# Patient Record
Sex: MALE | Race: BLACK OR AFRICAN AMERICAN | Employment: UNEMPLOYED | URBAN - METROPOLITAN AREA
[De-identification: names, ages, dates, MRNs, and addresses within clinical notes are randomized per-mention and may not be internally consistent; named-entity substitution may affect disease eponyms.]

---

## 2018-12-13 ENCOUNTER — OFFICE VISIT (OUTPATIENT)
Dept: INTERNAL MEDICINE CLINIC | Facility: CLINIC | Age: 38
End: 2018-12-13
Payer: COMMERCIAL

## 2018-12-13 VITALS
BODY MASS INDEX: 33.69 KG/M2 | HEART RATE: 82 BPM | WEIGHT: 276.63 LBS | HEIGHT: 76 IN | RESPIRATION RATE: 16 BRPM | DIASTOLIC BLOOD PRESSURE: 98 MMHG | SYSTOLIC BLOOD PRESSURE: 172 MMHG | TEMPERATURE: 98 F

## 2018-12-13 DIAGNOSIS — I10 ESSENTIAL HYPERTENSION: Primary | ICD-10-CM

## 2018-12-13 DIAGNOSIS — Z00.00 LABORATORY EXAMINATION ORDERED AS PART OF A COMPLETE PHYSICAL EXAMINATION: ICD-10-CM

## 2018-12-13 DIAGNOSIS — G47.33 OSA (OBSTRUCTIVE SLEEP APNEA): ICD-10-CM

## 2018-12-13 DIAGNOSIS — J45.20 MILD INTERMITTENT ASTHMA WITHOUT COMPLICATION: ICD-10-CM

## 2018-12-13 PROCEDURE — 99204 OFFICE O/P NEW MOD 45 MIN: CPT | Performed by: NURSE PRACTITIONER

## 2018-12-13 RX ORDER — MINOXIDIL 2.5 MG/1
2.5 TABLET ORAL DAILY
Refills: 0 | COMMUNITY
Start: 2018-12-13 | End: 2018-12-13

## 2018-12-13 RX ORDER — TRIAMTERENE AND HYDROCHLOROTHIAZIDE 37.5; 25 MG/1; MG/1
1 CAPSULE ORAL EVERY MORNING
Refills: 0 | COMMUNITY
Start: 2018-12-13 | End: 2018-12-13

## 2018-12-13 RX ORDER — IRBESARTAN AND HYDROCHLOROTHIAZIDE 300; 12.5 MG/1; MG/1
1 TABLET, FILM COATED ORAL DAILY
Qty: 90 TABLET | Refills: 0 | Status: SHIPPED | OUTPATIENT
Start: 2018-12-13 | End: 2019-03-21

## 2018-12-13 RX ORDER — NEBIVOLOL 20 MG/1
20 TABLET ORAL DAILY
COMMUNITY
Start: 2018-12-13 | End: 2019-01-14

## 2018-12-13 NOTE — PROGRESS NOTES
Roxy Barbosa is a 34year old male who presents as a new patient to establish. HPI:   Pt complains of .   KERRI  [facility]  No f/u for new machine    Would like to see pulmonologist.  He will obtain copy of sleep st Comment: cage 12/13/18    Drug use: No     Social History: Occ: . : no. Children: yes .   Exercise: minimal.  Diet: watches fats closely and watches sugar closely  Smoker:  no    Cessation Advised:    Alcohol Use:  yes      Concerns: complete physical examination    Orders Placed This Encounter      CBC With Diff      CMP      Lipid      TSH W Reflex To Free T4      UA/M With Culture Reflex [E]      Meds & Refills for this Visit:  Requested Prescriptions     Signed Prescriptions Disp R

## 2018-12-17 ENCOUNTER — TELEPHONE (OUTPATIENT)
Dept: INTERNAL MEDICINE CLINIC | Facility: CLINIC | Age: 38
End: 2018-12-17

## 2018-12-17 NOTE — TELEPHONE ENCOUNTER
Left message on patients phone we need to know what insurance he has. It states it is commercial but patient didn't have a card with him at the appointment. Patient needs info in his chart because of referrals.

## 2018-12-24 ENCOUNTER — TELEPHONE (OUTPATIENT)
Dept: INTERNAL MEDICINE CLINIC | Facility: CLINIC | Age: 38
End: 2018-12-24

## 2018-12-24 NOTE — TELEPHONE ENCOUNTER
Nela Watt [facility] Clinical Staff             Good Morning   Referral closed until we have insurance.         Note    ----- Message -----  From: Edvin Gallegos  Sent: 12/20/2018  11:51 AM  To: Ramona Nayak RN  Subject: RE: Insurance  please obtain patient's current insurance.

## 2019-01-08 ENCOUNTER — TELEPHONE (OUTPATIENT)
Dept: INTERNAL MEDICINE CLINIC | Facility: CLINIC | Age: 39
End: 2019-01-08

## 2019-01-08 NOTE — TELEPHONE ENCOUNTER
Has Questions about BYTOLIC script. Was told by SD to cut the script in half. She is not who prescribed it. Pt is out of script and has been for 5-7  days. Should he be taking it?

## 2019-01-14 ENCOUNTER — OFFICE VISIT (OUTPATIENT)
Dept: INTERNAL MEDICINE CLINIC | Facility: CLINIC | Age: 39
End: 2019-01-14
Payer: COMMERCIAL

## 2019-01-14 VITALS
RESPIRATION RATE: 16 BRPM | BODY MASS INDEX: 34.1 KG/M2 | HEART RATE: 82 BPM | SYSTOLIC BLOOD PRESSURE: 166 MMHG | HEIGHT: 76 IN | DIASTOLIC BLOOD PRESSURE: 120 MMHG | WEIGHT: 280 LBS

## 2019-01-14 DIAGNOSIS — I10 ESSENTIAL HYPERTENSION: Primary | ICD-10-CM

## 2019-01-14 DIAGNOSIS — G47.33 OSA (OBSTRUCTIVE SLEEP APNEA): ICD-10-CM

## 2019-01-14 PROCEDURE — 99213 OFFICE O/P EST LOW 20 MIN: CPT | Performed by: NURSE PRACTITIONER

## 2019-01-14 RX ORDER — AMLODIPINE BESYLATE 5 MG/1
5 TABLET ORAL DAILY
Qty: 90 TABLET | Refills: 1 | Status: SHIPPED | OUTPATIENT
Start: 2019-01-14 | End: 2019-07-19

## 2019-01-14 NOTE — TELEPHONE ENCOUNTER
#2 attempt, LM on VM at [phone] to call back, patient was supposed to send us BP readings after LOV.

## 2019-01-14 NOTE — PROGRESS NOTES
Roxy Barbosa is a 34year old male.   Patient presents with:  Blood Pressure: TN Exam Room 10: Blood Pressure follow up has not had a chance to send in BP readings as requested      HPI:   Here for bp check   Did not send readings once we adjusted his me 1.8 oz      Types: 3 Standard drinks or equivalent per week      Frequency: 2-4 times a month      Drinks per session: 3 or 4    Drug use: No    No family history on file.      Allergies  No Known Allergies    REVIEW OF SYSTEMS:   GENERAL HEALTH: feels well

## 2019-01-21 NOTE — TELEPHONE ENCOUNTER
Spoke with patient states his BP readings have been 150-160/, pt. Aware rx for AmLODIPine Besylate 5 MG sent to pharmacy, patient is currently out of town comes back tonight will f/u with SD once he is back.  Patient denies headaches, blurred vision,

## 2019-01-21 NOTE — TELEPHONE ENCOUNTER
Start amlodipine as prescribed at [facility] 1/14  email or call with readings next week. No bystolic as his insurance will not cover this medication and it will be very expensive.

## 2019-01-21 NOTE — TELEPHONE ENCOUNTER
Spoke with patient per SD start amlodipine as prescribed on 1/14/19 email or call with readings next week. No bystolic as his insurance will not cover this medication and it will be very expensive. Patient verbalized understanding and agreeable to POC.

## 2019-03-21 ENCOUNTER — TELEPHONE (OUTPATIENT)
Dept: INTERNAL MEDICINE CLINIC | Facility: CLINIC | Age: 39
End: 2019-03-21

## 2019-03-21 RX ORDER — IRBESARTAN AND HYDROCHLOROTHIAZIDE 300; 12.5 MG/1; MG/1
1 TABLET, FILM COATED ORAL DAILY
Qty: 90 TABLET | Refills: 0 | Status: SHIPPED | OUTPATIENT
Start: 2019-03-21 | End: 2019-06-20

## 2019-03-21 NOTE — TELEPHONE ENCOUNTER
Due for ov and labs. Has not completed labs as ordered at [facility] in December.

## 2019-03-21 NOTE — TELEPHONE ENCOUNTER
Last Office Visit: 1-14-19 with SD for blood pressure  Last Rx Filled: 12-13-18 90 tabs with no refills   Last Labs: 8-17-15 cbc/cmp/ua  Future Appointment: none    Per protocol to provider

## 2019-05-23 NOTE — TELEPHONE ENCOUNTER
Future Appointments   Date Time Provider Krystal Rasheed   6/6/2019 11:15 AM WHITNEY Cast [facility]     Please fill meds to hold him over until this appt

## 2019-05-23 NOTE — TELEPHONE ENCOUNTER
Patient has an appointment 6/6/19 and would like medication to get him until appointment time per previous TE

## 2019-06-20 RX ORDER — IRBESARTAN AND HYDROCHLOROTHIAZIDE 300; 12.5 MG/1; MG/1
1 TABLET, FILM COATED ORAL DAILY
Qty: 30 TABLET | Refills: 0 | Status: SHIPPED | OUTPATIENT
Start: 2019-06-20 | End: 2019-07-19

## 2019-06-20 NOTE — TELEPHONE ENCOUNTER
Future Appointments   Date Time Provider Krystal Rasheed   7/2/2019  9:20 AM WHITNEY Dominguez [facility]

## 2019-06-20 NOTE — TELEPHONE ENCOUNTER
Last Ov: 1/14/19, SD, BP  Upcoming appt: 7/2/19  Last labs: UA, CBC, CMP 8/17/15 from Dr. Michael Maldonado  Last Rx: irbesartan-hctz 300/12.5mg #90, 0 R 3/21/19    Per Protocol, fail. Pt due for labs. Rx pending, please sign if appropriate.      Left detailed message

## 2019-07-19 RX ORDER — AMLODIPINE BESYLATE 5 MG/1
TABLET ORAL
Qty: 90 TABLET | Refills: 0 | Status: SHIPPED | OUTPATIENT
Start: 2019-07-19 | End: 2019-08-23

## 2019-07-19 RX ORDER — IRBESARTAN AND HYDROCHLOROTHIAZIDE 300; 12.5 MG/1; MG/1
1 TABLET, FILM COATED ORAL DAILY
Qty: 30 TABLET | Refills: 0 | Status: SHIPPED | OUTPATIENT
Start: 2019-07-19 | End: 2019-08-23

## 2019-07-19 NOTE — TELEPHONE ENCOUNTER
Overdue for labs and ov. Needs to complete labs as discussed multiple times previously  Last labs 2015.   Next refill will be limited to 10 day supply

## 2019-07-19 NOTE — TELEPHONE ENCOUNTER
Last Ov: 1/14/19, SD, BP F/U  Upcoming appt: no upcoming natasha  Last labs: UA w Cx, CBC, CMP 8/17/15 with Dr. Suzanne Mooney  Last Rx: Christopher Lester 300/12.5mg, #30, 0R 6/20/19    Per Protocol, failed. Pt due for appt and CMP/BMP.  Rx pending, please sign if appropr

## 2019-07-19 NOTE — TELEPHONE ENCOUNTER
Last Ov: 1/14/19, SD, BP F/U  Upcoming appt: no upcoming appt  Last labs: UA w Cx, CBC, CMP 8/17/15 by Dr. Naren Franz  Last Rx: amlodipine 5mg, #90, 1R 1/14/19    Per Protocol, failed. Pt due for CMP/BMP and no appt in last 6mon or upcoming 3mon.  Rx pending, pl

## 2019-08-23 RX ORDER — IRBESARTAN AND HYDROCHLOROTHIAZIDE 300; 12.5 MG/1; MG/1
1 TABLET, FILM COATED ORAL DAILY
Qty: 10 TABLET | Refills: 0 | Status: SHIPPED | OUTPATIENT
Start: 2019-08-23 | End: 2019-10-04

## 2019-08-23 RX ORDER — AMLODIPINE BESYLATE 5 MG/1
TABLET ORAL
Qty: 10 TABLET | Refills: 0 | Status: SHIPPED | OUTPATIENT
Start: 2019-08-23 | End: 2019-10-04

## 2019-08-23 NOTE — TELEPHONE ENCOUNTER
Last Ov: 1/14/19, SD, BP F/U  Upcoming appt: no upcoming appt  Last labs: UA, CBC, CMP 8/17/15 by Dr. Ale Dutton  Last Rx:   Amlodipine 5mg, #90, 0R 7/19/19  Irbesartan-HCTZ 300/12.5mg, #30, 0R 7/19/19    Per Protocol, failed. Pt due for labs and appointment.  P

## 2019-10-04 RX ORDER — IRBESARTAN AND HYDROCHLOROTHIAZIDE 300; 12.5 MG/1; MG/1
1 TABLET, FILM COATED ORAL DAILY
Qty: 15 TABLET | Refills: 0 | Status: SHIPPED | OUTPATIENT
Start: 2019-10-04 | End: 2019-10-16

## 2019-10-04 RX ORDER — AMLODIPINE BESYLATE 5 MG/1
5 TABLET ORAL
Qty: 15 TABLET | Refills: 0 | Status: SHIPPED | OUTPATIENT
Start: 2019-10-04 | End: 2019-10-16

## 2019-10-04 NOTE — TELEPHONE ENCOUNTER
Future Appointments   Date Time Provider Krystal Rasheed   10/16/2019  7:20 AM WHITNEY Oleary [facility]     Pt is out of his scripts. Has new insurance, which is why he had to wait.  Pt is out

## 2019-10-08 NOTE — TELEPHONE ENCOUNTER
Pt has appt 10/16 with SD. Called pt to see if he wanted to come in sooner. LMTCB. PSR: offer earlier appt and if unable to schedule appt this week, send back to triage. Thank you.

## 2019-10-16 ENCOUNTER — OFFICE VISIT (OUTPATIENT)
Dept: INTERNAL MEDICINE CLINIC | Facility: CLINIC | Age: 39
End: 2019-10-16

## 2019-10-16 ENCOUNTER — LAB ENCOUNTER (OUTPATIENT)
Dept: LAB | Age: 39
End: 2019-10-16
Attending: NURSE PRACTITIONER
Payer: COMMERCIAL

## 2019-10-16 VITALS
WEIGHT: 273 LBS | OXYGEN SATURATION: 95 % | HEIGHT: 76 IN | DIASTOLIC BLOOD PRESSURE: 88 MMHG | BODY MASS INDEX: 33.24 KG/M2 | TEMPERATURE: 98 F | HEART RATE: 94 BPM | SYSTOLIC BLOOD PRESSURE: 138 MMHG | RESPIRATION RATE: 18 BRPM

## 2019-10-16 DIAGNOSIS — I10 ESSENTIAL HYPERTENSION: ICD-10-CM

## 2019-10-16 DIAGNOSIS — Z13.0 SCREENING FOR BLOOD DISEASE: ICD-10-CM

## 2019-10-16 DIAGNOSIS — Z00.00 ROUTINE GENERAL MEDICAL EXAMINATION AT A HEALTH CARE FACILITY: Primary | ICD-10-CM

## 2019-10-16 DIAGNOSIS — Z13.29 SCREENING FOR THYROID DISORDER: ICD-10-CM

## 2019-10-16 DIAGNOSIS — Z13.220 SCREENING, LIPID: ICD-10-CM

## 2019-10-16 DIAGNOSIS — Z13.1 SCREENING FOR DIABETES MELLITUS: ICD-10-CM

## 2019-10-16 DIAGNOSIS — N28.9 RENAL INSUFFICIENCY: ICD-10-CM

## 2019-10-16 DIAGNOSIS — G47.33 OSA (OBSTRUCTIVE SLEEP APNEA): ICD-10-CM

## 2019-10-16 DIAGNOSIS — J45.20 MILD INTERMITTENT ASTHMA WITHOUT COMPLICATION: ICD-10-CM

## 2019-10-16 DIAGNOSIS — E87.6 HYPOKALEMIA: ICD-10-CM

## 2019-10-16 PROCEDURE — 80053 COMPREHEN METABOLIC PANEL: CPT

## 2019-10-16 PROCEDURE — 81001 URINALYSIS AUTO W/SCOPE: CPT

## 2019-10-16 PROCEDURE — 85025 COMPLETE CBC W/AUTO DIFF WBC: CPT

## 2019-10-16 PROCEDURE — 99396 PREV VISIT EST AGE 40-64: CPT | Performed by: NURSE PRACTITIONER

## 2019-10-16 PROCEDURE — 84443 ASSAY THYROID STIM HORMONE: CPT

## 2019-10-16 PROCEDURE — 80061 LIPID PANEL: CPT

## 2019-10-16 RX ORDER — IRBESARTAN AND HYDROCHLOROTHIAZIDE 300; 12.5 MG/1; MG/1
1 TABLET, FILM COATED ORAL DAILY
Qty: 90 TABLET | Refills: 1 | Status: SHIPPED | OUTPATIENT
Start: 2019-10-16 | End: 2020-04-27

## 2019-10-16 RX ORDER — ALBUTEROL SULFATE 90 UG/1
2 AEROSOL, METERED RESPIRATORY (INHALATION) EVERY 6 HOURS PRN
Qty: 1 INHALER | Refills: 3 | Status: SHIPPED | OUTPATIENT
Start: 2019-10-16 | End: 2021-01-05

## 2019-10-16 RX ORDER — AMLODIPINE BESYLATE 5 MG/1
5 TABLET ORAL
Qty: 90 TABLET | Refills: 1 | Status: SHIPPED | OUTPATIENT
Start: 2019-10-16 | End: 2019-11-01

## 2019-10-16 NOTE — PROGRESS NOTES
Gerardo Sewell is a 37year old male who presents for a complete physical exam.     HPI:   Pt complains of   Overdue for labs and follow up   Discussed with patient need for labs and closer follow up for his bp.   I discussed I only gave him 15 tabs as to equivalent per week      Frequency: 2-4 times a month      Drinks per session: 3 or 4    Drug use: No     Social History: Occ: working . : . Children: . Exercise:  twice per week.   Diet: watches fats closely and watches sugar closely  Smoker:  No kg)   SpO2 95%   BMI 33.23 kg/m²   Body mass index is 33.23 kg/m².    GENERAL: well developed, well nourished,in no apparent distress  SKIN: no rashes,no suspicious lesions  HEENT: atraumatic, normocephalic,ears and throat are clear  EYES:PERRLA, EOMI, conj

## 2019-11-01 ENCOUNTER — OFFICE VISIT (OUTPATIENT)
Dept: INTERNAL MEDICINE CLINIC | Facility: CLINIC | Age: 39
End: 2019-11-01

## 2019-11-01 VITALS
HEIGHT: 76 IN | SYSTOLIC BLOOD PRESSURE: 138 MMHG | WEIGHT: 268 LBS | HEART RATE: 80 BPM | TEMPERATURE: 99 F | DIASTOLIC BLOOD PRESSURE: 88 MMHG | BODY MASS INDEX: 32.63 KG/M2

## 2019-11-01 DIAGNOSIS — I10 ESSENTIAL HYPERTENSION: Primary | ICD-10-CM

## 2019-11-01 DIAGNOSIS — H93.8X3 CONGESTION OF BOTH EARS: ICD-10-CM

## 2019-11-01 DIAGNOSIS — M79.644 PAIN OF FINGER OF RIGHT HAND: ICD-10-CM

## 2019-11-01 DIAGNOSIS — H91.93 BILATERAL HEARING LOSS, UNSPECIFIED HEARING LOSS TYPE: ICD-10-CM

## 2019-11-01 PROCEDURE — 99214 OFFICE O/P EST MOD 30 MIN: CPT | Performed by: NURSE PRACTITIONER

## 2019-11-01 RX ORDER — AMLODIPINE BESYLATE 5 MG/1
7.5 TABLET ORAL
Qty: 135 TABLET | Refills: 1 | Status: SHIPPED | OUTPATIENT
Start: 2019-11-01 | End: 2020-05-11

## 2019-11-01 NOTE — PROGRESS NOTES
[facility] is a 37year old male. Patient presents with:  Blood Pressure: Room 11. HPI:   Here for recheck of his bp   He was here for CPE a few weeks ago. His bp was elevated and was only taking 1/2 tabs of pills as he was running low.    Aviva Warner No    No family history on file.      Allergies  No Known Allergies    REVIEW OF SYSTEMS:   GENERAL HEALTH: feels well otherwise  As above  RESPIRATORY: denies shortness of breath with exertion, no cough  CARDIOVASCULAR: denies chest pain on exertion, no pa

## 2020-02-20 ENCOUNTER — OFFICE VISIT (OUTPATIENT)
Dept: INTERNAL MEDICINE CLINIC | Facility: CLINIC | Age: 40
End: 2020-02-20

## 2020-02-20 ENCOUNTER — LAB ENCOUNTER (OUTPATIENT)
Dept: LAB | Age: 40
End: 2020-02-20
Attending: NURSE PRACTITIONER
Payer: COMMERCIAL

## 2020-02-20 VITALS
TEMPERATURE: 97 F | RESPIRATION RATE: 16 BRPM | OXYGEN SATURATION: 98 % | WEIGHT: 260 LBS | HEIGHT: 76 IN | SYSTOLIC BLOOD PRESSURE: 122 MMHG | DIASTOLIC BLOOD PRESSURE: 74 MMHG | HEART RATE: 87 BPM | BODY MASS INDEX: 31.66 KG/M2

## 2020-02-20 DIAGNOSIS — Z11.3 SCREEN FOR STD (SEXUALLY TRANSMITTED DISEASE): ICD-10-CM

## 2020-02-20 DIAGNOSIS — M10.9 ACUTE GOUT OF LEFT FOOT, UNSPECIFIED CAUSE: ICD-10-CM

## 2020-02-20 DIAGNOSIS — I10 ESSENTIAL HYPERTENSION: Primary | ICD-10-CM

## 2020-02-20 DIAGNOSIS — Z11.3 SCREEN FOR STD (SEXUALLY TRANSMITTED DISEASE): Primary | ICD-10-CM

## 2020-02-20 DIAGNOSIS — R31.9 HEMATURIA, UNSPECIFIED TYPE: ICD-10-CM

## 2020-02-20 LAB
BILIRUB UR QL STRIP.AUTO: NEGATIVE
CLARITY UR REFRACT.AUTO: CLEAR
COLOR UR AUTO: YELLOW
GLUCOSE UR STRIP.AUTO-MCNC: NEGATIVE MG/DL
HBV SURFACE AB SER QL: REACTIVE
HBV SURFACE AB SERPL IA-ACNC: 25.52 MIU/ML
HBV SURFACE AG SER-ACNC: <0.1 [IU]/L
HBV SURFACE AG SERPL QL IA: NONREACTIVE
KETONES UR STRIP.AUTO-MCNC: NEGATIVE MG/DL
LEUKOCYTE ESTERASE UR QL STRIP.AUTO: NEGATIVE
NITRITE UR QL STRIP.AUTO: NEGATIVE
PH UR STRIP.AUTO: 6 [PH] (ref 4.5–8)
PROT UR STRIP.AUTO-MCNC: NEGATIVE MG/DL
SP GR UR STRIP.AUTO: 1.01 (ref 1–1.03)
T PALLIDUM AB SER QL IA: NONREACTIVE
UROBILINOGEN UR STRIP.AUTO-MCNC: <2 MG/DL

## 2020-02-20 PROCEDURE — 87491 CHLMYD TRACH DNA AMP PROBE: CPT

## 2020-02-20 PROCEDURE — 87591 N.GONORRHOEAE DNA AMP PROB: CPT

## 2020-02-20 PROCEDURE — 86706 HEP B SURFACE ANTIBODY: CPT

## 2020-02-20 PROCEDURE — 87389 HIV-1 AG W/HIV-1&-2 AB AG IA: CPT

## 2020-02-20 PROCEDURE — 87522 HEPATITIS C REVRS TRNSCRPJ: CPT

## 2020-02-20 PROCEDURE — 87340 HEPATITIS B SURFACE AG IA: CPT

## 2020-02-20 PROCEDURE — 99214 OFFICE O/P EST MOD 30 MIN: CPT | Performed by: NURSE PRACTITIONER

## 2020-02-20 PROCEDURE — 81001 URINALYSIS AUTO W/SCOPE: CPT

## 2020-02-20 PROCEDURE — 86780 TREPONEMA PALLIDUM: CPT

## 2020-02-20 PROCEDURE — 86803 HEPATITIS C AB TEST: CPT

## 2020-02-20 RX ORDER — INDOMETHACIN 50 MG/1
50 CAPSULE ORAL 3 TIMES DAILY PRN
Qty: 60 CAPSULE | Refills: 0 | Status: SHIPPED | OUTPATIENT
Start: 2020-02-20 | End: 2020-07-24

## 2020-02-20 NOTE — PROGRESS NOTES
Leanne Nguyen is a 37year old male. Patient presents with: Follow - Up: wants to get general check up      HPI:   Here for bp follow up   Last ov Nov 2019   His bp has been not well controlled. Irbesartan /12.5mg with amlodipine 7.5mg.   Home b History    Tobacco Use      Smoking status: Former Smoker        Types: Cigarettes        Quit date: 2011        Years since quittin.1      Smokeless tobacco: Never Used    Alcohol use:  Yes      Alcohol/week: 3.0 standard drinks      Types: 3 Sta Chlamydia/Gonococcus by PCR ([facility])      Meds & Refills for this Visit:  Requested Prescriptions     Signed Prescriptions Disp Refills   • indomethacin 50 MG Oral Cap 60 capsule 0     Sig: Take 1 capsule (50 mg total) by mouth 3 (three) times daily as needed

## 2020-02-21 DIAGNOSIS — I10 ESSENTIAL HYPERTENSION: Primary | ICD-10-CM

## 2020-02-21 LAB
C TRACH DNA SPEC QL NAA+PROBE: NEGATIVE
N GONORRHOEA DNA SPEC QL NAA+PROBE: NEGATIVE

## 2020-02-27 LAB — HCV RNA SERPL QL NAA+PROBE: NOT DETECTED

## 2020-03-02 DIAGNOSIS — R79.9 ABNORMAL BLOOD CHEMISTRY TEST: Primary | ICD-10-CM

## 2020-03-02 DIAGNOSIS — R31.29 MICROSCOPIC HEMATURIA: Primary | ICD-10-CM

## 2020-03-02 DIAGNOSIS — R76.8 ABNORMAL HEPATITIS SEROLOGY: ICD-10-CM

## 2020-03-23 ENCOUNTER — TELEPHONE (OUTPATIENT)
Dept: INTERNAL MEDICINE CLINIC | Facility: CLINIC | Age: 40
End: 2020-03-23

## 2020-03-23 DIAGNOSIS — Z20.828 VIRAL DISEASE EXPOSURE: Primary | ICD-10-CM

## 2020-03-23 PROCEDURE — 99441 PHONE E/M BY PHYS 5-10 MIN: CPT | Performed by: NURSE PRACTITIONER

## 2020-03-23 NOTE — TELEPHONE ENCOUNTER
Virtual/Telephone Check-In    Carin Vidal verbally consents to a Virtual/Telephone Check-In service on 03/23/20. Patient understands and accepts financial responsibility for any deductible, co-insurance and/or co-pays associated with this service.

## 2020-03-23 NOTE — TELEPHONE ENCOUNTER
He helped a friend move and she is being tested for the coronavirus. She is being tested. The physician told his friend she does have all symptoms that she does have the virus. He stayed home from work. He is not having any symptoms. He would like to know where he can go get tested. Please advise. He does have to go to work.  Not traveled

## 2020-04-27 RX ORDER — IRBESARTAN AND HYDROCHLOROTHIAZIDE 300; 12.5 MG/1; MG/1
1 TABLET, FILM COATED ORAL DAILY
Qty: 90 TABLET | Refills: 1 | Status: SHIPPED | OUTPATIENT
Start: 2020-04-27 | End: 2021-01-05

## 2020-04-27 NOTE — TELEPHONE ENCOUNTER
Last VISIT 2/20/20 SD HTN    Last REFILL 10/16/19 Irbesartan HCL 90T 1R    Last LABS 10/16/19 CBC, CMP, Lipid, TSH, UA    No future appointments. Per PROTOCOL? HTN Protocol passed, Rx sent    Please Approve or Deny.

## 2020-05-11 RX ORDER — AMLODIPINE BESYLATE 5 MG/1
TABLET ORAL
Qty: 90 TABLET | Refills: 0 | Status: SHIPPED | OUTPATIENT
Start: 2020-05-11 | End: 2021-02-22

## 2020-07-16 ENCOUNTER — PATIENT MESSAGE (OUTPATIENT)
Dept: INTERNAL MEDICINE CLINIC | Facility: CLINIC | Age: 40
End: 2020-07-16

## 2020-07-16 NOTE — TELEPHONE ENCOUNTER
If those were negative, not sure what other labs he would like. Needs to be seen (as scheduled) to eval his c/o  Further labs and testing at that time. Is there something specific he is looking for? Should be retested for chlamydia/gonorrhea 2 weeks post exposure. Is that before ov?

## 2020-07-16 NOTE — TELEPHONE ENCOUNTER
From: Charlette Narayan  To: Kaylan TeriWHITNEY fuentes  Sent: 7/16/2020 8:25 AM CDT  Subject: Test Results Question    Ye Davis,    I have a question regarding some test results I received from another clinic. I had recently had an encounter with an acquaintance afew days ago. I had a test done yesterday and it came back negative. I have experienced some slight pain in my testicles and what can be described as bloating or abdominal discomfort but again the result came back negative. No pain in urination no discharge no other signs or symptoms. Can I possibly have labs ordered before my next scheduled visit. What should I do?      Thanks,  Charlette Narayan

## 2020-07-24 ENCOUNTER — OFFICE VISIT (OUTPATIENT)
Dept: INTERNAL MEDICINE CLINIC | Facility: CLINIC | Age: 40
End: 2020-07-24

## 2020-07-24 VITALS
DIASTOLIC BLOOD PRESSURE: 76 MMHG | HEART RATE: 60 BPM | TEMPERATURE: 98 F | HEIGHT: 76 IN | SYSTOLIC BLOOD PRESSURE: 124 MMHG | WEIGHT: 261 LBS | BODY MASS INDEX: 31.78 KG/M2

## 2020-07-24 DIAGNOSIS — R14.0 ABDOMINAL BLOATING: Primary | ICD-10-CM

## 2020-07-24 DIAGNOSIS — Z11.3 SCREEN FOR STD (SEXUALLY TRANSMITTED DISEASE): ICD-10-CM

## 2020-07-24 DIAGNOSIS — I10 ESSENTIAL HYPERTENSION: ICD-10-CM

## 2020-07-24 PROCEDURE — 3078F DIAST BP <80 MM HG: CPT | Performed by: NURSE PRACTITIONER

## 2020-07-24 PROCEDURE — 3074F SYST BP LT 130 MM HG: CPT | Performed by: NURSE PRACTITIONER

## 2020-07-24 PROCEDURE — 3008F BODY MASS INDEX DOCD: CPT | Performed by: NURSE PRACTITIONER

## 2020-07-24 PROCEDURE — 87491 CHLMYD TRACH DNA AMP PROBE: CPT | Performed by: NURSE PRACTITIONER

## 2020-07-24 PROCEDURE — 99214 OFFICE O/P EST MOD 30 MIN: CPT | Performed by: NURSE PRACTITIONER

## 2020-07-24 PROCEDURE — 87591 N.GONORRHOEAE DNA AMP PROB: CPT | Performed by: NURSE PRACTITIONER

## 2020-07-24 RX ORDER — FAMOTIDINE 20 MG/1
20 TABLET ORAL 2 TIMES DAILY
Qty: 180 TABLET | Refills: 0 | Status: SHIPPED | OUTPATIENT
Start: 2020-07-24 | End: 2021-06-07

## 2020-07-24 NOTE — PROGRESS NOTES
Criss Garcia is a 41year old male. Patient presents with: Other: AJ rm 10 stomach bloating on left side x 1 week      HPI:   Presents for eval for STD testing and abdominal bloating. Encounter with friend and received oral sex. No penetration.   Ur Allergies  No Known Allergies    REVIEW OF SYSTEMS:   GENERAL HEALTH: not feeling well.     RESPIRATORY: denies shortness of breath with exertion, no cough  CARDIOVASCULAR: denies chest pain on exertion, no palpatations  GI: as above     As above  MUS

## 2020-07-27 LAB
C TRACH DNA SPEC QL NAA+PROBE: NEGATIVE
N GONORRHOEA DNA SPEC QL NAA+PROBE: NEGATIVE

## 2020-08-05 ENCOUNTER — TELEPHONE (OUTPATIENT)
Dept: INTERNAL MEDICINE CLINIC | Facility: CLINIC | Age: 40
End: 2020-08-05

## 2020-08-05 NOTE — TELEPHONE ENCOUNTER
Pt stated he has questions on the some of the testing that he had done in February and would like to talk to someone on them. Please advise.

## 2020-08-05 NOTE — TELEPHONE ENCOUNTER
Spoke with patient stating he was tested for STDs in February 2020 everything came back negative, patient indicates had sexual intercourse with friend, states friend notified him she tested positive for HSV but has to wait 4 more days for results to [facility]

## 2020-08-05 NOTE — TELEPHONE ENCOUNTER
Spoke with patient notified per SD, the only test for HSV is blood, this will show if he has ever been exposed and developed antibodies to HSV 1 or 2 as it is an IGG level, it is not appropriate in this setting, patient advised to monitor for symptoms, dis

## 2020-08-05 NOTE — TELEPHONE ENCOUNTER
The only test for HSV is blood. This will show positive if he has ever been exposed and developed antibodies to HSV 1 or 2 as it is an IGG level. It is not appropriate in this setting. He will need to monitor for symptoms.    We have discussed in the pa

## 2021-01-05 RX ORDER — ALBUTEROL SULFATE 90 UG/1
AEROSOL, METERED RESPIRATORY (INHALATION)
Qty: 8.5 G | Refills: 0 | Status: SHIPPED | OUTPATIENT
Start: 2021-01-05

## 2021-01-05 RX ORDER — IRBESARTAN AND HYDROCHLOROTHIAZIDE 300; 12.5 MG/1; MG/1
1 TABLET, FILM COATED ORAL DAILY
Qty: 90 TABLET | Refills: 0 | Status: SHIPPED | OUTPATIENT
Start: 2021-01-05 | End: 2021-05-04

## 2021-01-05 NOTE — TELEPHONE ENCOUNTER
Last VISIT 07/24/20    Last REFILL 10/16/19 qty 1 w/3 refills    Last LABS 07/24/20 G/C culture done     No future appointments. Per PROTOCOL? Failed    Please Approve or Deny.

## 2021-01-05 NOTE — TELEPHONE ENCOUNTER
Last VISIT 07/24/20    Last REFILL 04/27/20 qty 90 w/1 refill    Last LABS 07/24/20 G/C culture    No future appointments. Per PROTOCOL? Failed    Please Approve or Deny.

## 2021-01-07 NOTE — TELEPHONE ENCOUNTER
timmy w/pt and will call back to sched lab/appt-has new job and ins does not start for another month

## 2021-02-22 RX ORDER — AMLODIPINE BESYLATE 5 MG/1
TABLET ORAL
Qty: 135 TABLET | Refills: 0 | Status: SHIPPED | OUTPATIENT
Start: 2021-02-22 | End: 2021-06-07

## 2021-02-22 NOTE — TELEPHONE ENCOUNTER
Last OV: 7/24/20 acute f/u    No future appointments.      Latest labs: 10/16/19 TSH, Lipid, CMP and CBC    Latest RX: amlodipine- 90 tabs 0 refills on 5/11/20    Per protocol, failed due to CMP or BMP in past 12 months and Appointment in past 6 or next 3 m

## 2021-02-26 NOTE — TELEPHONE ENCOUNTER
Future Appointments   Date Time Provider Krystal Rasheed   3/4/2021  7:45 AM REFERENCE [id number]   3/8/2021  4:00 PM WHITNEY Berman [facility]

## 2021-05-01 DIAGNOSIS — Z13.220 SCREENING, LIPID: ICD-10-CM

## 2021-05-01 DIAGNOSIS — Z13.0 SCREENING FOR BLOOD DISEASE: Primary | ICD-10-CM

## 2021-05-01 DIAGNOSIS — Z13.1 SCREENING FOR DIABETES MELLITUS: ICD-10-CM

## 2021-05-01 DIAGNOSIS — Z13.29 SCREENING FOR THYROID DISORDER: ICD-10-CM

## 2021-05-01 DIAGNOSIS — I10 ESSENTIAL HYPERTENSION: ICD-10-CM

## 2021-05-04 RX ORDER — IRBESARTAN AND HYDROCHLOROTHIAZIDE 300; 12.5 MG/1; MG/1
1 TABLET, FILM COATED ORAL DAILY
Qty: 90 TABLET | Refills: 0 | Status: SHIPPED | OUTPATIENT
Start: 2021-05-04 | End: 2021-06-07

## 2021-05-04 NOTE — TELEPHONE ENCOUNTER
Last OV: 7/24/20 acute f/u    No future appointments.      Latest labs: 10/16/19 TSH, Lipid, CMP and CBC    Latest RX: Irbesartan- 90 tabs 0 refills on 1/5/21    Per protocol, failed due to CMP or BMP in past 12 months and Appointment in past 6 or next 3 mo

## 2021-05-05 NOTE — TELEPHONE ENCOUNTER
CPE   Future Appointments   Date Time Provider Krystal Rasheed   6/21/2021  5:40 PM WHITNEY Richards [facility]

## 2021-05-26 ENCOUNTER — TELEPHONE (OUTPATIENT)
Dept: INTERNAL MEDICINE CLINIC | Facility: CLINIC | Age: 41
End: 2021-05-26

## 2021-05-26 DIAGNOSIS — Z12.5 SCREENING FOR PROSTATE CANCER: Primary | ICD-10-CM

## 2021-05-26 DIAGNOSIS — Z11.3 SCREEN FOR STD (SEXUALLY TRANSMITTED DISEASE): ICD-10-CM

## 2021-05-26 NOTE — TELEPHONE ENCOUNTER
Pt calling to request additional screening labs for STD's prior to his CPE with Gaylan Chimera. Pt is not having any symptoms but may have had an exposure. [facility] will [facility].   Future Appointments   Date Time Provider Krystal Rasheed   6/7/2

## 2021-05-27 ENCOUNTER — LAB ENCOUNTER (OUTPATIENT)
Dept: LAB | Age: 41
End: 2021-05-27
Attending: NURSE PRACTITIONER
Payer: COMMERCIAL

## 2021-05-27 DIAGNOSIS — Z11.3 SCREEN FOR STD (SEXUALLY TRANSMITTED DISEASE): Primary | ICD-10-CM

## 2021-05-27 DIAGNOSIS — Z13.29 SCREENING FOR THYROID DISORDER: ICD-10-CM

## 2021-05-27 DIAGNOSIS — Z13.220 SCREENING, LIPID: ICD-10-CM

## 2021-05-27 DIAGNOSIS — I10 ESSENTIAL HYPERTENSION: ICD-10-CM

## 2021-05-27 DIAGNOSIS — Z13.1 SCREENING FOR DIABETES MELLITUS: ICD-10-CM

## 2021-05-27 DIAGNOSIS — Z12.5 SCREENING FOR PROSTATE CANCER: ICD-10-CM

## 2021-05-27 DIAGNOSIS — Z13.0 SCREENING FOR BLOOD DISEASE: ICD-10-CM

## 2021-05-27 PROCEDURE — 80053 COMPREHEN METABOLIC PANEL: CPT

## 2021-05-27 PROCEDURE — 80061 LIPID PANEL: CPT

## 2021-05-27 PROCEDURE — 87591 N.GONORRHOEAE DNA AMP PROB: CPT

## 2021-05-27 PROCEDURE — 87389 HIV-1 AG W/HIV-1&-2 AB AG IA: CPT

## 2021-05-27 PROCEDURE — 87491 CHLMYD TRACH DNA AMP PROBE: CPT

## 2021-05-27 PROCEDURE — 86780 TREPONEMA PALLIDUM: CPT

## 2021-05-27 PROCEDURE — 81001 URINALYSIS AUTO W/SCOPE: CPT

## 2021-05-27 PROCEDURE — 36415 COLL VENOUS BLD VENIPUNCTURE: CPT

## 2021-05-27 PROCEDURE — 84443 ASSAY THYROID STIM HORMONE: CPT

## 2021-05-27 PROCEDURE — 85025 COMPLETE CBC W/AUTO DIFF WBC: CPT

## 2021-06-07 ENCOUNTER — OFFICE VISIT (OUTPATIENT)
Dept: INTERNAL MEDICINE CLINIC | Facility: CLINIC | Age: 41
End: 2021-06-07
Payer: COMMERCIAL

## 2021-06-07 VITALS
TEMPERATURE: 98 F | SYSTOLIC BLOOD PRESSURE: 136 MMHG | HEART RATE: 82 BPM | BODY MASS INDEX: 32.27 KG/M2 | DIASTOLIC BLOOD PRESSURE: 86 MMHG | HEIGHT: 76 IN | WEIGHT: 265 LBS

## 2021-06-07 DIAGNOSIS — N28.9 RENAL INSUFFICIENCY: ICD-10-CM

## 2021-06-07 DIAGNOSIS — Z00.00 ROUTINE GENERAL MEDICAL EXAMINATION AT A HEALTH CARE FACILITY: Primary | ICD-10-CM

## 2021-06-07 DIAGNOSIS — G47.33 OSA (OBSTRUCTIVE SLEEP APNEA): ICD-10-CM

## 2021-06-07 DIAGNOSIS — I10 ESSENTIAL HYPERTENSION: ICD-10-CM

## 2021-06-07 DIAGNOSIS — D72.819 LEUKOPENIA, UNSPECIFIED TYPE: ICD-10-CM

## 2021-06-07 DIAGNOSIS — Z12.5 SCREENING FOR PROSTATE CANCER: ICD-10-CM

## 2021-06-07 DIAGNOSIS — J45.20 MILD INTERMITTENT ASTHMA WITHOUT COMPLICATION: ICD-10-CM

## 2021-06-07 PROCEDURE — 3075F SYST BP GE 130 - 139MM HG: CPT | Performed by: NURSE PRACTITIONER

## 2021-06-07 PROCEDURE — 3079F DIAST BP 80-89 MM HG: CPT | Performed by: NURSE PRACTITIONER

## 2021-06-07 PROCEDURE — 99396 PREV VISIT EST AGE 40-64: CPT | Performed by: NURSE PRACTITIONER

## 2021-06-07 PROCEDURE — 3008F BODY MASS INDEX DOCD: CPT | Performed by: NURSE PRACTITIONER

## 2021-06-07 RX ORDER — IRBESARTAN AND HYDROCHLOROTHIAZIDE 300; 12.5 MG/1; MG/1
1 TABLET, FILM COATED ORAL DAILY
Qty: 90 TABLET | Refills: 1 | Status: SHIPPED | OUTPATIENT
Start: 2021-06-07 | End: 2022-01-24

## 2021-06-07 RX ORDER — AMLODIPINE BESYLATE 5 MG/1
7.5 TABLET ORAL DAILY
Qty: 135 TABLET | Refills: 1 | Status: SHIPPED | OUTPATIENT
Start: 2021-06-07 | End: 2021-10-28

## 2021-06-07 NOTE — PROGRESS NOTES
Leah Minor is a 41year old male who presents for a complete physical exam.     HPI:   Pt complains of . Leukopenia  Feels this was an issue back when he was seeing a provider at [facility].   He feels he may have had full w/u in the past with whitleyo tablet by mouth daily. 90 tablet 1   • amLODIPine Besylate 5 MG Oral Tab Take 1.5 tablets (7.5 mg total) by mouth daily.  135 tablet 1   • ALBUTEROL SULFATE  (90 Base) MCG/ACT Inhalation Aero Soln INHALE 2 PUFFS INTO THE LUNGS EVERY 6 HOURS AS NEEDED 07/12/1979 09/04/1981 06/17/1988 06/16/1992    Dental Visits:  Yes   Colonoscopy:  Na  No recommendations at this time  PSA:  Will order with next labs.       REVIEW OF SYSTEMS:   GENERAL: feels well otherwise  SKIN: denies any Controlled. Leukopenia, unspecified type  Refer to hematology. Renal insufficiency  Refer to Dr Cesar Mcnulty. Avoid NSAIDs.     Screening for prostate cancer    Orders Placed This Encounter      PSA (Screening) [E]      Comp Metabolic Panel (14) [E]      CBC W

## 2021-06-23 ENCOUNTER — OFFICE VISIT (OUTPATIENT)
Dept: HEMATOLOGY/ONCOLOGY | Facility: HOSPITAL | Age: 41
End: 2021-06-23
Attending: INTERNAL MEDICINE
Payer: COMMERCIAL

## 2021-06-23 VITALS
DIASTOLIC BLOOD PRESSURE: 84 MMHG | RESPIRATION RATE: 18 BRPM | BODY MASS INDEX: 32 KG/M2 | OXYGEN SATURATION: 98 % | SYSTOLIC BLOOD PRESSURE: 134 MMHG | TEMPERATURE: 97 F | HEART RATE: 84 BPM | WEIGHT: 265.5 LBS

## 2021-06-23 DIAGNOSIS — D70.9 CHRONIC NEUTROPENIA (HCC): ICD-10-CM

## 2021-06-23 DIAGNOSIS — D72.810 LYMPHOPENIA: Primary | ICD-10-CM

## 2021-06-23 PROBLEM — D57.3 SICKLE CELL TRAIT (HCC): Status: ACTIVE | Noted: 2017-12-18

## 2021-06-23 PROBLEM — D57.3 SICKLE CELL TRAIT: Status: ACTIVE | Noted: 2017-12-18

## 2021-06-23 PROCEDURE — 99205 OFFICE O/P NEW HI 60 MIN: CPT | Performed by: INTERNAL MEDICINE

## 2021-06-23 NOTE — PROGRESS NOTES
Education Record    Learner:  Patient    Disease / [place]    Barriers / Limitations:  None   Comments:    Method:  Discussion   Comments:    General Topics:  Plan of care reviewed   Comments:    Outcome:  Shows understanding   Comments:consul

## 2021-06-23 NOTE — PROGRESS NOTES
Hematology/Oncology Initial Consultation Note    Patient Name: Hortencia Boo  Medical Record Number: [id number]   YOB: 1980   Date of Consultation: 6/23/2021   PCP: Dr. Isabel Moon    Reason for Consultation:  Hortencia Boo was seen unspecified    • Sickle cell trait ([facility] [address]      Past Surgical History:   Procedure Laterality Date   • HAND/FINGER SURGERY UNLISTED Right    • REPAIR ROTATOR CUFF,ACUTE Right      Home Medications:  Irbesartan-hydroCHLOROthiazide 300-12.5 MG Oral Tab, Take 1 : 123.8 kg (273 lb)    Physical Examination:  General: Patient is alert and oriented, not in acute distress  Psych: Mood and affect are appropriate  Eyes: EOMI  ENT: Oropharynx is clear  CV: Regular rate and rhythm, no murmurs, no LE edema  Respiratory: Jenise low density area which is incompletely characterized on this noncontrast study.     ADRENALS:  Normal.  No mass or enlargement.     LIVER:  Entire liver is not imaged on this type of study.  Visualized portions demonstrate an unremarkable noncontrast CT natasha

## 2021-06-29 ENCOUNTER — OFFICE VISIT (OUTPATIENT)
Dept: NEPHROLOGY | Facility: CLINIC | Age: 41
End: 2021-06-29
Payer: COMMERCIAL

## 2021-06-29 VITALS — HEART RATE: 72 BPM | DIASTOLIC BLOOD PRESSURE: 80 MMHG | SYSTOLIC BLOOD PRESSURE: 110 MMHG

## 2021-06-29 DIAGNOSIS — I10 ESSENTIAL HYPERTENSION: ICD-10-CM

## 2021-06-29 DIAGNOSIS — R80.9 PROTEINURIA, UNSPECIFIED TYPE: ICD-10-CM

## 2021-06-29 DIAGNOSIS — N18.2 CKD (CHRONIC KIDNEY DISEASE) STAGE 2, GFR 60-89 ML/MIN: Primary | ICD-10-CM

## 2021-06-29 PROCEDURE — 3074F SYST BP LT 130 MM HG: CPT | Performed by: INTERNAL MEDICINE

## 2021-06-29 PROCEDURE — 99204 OFFICE O/P NEW MOD 45 MIN: CPT | Performed by: INTERNAL MEDICINE

## 2021-06-29 PROCEDURE — 3079F DIAST BP 80-89 MM HG: CPT | Performed by: INTERNAL MEDICINE

## 2021-06-29 NOTE — PROGRESS NOTES
Nephrology Consult Note    REASON FOR CONSULT: CKD 2    ASSESSMENT/PLAN:      1) CKD 2- serum creatinine has ranged from 1.2 in 2015 to 1.5 mg/dL currently and may reflect either hypertensive nephrosclerosis versus a low-grade primary glomerulopathy such a MD    Very pleasant 53-year-old male presents for evaluation of an elevated serum creatinine of 1.5 mg/dL. Is otherwise been fairly healthy without other major medical issues.   No history of acute or chronic renal failure gross microscopic hematuria prote Smokeless tobacco: Never Used    Vaping Use      Vaping Use: Never used    Substance and Sexual Activity      Alcohol use:  Yes        Alcohol/week: 3.0 standard drinks        Types: 3 Standard drinks or equivalent per week        Comment: socially, no hx

## 2021-07-01 ENCOUNTER — TELEPHONE (OUTPATIENT)
Dept: HEMATOLOGY/ONCOLOGY | Facility: HOSPITAL | Age: 41
End: 2021-07-01

## 2021-07-01 PROBLEM — D72.819 CHRONIC LEUKOPENIA: Status: ACTIVE | Noted: 2021-06-07

## 2021-07-01 NOTE — TELEPHONE ENCOUNTER
Lab work-up for leukopenia was unrevealing. His history of intermittent mild lymphopenia and neutropenia has been chronic for at least the last 20 years without increased infections. This is almost certainly a benign condition, may be hereditary.   I jakub

## 2021-07-03 ENCOUNTER — LAB ENCOUNTER (OUTPATIENT)
Dept: LAB | Facility: HOSPITAL | Age: 41
End: 2021-07-03
Attending: INTERNAL MEDICINE
Payer: COMMERCIAL

## 2021-07-03 DIAGNOSIS — I10 ESSENTIAL HYPERTENSION: ICD-10-CM

## 2021-07-03 DIAGNOSIS — Z12.5 SCREENING FOR PROSTATE CANCER: ICD-10-CM

## 2021-07-03 DIAGNOSIS — R80.9 PROTEINURIA, UNSPECIFIED TYPE: ICD-10-CM

## 2021-07-03 DIAGNOSIS — D72.819 LEUKOPENIA, UNSPECIFIED TYPE: ICD-10-CM

## 2021-07-03 DIAGNOSIS — N28.9 RENAL INSUFFICIENCY: ICD-10-CM

## 2021-07-03 DIAGNOSIS — N18.2 CKD (CHRONIC KIDNEY DISEASE) STAGE 2, GFR 60-89 ML/MIN: ICD-10-CM

## 2021-07-03 LAB
ALBUMIN SERPL-MCNC: 3.4 G/DL (ref 3.4–5)
ALBUMIN/GLOB SERPL: 1 {RATIO} (ref 1–2)
ALP LIVER SERPL-CCNC: 67 U/L
ALT SERPL-CCNC: 30 U/L
ANION GAP SERPL CALC-SCNC: 4 MMOL/L (ref 0–18)
AST SERPL-CCNC: 21 U/L (ref 15–37)
BASOPHILS # BLD AUTO: 0.02 X10(3) UL (ref 0–0.2)
BASOPHILS NFR BLD AUTO: 0.7 %
BILIRUB SERPL-MCNC: 0.6 MG/DL (ref 0.1–2)
BUN BLD-MCNC: 13 MG/DL (ref 7–18)
BUN/CREAT SERPL: 10.4 (ref 10–20)
CALCIUM BLD-MCNC: 8.3 MG/DL (ref 8.5–10.1)
CHLORIDE SERPL-SCNC: 107 MMOL/L (ref 98–112)
CO2 SERPL-SCNC: 30 MMOL/L (ref 21–32)
COMPLEXED PSA SERPL-MCNC: 0.82 NG/ML (ref ?–4)
CREAT BLD-MCNC: 1.25 MG/DL
CREAT UR-SCNC: 129 MG/DL
DEPRECATED RDW RBC AUTO: 36.3 FL (ref 35.1–46.3)
EOSINOPHIL # BLD AUTO: 0.16 X10(3) UL (ref 0–0.7)
EOSINOPHIL NFR BLD AUTO: 5.6 %
ERYTHROCYTE [DISTWIDTH] IN BLOOD BY AUTOMATED COUNT: 11.9 % (ref 11–15)
GLOBULIN PLAS-MCNC: 3.4 G/DL (ref 2.8–4.4)
GLUCOSE BLD-MCNC: 106 MG/DL (ref 70–99)
HCT VFR BLD AUTO: 39.6 %
HGB BLD-MCNC: 13.5 G/DL
IMM GRANULOCYTES # BLD AUTO: 0.01 X10(3) UL (ref 0–1)
IMM GRANULOCYTES NFR BLD: 0.4 %
LYMPHOCYTES # BLD AUTO: 0.75 X10(3) UL (ref 1–4)
LYMPHOCYTES NFR BLD AUTO: 26.4 %
M PROTEIN MFR SERPL ELPH: 6.8 G/DL (ref 6.4–8.2)
MCH RBC QN AUTO: 28.3 PG (ref 26–34)
MCHC RBC AUTO-ENTMCNC: 34.1 G/DL (ref 31–37)
MCV RBC AUTO: 83 FL
MONOCYTES # BLD AUTO: 0.28 X10(3) UL (ref 0.1–1)
MONOCYTES NFR BLD AUTO: 9.9 %
NEUTROPHILS # BLD AUTO: 1.62 X10 (3) UL (ref 1.5–7.7)
NEUTROPHILS # BLD AUTO: 1.62 X10(3) UL (ref 1.5–7.7)
NEUTROPHILS NFR BLD AUTO: 57 %
OSMOLALITY SERPL CALC.SUM OF ELEC: 293 MOSM/KG (ref 275–295)
PATIENT FASTING Y/N/NP: YES
PLATELET # BLD AUTO: 143 10(3)UL (ref 150–450)
POTASSIUM SERPL-SCNC: 3.3 MMOL/L (ref 3.5–5.1)
PROT UR-MCNC: 45.5 MG/DL
RBC # BLD AUTO: 4.77 X10(6)UL
SODIUM SERPL-SCNC: 141 MMOL/L (ref 136–145)
WBC # BLD AUTO: 2.8 X10(3) UL (ref 4–11)

## 2021-07-03 PROCEDURE — 36415 COLL VENOUS BLD VENIPUNCTURE: CPT

## 2021-07-03 PROCEDURE — 82570 ASSAY OF URINE CREATININE: CPT

## 2021-07-03 PROCEDURE — 80053 COMPREHEN METABOLIC PANEL: CPT

## 2021-07-03 PROCEDURE — 84156 ASSAY OF PROTEIN URINE: CPT

## 2021-07-03 PROCEDURE — 84244 ASSAY OF RENIN: CPT

## 2021-07-03 PROCEDURE — 85025 COMPLETE CBC W/AUTO DIFF WBC: CPT

## 2021-07-03 PROCEDURE — 82088 ASSAY OF ALDOSTERONE: CPT

## 2021-07-05 LAB — ALDOSTERONE: 22.7 NG/DL

## 2021-07-06 DIAGNOSIS — I10 ESSENTIAL HYPERTENSION: Primary | ICD-10-CM

## 2021-07-06 RX ORDER — POTASSIUM CHLORIDE 1500 MG/1
40 TABLET, FILM COATED, EXTENDED RELEASE ORAL DAILY
Qty: 6 TABLET | Refills: 0 | Status: SHIPPED | OUTPATIENT
Start: 2021-07-06 | End: 2021-07-09

## 2021-07-11 LAB — RENIN ACTIVITY: 3.9 NG/ML/HR

## 2021-07-16 ENCOUNTER — TELEPHONE (OUTPATIENT)
Dept: NEPHROLOGY | Facility: CLINIC | Age: 41
End: 2021-07-16

## 2021-07-19 ENCOUNTER — NURSE ONLY (OUTPATIENT)
Dept: NEPHROLOGY | Facility: CLINIC | Age: 41
End: 2021-07-19

## 2021-07-22 ENCOUNTER — VIRTUAL PHONE E/M (OUTPATIENT)
Dept: NEPHROLOGY | Facility: CLINIC | Age: 41
End: 2021-07-22
Payer: COMMERCIAL

## 2021-07-22 DIAGNOSIS — E87.6 HYPOKALEMIA: ICD-10-CM

## 2021-07-22 DIAGNOSIS — I10 ESSENTIAL HYPERTENSION: Primary | ICD-10-CM

## 2021-07-22 RX ORDER — SPIRONOLACTONE 25 MG/1
25 TABLET ORAL DAILY
Qty: 30 TABLET | Refills: 11 | Status: SHIPPED | OUTPATIENT
Start: 2021-07-22 | End: 2022-01-24

## 2021-07-22 NOTE — PROGRESS NOTES
Virtual Telephone Check-In    Mayra Juarez verbally consents to a Virtual/Telephone Check-In visit on 07/22/21. Patient has been referred to the [facility] website at [email or web address] to review the yearly Consent to Treat document.     Patient under

## 2021-08-12 ENCOUNTER — MED REC SCAN ONLY (OUTPATIENT)
Dept: NEPHROLOGY | Facility: CLINIC | Age: 41
End: 2021-08-12

## 2021-09-27 ENCOUNTER — OFFICE VISIT (OUTPATIENT)
Dept: INTERNAL MEDICINE CLINIC | Facility: CLINIC | Age: 41
End: 2021-09-27
Payer: COMMERCIAL

## 2021-09-27 VITALS
TEMPERATURE: 97 F | SYSTOLIC BLOOD PRESSURE: 132 MMHG | HEIGHT: 76 IN | WEIGHT: 272 LBS | BODY MASS INDEX: 33.12 KG/M2 | HEART RATE: 88 BPM | DIASTOLIC BLOOD PRESSURE: 84 MMHG

## 2021-09-27 DIAGNOSIS — I10 ESSENTIAL HYPERTENSION: ICD-10-CM

## 2021-09-27 DIAGNOSIS — L98.9 SKIN LESION: ICD-10-CM

## 2021-09-27 DIAGNOSIS — S86.011D STRAIN OF RIGHT ACHILLES TENDON, SUBSEQUENT ENCOUNTER: ICD-10-CM

## 2021-09-27 DIAGNOSIS — G47.10 HYPERSOMNIA: ICD-10-CM

## 2021-09-27 DIAGNOSIS — R06.83 SNORING: ICD-10-CM

## 2021-09-27 DIAGNOSIS — S86.001D INJURY OF RIGHT ACHILLES TENDON, SUBSEQUENT ENCOUNTER: Primary | ICD-10-CM

## 2021-09-27 DIAGNOSIS — H69.83 DYSFUNCTION OF BOTH EUSTACHIAN TUBES: ICD-10-CM

## 2021-09-27 DIAGNOSIS — N28.9 RENAL INSUFFICIENCY: ICD-10-CM

## 2021-09-27 PROCEDURE — 3075F SYST BP GE 130 - 139MM HG: CPT | Performed by: NURSE PRACTITIONER

## 2021-09-27 PROCEDURE — 99214 OFFICE O/P EST MOD 30 MIN: CPT | Performed by: NURSE PRACTITIONER

## 2021-09-27 PROCEDURE — 3008F BODY MASS INDEX DOCD: CPT | Performed by: NURSE PRACTITIONER

## 2021-09-27 PROCEDURE — 3079F DIAST BP 80-89 MM HG: CPT | Performed by: NURSE PRACTITIONER

## 2021-09-27 NOTE — PROGRESS NOTES
Morris Mercado is a 35year old male. Patient presents with: Follow - Up: LEE ANN rm 11 f/u      HPI:   Here for follow up   Was 25min late this am and rescheduled to this afternoon  He is now 10min late again.    HTN  bp136/88  Spironolactone 25mg added to 2 (two) times daily as needed.  30 g 1      Past Medical History:   Diagnosis Date   • Essential hypertension    • Extrinsic asthma, unspecified    • Sickle cell trait ([facility] [address]       Social History:  Social History    Tobacco Use      Smoking status: Former Sm zyrtec or allegra. Add flonase. Skin lesion  Follow  Cystic lsesion. Snoring  Sleep study ordered. Hypersomnia  Strain of right achilles tendon, subsequent encounter    No orders of the defined types were placed in this encounter.       Meds & Refi

## 2021-09-28 ENCOUNTER — OFFICE VISIT (OUTPATIENT)
Dept: PHYSICAL THERAPY | Facility: HOSPITAL | Age: 41
End: 2021-09-28
Attending: NURSE PRACTITIONER
Payer: COMMERCIAL

## 2021-09-28 ENCOUNTER — LAB ENCOUNTER (OUTPATIENT)
Dept: LAB | Age: 41
End: 2021-09-28
Attending: NURSE PRACTITIONER
Payer: COMMERCIAL

## 2021-09-28 ENCOUNTER — TELEPHONE (OUTPATIENT)
Dept: PHYSICAL THERAPY | Facility: HOSPITAL | Age: 41
End: 2021-09-28

## 2021-09-28 DIAGNOSIS — S86.011D STRAIN OF RIGHT ACHILLES TENDON, SUBSEQUENT ENCOUNTER: ICD-10-CM

## 2021-09-28 DIAGNOSIS — I10 ESSENTIAL HYPERTENSION: ICD-10-CM

## 2021-09-28 DIAGNOSIS — S86.001D INJURY OF RIGHT ACHILLES TENDON, SUBSEQUENT ENCOUNTER: ICD-10-CM

## 2021-09-28 LAB
ANION GAP SERPL CALC-SCNC: 4 MMOL/L (ref 0–18)
BUN BLD-MCNC: 20 MG/DL (ref 7–18)
CALCIUM BLD-MCNC: 8.7 MG/DL (ref 8.5–10.1)
CHLORIDE SERPL-SCNC: 109 MMOL/L (ref 98–112)
CO2 SERPL-SCNC: 26 MMOL/L (ref 21–32)
CREAT BLD-MCNC: 1.69 MG/DL
GLUCOSE BLD-MCNC: 101 MG/DL (ref 70–99)
OSMOLALITY SERPL CALC.SUM OF ELEC: 291 MOSM/KG (ref 275–295)
PATIENT FASTING Y/N/NP: NO
POTASSIUM SERPL-SCNC: 3.8 MMOL/L (ref 3.5–5.1)
SODIUM SERPL-SCNC: 139 MMOL/L (ref 136–145)

## 2021-09-28 PROCEDURE — 97161 PT EVAL LOW COMPLEX 20 MIN: CPT

## 2021-09-28 PROCEDURE — 97140 MANUAL THERAPY 1/> REGIONS: CPT

## 2021-09-28 PROCEDURE — 36415 COLL VENOUS BLD VENIPUNCTURE: CPT

## 2021-09-28 PROCEDURE — 80048 BASIC METABOLIC PNL TOTAL CA: CPT

## 2021-09-28 NOTE — PROGRESS NOTES
LOWER EXTREMITY EVALUATION:   Referring Physician: Dr. Karen Schrader  Diagnosis: Injury of right Achilles tendon, subsequent encounter (S86.001D)  Strain of right Achilles tendon, subsequent encounter (A48.161T)     Date of Service: 9/28/2021     PATIENT [facility] tests and measures show impaired range of motion into dorsiflexion, pain with heel raise. Functional deficits include but are not limited to impaired ability to jog for cardiovascular fitness or perform heel raises, pain with walking at work.   Signs and s CARE:    Goals: (to be met in 8 visits)  1. The patient will demonstrate at least 10 deg R ankle dorsiflexion with knee extended  2. The patient will demonstrate the ability to perform a single leg heel raise with no increase in pain  3.  The patient will r

## 2021-09-29 ENCOUNTER — TELEPHONE (OUTPATIENT)
Dept: NEPHROLOGY | Facility: CLINIC | Age: 41
End: 2021-09-29

## 2021-09-29 NOTE — TELEPHONE ENCOUNTER
Left VM for pt- higher Cr not unexpected after addition of aldactone; K normalized; hopefully BP better- thnery lewisg

## 2021-10-01 ENCOUNTER — OFFICE VISIT (OUTPATIENT)
Dept: PHYSICAL THERAPY | Facility: HOSPITAL | Age: 41
End: 2021-10-01
Attending: NURSE PRACTITIONER

## 2021-10-01 PROCEDURE — 97110 THERAPEUTIC EXERCISES: CPT

## 2021-10-01 PROCEDURE — 97140 MANUAL THERAPY 1/> REGIONS: CPT

## 2021-10-01 NOTE — PROGRESS NOTES
Dx: Injury of right Achilles tendon, subsequent encounter (S86.001D) Strain of right Achilles tendon, subsequent encounter (S86.011D)         Insurance (Authorized # of Visits):  8 POC           Authorizing Physician: Dr. Katerina Capone  Next MD visit: none schedul [id number]  Exercises  Standing Bilateral Heel Raise on Step - 1 x daily - 7 x weekly - 3 sets - 12-15 reps  Gastroc Stretch on Wall - 1 x daily - 7 x weekly - 1 sets - 3 reps - 30 sec hold    Charges: man there x1, there ex x2       Total Timed Treatment: 4

## 2021-10-04 ENCOUNTER — APPOINTMENT (OUTPATIENT)
Dept: PHYSICAL THERAPY | Facility: HOSPITAL | Age: 41
End: 2021-10-04
Attending: NURSE PRACTITIONER

## 2021-10-04 PROCEDURE — 97110 THERAPEUTIC EXERCISES: CPT

## 2021-10-04 NOTE — PROGRESS NOTES
Dx: Injury of right Achilles tendon, subsequent encounter (S86.001D) Strain of right Achilles tendon, subsequent encounter (S86.011D)         Insurance (Authorized # of Visits):  8 POC           Authorizing Physician: Dr. Wing ref.  provider found  Next MD durant side of BOSU 2x30 sec ea  Gastroc stretching tilt board 2x30 sec ea There ex  Elliptical 3 min L6  Shuttle SL heel raise 31# 3x12 ea  Bridge 60 sec hold  SL bridge 30 sec ea, cues for reducing hip drop on R.    Calf stretch 2x30 sec gastroc/soleus      Man

## 2021-10-11 ENCOUNTER — APPOINTMENT (OUTPATIENT)
Dept: PHYSICAL THERAPY | Facility: HOSPITAL | Age: 41
End: 2021-10-11
Attending: NURSE PRACTITIONER

## 2021-10-11 ENCOUNTER — TELEPHONE (OUTPATIENT)
Dept: PHYSICAL THERAPY | Facility: HOSPITAL | Age: 41
End: 2021-10-11

## 2021-10-18 ENCOUNTER — APPOINTMENT (OUTPATIENT)
Dept: PHYSICAL THERAPY | Facility: HOSPITAL | Age: 41
End: 2021-10-18
Attending: NURSE PRACTITIONER

## 2021-10-19 ENCOUNTER — TELEPHONE (OUTPATIENT)
Dept: PHYSICAL THERAPY | Facility: HOSPITAL | Age: 41
End: 2021-10-19

## 2021-10-19 ENCOUNTER — APPOINTMENT (OUTPATIENT)
Dept: PHYSICAL THERAPY | Facility: HOSPITAL | Age: 41
End: 2021-10-19
Attending: NURSE PRACTITIONER

## 2021-10-19 ENCOUNTER — OFFICE VISIT (OUTPATIENT)
Dept: SLEEP CENTER | Age: 41
End: 2021-10-19
Attending: INTERNAL MEDICINE
Payer: COMMERCIAL

## 2021-10-19 DIAGNOSIS — G47.10 HYPERSOMNIA: ICD-10-CM

## 2021-10-19 DIAGNOSIS — R06.83 SNORING: ICD-10-CM

## 2021-10-19 PROCEDURE — 95806 SLEEP STUDY UNATT&RESP EFFT: CPT

## 2021-10-25 ENCOUNTER — APPOINTMENT (OUTPATIENT)
Dept: PHYSICAL THERAPY | Facility: HOSPITAL | Age: 41
End: 2021-10-25
Attending: NURSE PRACTITIONER

## 2021-10-27 DIAGNOSIS — G47.33 OSA (OBSTRUCTIVE SLEEP APNEA): Primary | ICD-10-CM

## 2021-10-27 NOTE — PROCEDURES
[address] [address]       Accredited by the [facility] of [facility] ([employer])    PATIENT'S NAME:        Chaparro Rowe  ATTENDING PHYSICIAN:   Germán Ardon M.D.   REFERRING PHYSICIAN:   SHANIQUE Malone minute. PERIODIC LIMB MOVEMENTS:  Periodic limb movements could not be assessed with the equipment used. IMPRESSION:  This study demonstrates severe obstructive sleep apnea-hypopnea syndrome with significant oxyhemoglobin desaturations.     Jorge Young

## 2021-10-28 RX ORDER — AMLODIPINE BESYLATE 5 MG/1
TABLET ORAL
Qty: 135 TABLET | Refills: 1 | Status: SHIPPED | OUTPATIENT
Start: 2021-10-28 | End: 2022-01-24

## 2021-10-28 NOTE — TELEPHONE ENCOUNTER
Last visit- 09/27/2021     Last refill- 06/07/2021 amlodipine besylate 5mg [id number] 1R    Last labs- 09/28/2021 bmp   Future Appointments   Date Time Provider Krystal Rasheed   11/1/2021  6:15 PM Doc Kelly, PT [address] [facility] PHYS [address]   11/3/2021  6:15 PM

## 2021-11-01 ENCOUNTER — APPOINTMENT (OUTPATIENT)
Dept: PHYSICAL THERAPY | Facility: HOSPITAL | Age: 41
End: 2021-11-01
Attending: NURSE PRACTITIONER

## 2021-11-03 ENCOUNTER — APPOINTMENT (OUTPATIENT)
Dept: PHYSICAL THERAPY | Facility: HOSPITAL | Age: 41
End: 2021-11-03
Attending: NURSE PRACTITIONER

## 2021-12-03 ENCOUNTER — APPOINTMENT (OUTPATIENT)
Dept: GENERAL RADIOLOGY | Age: 41
End: 2021-12-03
Attending: PHYSICIAN ASSISTANT
Payer: COMMERCIAL

## 2021-12-03 ENCOUNTER — HOSPITAL ENCOUNTER (OUTPATIENT)
Age: 41
Discharge: HOME | End: 2021-12-03
Payer: COMMERCIAL

## 2021-12-03 ENCOUNTER — OFFICE VISIT (OUTPATIENT)
Dept: FAMILY MEDICINE CLINIC | Facility: CLINIC | Age: 41
End: 2021-12-03
Payer: COMMERCIAL

## 2021-12-03 VITALS
HEART RATE: 88 BPM | RESPIRATION RATE: 16 BRPM | OXYGEN SATURATION: 98 % | BODY MASS INDEX: 32.27 KG/M2 | SYSTOLIC BLOOD PRESSURE: 147 MMHG | TEMPERATURE: 98 F | HEIGHT: 76 IN | WEIGHT: 265 LBS | DIASTOLIC BLOOD PRESSURE: 112 MMHG

## 2021-12-03 VITALS
RESPIRATION RATE: 16 BRPM | TEMPERATURE: 98 F | HEART RATE: 90 BPM | WEIGHT: 265 LBS | OXYGEN SATURATION: 97 % | BODY MASS INDEX: 32.27 KG/M2 | HEIGHT: 76 IN | SYSTOLIC BLOOD PRESSURE: 128 MMHG | DIASTOLIC BLOOD PRESSURE: 98 MMHG

## 2021-12-03 DIAGNOSIS — Z02.9 ENCOUNTERS FOR ADMINISTRATIVE PURPOSE: Primary | ICD-10-CM

## 2021-12-03 DIAGNOSIS — M10.9 ACUTE GOUT INVOLVING TOE OF LEFT FOOT, UNSPECIFIED CAUSE: Primary | ICD-10-CM

## 2021-12-03 PROCEDURE — 99213 OFFICE O/P EST LOW 20 MIN: CPT | Performed by: PHYSICIAN ASSISTANT

## 2021-12-03 PROCEDURE — 3080F DIAST BP >= 90 MM HG: CPT | Performed by: PHYSICIAN ASSISTANT

## 2021-12-03 PROCEDURE — 3008F BODY MASS INDEX DOCD: CPT | Performed by: PHYSICIAN ASSISTANT

## 2021-12-03 PROCEDURE — 3077F SYST BP >= 140 MM HG: CPT | Performed by: PHYSICIAN ASSISTANT

## 2021-12-03 PROCEDURE — 73630 X-RAY EXAM OF FOOT: CPT | Performed by: PHYSICIAN ASSISTANT

## 2021-12-03 PROCEDURE — 3074F SYST BP LT 130 MM HG: CPT | Performed by: PHYSICIAN ASSISTANT

## 2021-12-03 RX ORDER — INDOMETHACIN 50 MG/1
50 CAPSULE ORAL
Qty: 15 CAPSULE | Refills: 0 | Status: SHIPPED | OUTPATIENT
Start: 2021-12-03 | End: 2021-12-08

## 2021-12-03 NOTE — ED INITIAL ASSESSMENT (HPI)
Pt c/o pain and swelling to his left foot . Pt states symptoms started on Wednesday. Pt states ran about 1 mile on Wednesday.

## 2021-12-03 NOTE — PROGRESS NOTES
Sully Hannah is a 35year old male. HPI:   The patient complains of  left ankle pain. Sx's started  Last night. Mechanism of injury: no known injury. Pt has had swelling of the ankle/bunion joint and across the foot.  Mostly tender at bunion joint denies chest pain on exertion  MSK: : see HPI    EXAM:   BP (!) 128/98   Pulse 90   Temp 98.3 °F (36.8 °C) (Temporal)   Resp 16   Ht 6' 4\" (1.93 m)   Wt 265 lb (120.2 kg)   SpO2 97%   BMI 32.26 kg/m²   GENERAL: well developed, well nourished,in no apparen

## 2021-12-04 NOTE — ED PROVIDER NOTES
[facility] [address]      History   Patient presents with:  Pain    Stated Complaint: left ankle/foot swelling, pain x 1 day     Subjective:   HPI    22-year-old male presents to the IC with left foot pain since yesterday.   Pa Mild erythema. Normal range of motion. No pain in the rest of the left foot. 2+ DP pulse. Skin:     General: Skin is warm and dry. Capillary Refill: Capillary refill takes less than 2 seconds.    Neurological:      Mental Status: He is alert and o Disposition and Plan     Clinical Impression:  Acute gout involving toe of left foot, unspecified cause  (primary encounter diagnosis)     Disposition:  Discharge  12/3/2021  6:21 pm    Follow-up:  MD Arabella Bro

## 2021-12-20 ENCOUNTER — TELEPHONE (OUTPATIENT)
Dept: INTERNAL MEDICINE CLINIC | Facility: CLINIC | Age: 41
End: 2021-12-20

## 2021-12-20 DIAGNOSIS — U07.1 COVID: Primary | ICD-10-CM

## 2021-12-20 NOTE — TELEPHONE ENCOUNTER
Sent to me in error?  Patient's PCP is Dr. Georges Carreon and patient has seen Kimberly in the past.

## 2021-12-20 NOTE — TELEPHONE ENCOUNTER
Spoke with pt. Pt got exposed on Friday 12/10. Was tested on Monday, this was negative. Pt said that he developed minor sx on Wednesday (12/15)- mild cough/congestion. Friday he developed chills/fever/sweating, which led him to getting retested.  This came

## 2021-12-20 NOTE — TELEPHONE ENCOUNTER
Pt had rapid covid test done on Friday at drive up testing site for congestion/fever on Friday/chills and lack of energy-he wants to know what he should be doing other than quarantine?  Call to advise

## 2021-12-21 ENCOUNTER — TELEPHONE (OUTPATIENT)
Dept: CASE MANAGEMENT | Age: 41
End: 2021-12-21

## 2021-12-21 NOTE — TELEPHONE ENCOUNTER
Order placed for MAB. Patient was agreeable. Testing 12/18/2021, covid symptom onset 12/15/2021. Encounter closed.

## 2021-12-22 ENCOUNTER — APPOINTMENT (OUTPATIENT)
Dept: GENERAL RADIOLOGY | Facility: HOSPITAL | Age: 41
DRG: 177 | End: 2021-12-22
Attending: EMERGENCY MEDICINE
Payer: COMMERCIAL

## 2021-12-22 ENCOUNTER — HOSPITAL ENCOUNTER (INPATIENT)
Facility: HOSPITAL | Age: 41
LOS: 5 days | Discharge: HOME | DRG: 177 | End: 2021-12-27
Attending: EMERGENCY MEDICINE | Admitting: INTERNAL MEDICINE
Payer: COMMERCIAL

## 2021-12-22 ENCOUNTER — APPOINTMENT (OUTPATIENT)
Dept: ULTRASOUND IMAGING | Facility: HOSPITAL | Age: 41
DRG: 177 | End: 2021-12-22
Attending: INTERNAL MEDICINE
Payer: COMMERCIAL

## 2021-12-22 DIAGNOSIS — U07.1 PNEUMONIA DUE TO COVID-19 VIRUS: Primary | ICD-10-CM

## 2021-12-22 DIAGNOSIS — J12.82 PNEUMONIA DUE TO COVID-19 VIRUS: Primary | ICD-10-CM

## 2021-12-22 DIAGNOSIS — R09.02 HYPOXEMIA: ICD-10-CM

## 2021-12-22 LAB
ALBUMIN SERPL-MCNC: 2.8 G/DL (ref 3.4–5)
ALBUMIN SERPL-MCNC: 2.8 G/DL (ref 3.4–5)
ALBUMIN/GLOB SERPL: 0.7 {RATIO} (ref 1–2)
ALBUMIN/GLOB SERPL: 0.8 {RATIO} (ref 1–2)
ALP LIVER SERPL-CCNC: 28 U/L
ALP LIVER SERPL-CCNC: 31 U/L
ALT SERPL-CCNC: 39 U/L
ALT SERPL-CCNC: 39 U/L
ANION GAP SERPL CALC-SCNC: 10 MMOL/L (ref 0–18)
ANION GAP SERPL CALC-SCNC: 9 MMOL/L (ref 0–18)
ARTERIAL PATENCY WRIST A: POSITIVE
AST SERPL-CCNC: 36 U/L (ref 15–37)
AST SERPL-CCNC: 36 U/L (ref 15–37)
BASE EXCESS BLDA CALC-SCNC: -0.3 MMOL/L (ref ?–2)
BASOPHILS # BLD AUTO: 0 X10(3) UL (ref 0–0.2)
BASOPHILS NFR BLD AUTO: 0 %
BILIRUB SERPL-MCNC: 0.4 MG/DL (ref 0.1–2)
BILIRUB SERPL-MCNC: 0.6 MG/DL (ref 0.1–2)
BODY TEMPERATURE: 98.6 F
BUN BLD-MCNC: 17 MG/DL (ref 7–18)
BUN BLD-MCNC: 20 MG/DL (ref 7–18)
CALCIUM BLD-MCNC: 8 MG/DL (ref 8.5–10.1)
CALCIUM BLD-MCNC: 8.2 MG/DL (ref 8.5–10.1)
CHLORIDE SERPL-SCNC: 101 MMOL/L (ref 98–112)
CHLORIDE SERPL-SCNC: 102 MMOL/L (ref 98–112)
CK SERPL-CCNC: 404 U/L
CO2 SERPL-SCNC: 24 MMOL/L (ref 21–32)
CO2 SERPL-SCNC: 24 MMOL/L (ref 21–32)
COHGB MFR BLD: 1.4 % SAT (ref 0–3)
CREAT BLD-MCNC: 1.59 MG/DL
CREAT BLD-MCNC: 1.64 MG/DL
CRP SERPL-MCNC: 15.1 MG/DL (ref ?–0.3)
D DIMER PPP FEU-MCNC: 1.22 UG/ML FEU (ref ?–0.5)
DEPRECATED HBV CORE AB SER IA-ACNC: 652.6 NG/ML
EOSINOPHIL # BLD AUTO: 0 X10(3) UL (ref 0–0.7)
EOSINOPHIL NFR BLD AUTO: 0 %
ERYTHROCYTE [DISTWIDTH] IN BLOOD BY AUTOMATED COUNT: 11.8 %
GLOBULIN PLAS-MCNC: 3.7 G/DL (ref 2.8–4.4)
GLOBULIN PLAS-MCNC: 3.9 G/DL (ref 2.8–4.4)
GLUCOSE BLD-MCNC: 112 MG/DL (ref 70–99)
GLUCOSE BLD-MCNC: 131 MG/DL (ref 70–99)
HCO3 BLDA-SCNC: 22.7 MEQ/L (ref 22–26)
HCT VFR BLD AUTO: 40 %
HGB BLD-MCNC: 14 G/DL
HGB BLD-MCNC: 14.5 G/DL
IMM GRANULOCYTES # BLD AUTO: 0.01 X10(3) UL (ref 0–1)
IMM GRANULOCYTES NFR BLD: 0.4 %
L/M: 2 L/MIN
LDH SERPL L TO P-CCNC: 201 U/L
LYMPHOCYTES # BLD AUTO: 0.36 X10(3) UL (ref 1–4)
LYMPHOCYTES NFR BLD AUTO: 15.3 %
MCH RBC QN AUTO: 28.4 PG (ref 26–34)
MCHC RBC AUTO-ENTMCNC: 35 G/DL (ref 31–37)
MCV RBC AUTO: 81.1 FL
METHGB MFR BLD: 0.7 % SAT (ref 0.4–1.5)
MONOCYTES # BLD AUTO: 0.25 X10(3) UL (ref 0.1–1)
MONOCYTES NFR BLD AUTO: 10.6 %
NEUTROPHILS # BLD AUTO: 1.74 X10 (3) UL (ref 1.5–7.7)
NEUTROPHILS # BLD AUTO: 1.74 X10(3) UL (ref 1.5–7.7)
NEUTROPHILS NFR BLD AUTO: 73.7 %
NT-PROBNP SERPL-MCNC: 50 PG/ML (ref ?–125)
OSMOLALITY SERPL CALC.SUM OF ELEC: 282 MOSM/KG (ref 275–295)
OSMOLALITY SERPL CALC.SUM OF ELEC: 284 MOSM/KG (ref 275–295)
PCO2 BLDA: 33 MM HG (ref 35–45)
PH BLDA: 7.46 [PH] (ref 7.35–7.45)
PLATELET # BLD AUTO: 132 10(3)UL (ref 150–450)
PO2 BLDA: 60 MM HG (ref 80–105)
POTASSIUM SERPL-SCNC: 3.5 MMOL/L (ref 3.5–5.1)
POTASSIUM SERPL-SCNC: 3.6 MMOL/L (ref 3.5–5.1)
PROCALCITONIN SERPL-MCNC: 0.23 NG/ML (ref ?–0.16)
PROT SERPL-MCNC: 6.5 G/DL (ref 6.4–8.2)
PROT SERPL-MCNC: 6.7 G/DL (ref 6.4–8.2)
RBC # BLD AUTO: 4.93 X10(6)UL
SAO2 % BLDA FROM PO2: 92 % (ref 92–100)
SAO2 % BLDA: 90 % (ref 92–100)
SODIUM SERPL-SCNC: 135 MMOL/L (ref 136–145)
SODIUM SERPL-SCNC: 135 MMOL/L (ref 136–145)
TROPONIN I HIGH SENSITIVITY: 17 NG/L
WBC # BLD AUTO: 2.4 X10(3) UL (ref 4–11)

## 2021-12-22 PROCEDURE — 99223 1ST HOSP IP/OBS HIGH 75: CPT | Performed by: INTERNAL MEDICINE

## 2021-12-22 PROCEDURE — XW033E5 INTRODUCTION OF REMDESIVIR ANTI-INFECTIVE INTO PERIPHERAL VEIN, PERCUTANEOUS APPROACH, NEW TECHNOLOGY GROUP 5: ICD-10-PCS | Performed by: HOSPITALIST

## 2021-12-22 PROCEDURE — 93970 EXTREMITY STUDY: CPT | Performed by: INTERNAL MEDICINE

## 2021-12-22 PROCEDURE — 3E0333Z INTRODUCTION OF ANTI-INFLAMMATORY INTO PERIPHERAL VEIN, PERCUTANEOUS APPROACH: ICD-10-PCS | Performed by: HOSPITALIST

## 2021-12-22 PROCEDURE — 71045 X-RAY EXAM CHEST 1 VIEW: CPT | Performed by: EMERGENCY MEDICINE

## 2021-12-22 RX ORDER — MELATONIN
3 NIGHTLY PRN
Status: DISCONTINUED | OUTPATIENT
Start: 2021-12-22 | End: 2021-12-27

## 2021-12-22 RX ORDER — DEXAMETHASONE SODIUM PHOSPHATE 10 MG/ML
8 INJECTION, SOLUTION INTRAMUSCULAR; INTRAVENOUS ONCE
Status: COMPLETED | OUTPATIENT
Start: 2021-12-22 | End: 2021-12-22

## 2021-12-22 RX ORDER — ASCORBIC ACID 500 MG
1000 TABLET ORAL DAILY
Status: DISCONTINUED | OUTPATIENT
Start: 2021-12-22 | End: 2021-12-22

## 2021-12-22 RX ORDER — ZINC SULFATE 50(220)MG
220 CAPSULE ORAL 2 TIMES DAILY
Status: DISCONTINUED | OUTPATIENT
Start: 2021-12-22 | End: 2021-12-22

## 2021-12-22 RX ORDER — HEPARIN SODIUM 5000 [USP'U]/ML
5000 INJECTION, SOLUTION INTRAVENOUS; SUBCUTANEOUS EVERY 8 HOURS SCHEDULED
Status: DISCONTINUED | OUTPATIENT
Start: 2021-12-22 | End: 2021-12-27

## 2021-12-22 RX ORDER — SPIRONOLACTONE 25 MG/1
25 TABLET ORAL DAILY
Status: DISCONTINUED | OUTPATIENT
Start: 2021-12-22 | End: 2021-12-27

## 2021-12-22 RX ORDER — ALBUTEROL SULFATE 90 UG/1
2 AEROSOL, METERED RESPIRATORY (INHALATION) EVERY 6 HOURS PRN
Status: DISCONTINUED | OUTPATIENT
Start: 2021-12-22 | End: 2021-12-27

## 2021-12-22 RX ORDER — PROCHLORPERAZINE EDISYLATE 5 MG/ML
5 INJECTION INTRAMUSCULAR; INTRAVENOUS EVERY 8 HOURS PRN
Status: DISCONTINUED | OUTPATIENT
Start: 2021-12-22 | End: 2021-12-27

## 2021-12-22 RX ORDER — BENZONATATE 200 MG/1
200 CAPSULE ORAL 3 TIMES DAILY PRN
Status: DISCONTINUED | OUTPATIENT
Start: 2021-12-22 | End: 2021-12-27

## 2021-12-22 RX ORDER — LABETALOL HYDROCHLORIDE 5 MG/ML
10 INJECTION, SOLUTION INTRAVENOUS EVERY 4 HOURS PRN
Status: DISCONTINUED | OUTPATIENT
Start: 2021-12-22 | End: 2021-12-27

## 2021-12-22 RX ORDER — CHOLECALCIFEROL (VITAMIN D3) 125 MCG
2000 CAPSULE ORAL DAILY
Status: DISCONTINUED | OUTPATIENT
Start: 2021-12-22 | End: 2021-12-22

## 2021-12-22 RX ORDER — GUAIFENESIN 600 MG
600 TABLET, EXTENDED RELEASE 12 HR ORAL 2 TIMES DAILY
Status: DISCONTINUED | OUTPATIENT
Start: 2021-12-22 | End: 2021-12-27

## 2021-12-22 RX ORDER — ONDANSETRON 2 MG/ML
4 INJECTION INTRAMUSCULAR; INTRAVENOUS EVERY 6 HOURS PRN
Status: DISCONTINUED | OUTPATIENT
Start: 2021-12-22 | End: 2021-12-27

## 2021-12-22 RX ORDER — DEXAMETHASONE SODIUM PHOSPHATE 4 MG/ML
6 VIAL (ML) INJECTION DAILY
Status: DISCONTINUED | OUTPATIENT
Start: 2021-12-23 | End: 2021-12-27

## 2021-12-22 RX ORDER — IRBESARTAN AND HYDROCHLOROTHIAZIDE 300; 12.5 MG/1; MG/1
1 TABLET, FILM COATED ORAL DAILY
Status: DISCONTINUED | OUTPATIENT
Start: 2021-12-22 | End: 2021-12-22 | Stop reason: RX

## 2021-12-22 RX ORDER — ACETAMINOPHEN 325 MG/1
650 TABLET ORAL EVERY 6 HOURS PRN
Status: DISCONTINUED | OUTPATIENT
Start: 2021-12-22 | End: 2021-12-27

## 2021-12-22 NOTE — BH RN ADMISSION NOTE
NURSING ADMISSION NOTE      Patient admitted via Ambulance  Oriented to room 525. Safety precautions initiated. Bed in low position. Call light in reach. Patient came from ER. Covid+. Placed on telemetry and . Placed 8L on .  Admission navig

## 2021-12-22 NOTE — PLAN OF CARE
COVID-19 Daily Discharge Readiness-Nursing    O2 Sat at Rest:   91  % on 2L  O2 Sat with Exertion:   88 % on  2  liters   Temperature max from last 24 hrs: Temp (24hrs), Av.1 °F (36.7 °C), Min:98 °F (36.7 °C), Max:98.2 °F (36.8 °C)    Inflammatory Ma

## 2021-12-22 NOTE — PROGRESS NOTES
Pt falling asleep mid-conversation, staring into space flipping lights on and off on remote controller. Seems slightly confused/ off. Wife stated pt oxygen was \"very low\" at home prior to pt coming in. Only on 2L O2 now. MD paged. ABG ordered.

## 2021-12-22 NOTE — ED QUICK NOTES
Orders for admission, patient is aware of plan and ready to go upstairs. Any questions, please call ED VINICIUS Buitrago at extension [phone].      Patient Covid vaccination status: Unvaccinated     COVID Test Ordered in ED: None    COVID Suspicion at Admission: N/A

## 2021-12-22 NOTE — ED PROVIDER NOTES
Patient Seen in: [facility] Emergency Department      History   Patient presents with:  Covid    Stated Complaint: covid +, req monoclonal antibx    Subjective:   HPI    49-year-old male presents reporting 7 days of symptoms with a positive Covid te Sclerae anicteric. Conjunctivae show no pallor. Oropharynx clear, mucous membranes moist   Neck: supple, no rigidity   Lungs: good air exchange without wheeze.   There are crackles at the bases bilaterally concerning for Covid pneumonia  Heart: regular ra -----------         ------                     CBC W/ DIFFERENTIAL[[id number]]          Abnormal            Final result                 Please view results for these tests on the individual orders.    PROCALCITONIN   URINALYSIS W room air when walking. He improves with rest.  Will require hospitalization for COVID-19 infection with hypoxemia. Labs and chest x-ray ordered. Plan for steroids and remdesivir.   Infectious disease placed on consult and admitted to the [facility]

## 2021-12-22 NOTE — PLAN OF CARE
COVID-19 Daily Discharge Readiness-Nursing  Patient is Ax04. Placed on telemetry and . Placed on 8L upon admission. Afebrile. Denied pain. Continent. Saline lock. Regular diet. Up with SBA.    O2 Sat at Rest:  92   %   O2 Sat with Exertion:   n/a   [place] respiratory status  - Assess for changes in mentation and behavior  - Position to facilitate oxygenation and minimize respiratory effort  - Oxygen supplementation based on oxygen saturation or ABGs  - Provide Smoking Cessation handout, if applicable  - Enc

## 2021-12-22 NOTE — CONSULTS
INFECTIOUS DISEASE Boby 35 Patient Status:  Inpatient    1980 MRN [id number]   [facility] 5NW-A Attending Chris Dow MD   Hosp Day # 0 EJ Montgomery, melatonin tab 3 mg, 3 mg, Oral, Nightly PRN  •  ondansetron (ZOFRAN) injection 4 mg, 4 mg, Intravenous, Q6H PRN  •  prochlorperazine (COMPAZINE) injection 5 mg, 5 mg, Intravenous, Q8H PRN  •  [START ON 12/23/2021] dexamethasone (DECADRON) tab 6 mg, 6 mg, O °C)-98.2 °F (36.8 °C)] 98.2 °F (36.8 °C)  Pulse:  [] 84  Resp:  [20] 20  BP: (119-161)/(79-97) 131/79  HEENT: Moist mucous membranes. Extraocular muscles are intact.   Neck: No swelling, no masses  Respiratory: Non labored, symmetric exursion  Cardiov pneumonia  -unvaccinated  Exposure from girlfriend/girlfiend's daughter    Onset of symptoms 12/15  Tested positive 12/17  -MAB ordered on 12/20, could not be scheduled and was sent to ED    Admitted 12/22, hypoxia    Decadron  Remdesivir up to 5 days  Pro

## 2021-12-22 NOTE — H&P
[facility] HOSPITALIST  History and Physical     Brayan Morris Patient Status:  Emergency    1980 MRN [id number]   Location [facility] [facility] Attending Drake Ortiz MD   Hosp Day # 0 PCP Leanne Lynch MD     Chief Complaint: tablet by mouth daily. , Disp: 90 tablet, Rfl: 1  ALBUTEROL SULFATE  (90 Base) MCG/ACT Inhalation Aero Soln, INHALE 2 PUFFS INTO THE LUNGS EVERY 6 HOURS AS NEEDED FOR WHEEZING, Disp: 8.5 g, Rfl: 0  triamcinolone acetonide 0.1 % External Cream, Apply Kinase  Recent Labs   Lab 12/22/21 0210   *       Inflammatory Markers  Recent Labs   Lab 12/22/21 0210   CRP 15.10*   NASIR 652.6*      DDIMER 1.22*       Imaging: Imaging data reviewed in Epic.       ASSESSMENT / PLAN:     #Acute hypoxic resp

## 2021-12-23 LAB
ALBUMIN SERPL-MCNC: 2.6 G/DL (ref 3.4–5)
ALBUMIN/GLOB SERPL: 0.7 {RATIO} (ref 1–2)
ALP LIVER SERPL-CCNC: 30 U/L
ALT SERPL-CCNC: 42 U/L
ANION GAP SERPL CALC-SCNC: 7 MMOL/L (ref 0–18)
AST SERPL-CCNC: 34 U/L (ref 15–37)
BASOPHILS # BLD AUTO: 0 X10(3) UL (ref 0–0.2)
BASOPHILS NFR BLD AUTO: 0 %
BILIRUB SERPL-MCNC: 0.3 MG/DL (ref 0.1–2)
BUN BLD-MCNC: 26 MG/DL (ref 7–18)
CALCIUM BLD-MCNC: 8.2 MG/DL (ref 8.5–10.1)
CHLORIDE SERPL-SCNC: 107 MMOL/L (ref 98–112)
CO2 SERPL-SCNC: 26 MMOL/L (ref 21–32)
CREAT BLD-MCNC: 1.53 MG/DL
CRP SERPL-MCNC: 9.65 MG/DL (ref ?–0.3)
D DIMER PPP FEU-MCNC: 1.21 UG/ML FEU (ref ?–0.5)
DEPRECATED HBV CORE AB SER IA-ACNC: 773.4 NG/ML
EOSINOPHIL # BLD AUTO: 0 X10(3) UL (ref 0–0.7)
EOSINOPHIL NFR BLD AUTO: 0 %
ERYTHROCYTE [DISTWIDTH] IN BLOOD BY AUTOMATED COUNT: 11.8 %
GLOBULIN PLAS-MCNC: 3.5 G/DL (ref 2.8–4.4)
GLUCOSE BLD-MCNC: 118 MG/DL (ref 70–99)
HCT VFR BLD AUTO: 38.1 %
HGB BLD-MCNC: 13.1 G/DL
IMM GRANULOCYTES # BLD AUTO: 0.01 X10(3) UL (ref 0–1)
IMM GRANULOCYTES NFR BLD: 0.2 %
LDH SERPL L TO P-CCNC: 186 U/L
LYMPHOCYTES # BLD AUTO: 0.32 X10(3) UL (ref 1–4)
LYMPHOCYTES NFR BLD AUTO: 7.9 %
MCH RBC QN AUTO: 28.1 PG (ref 26–34)
MCHC RBC AUTO-ENTMCNC: 34.4 G/DL (ref 31–37)
MCV RBC AUTO: 81.8 FL
MONOCYTES # BLD AUTO: 0.32 X10(3) UL (ref 0.1–1)
MONOCYTES NFR BLD AUTO: 7.9 %
NEUTROPHILS # BLD AUTO: 3.39 X10 (3) UL (ref 1.5–7.7)
NEUTROPHILS # BLD AUTO: 3.39 X10(3) UL (ref 1.5–7.7)
NEUTROPHILS NFR BLD AUTO: 84 %
OSMOLALITY SERPL CALC.SUM OF ELEC: 296 MOSM/KG (ref 275–295)
PLATELET # BLD AUTO: 150 10(3)UL (ref 150–450)
POTASSIUM SERPL-SCNC: 3.8 MMOL/L (ref 3.5–5.1)
PROT SERPL-MCNC: 6.1 G/DL (ref 6.4–8.2)
RBC # BLD AUTO: 4.66 X10(6)UL
SODIUM SERPL-SCNC: 140 MMOL/L (ref 136–145)
WBC # BLD AUTO: 4 X10(3) UL (ref 4–11)

## 2021-12-23 PROCEDURE — 99232 SBSQ HOSP IP/OBS MODERATE 35: CPT | Performed by: INTERNAL MEDICINE

## 2021-12-23 NOTE — COVID NURSING ASSESSMENT
COVID-19 Daily Discharge Readiness-Nursing    O2 Sat at Rest:     %   O2 Sat with Exertion:    % on    liters   Temperature max from last 24 hrs: Temp (24hrs), Av.9 °F (36.6 °C), Min:97.6 °F (36.4 °C), Max:98.2 °F (36.8 °C)    Inflammatory Markers: Rec

## 2021-12-23 NOTE — PROGRESS NOTES
[facility]     Hospitalist Progress Note     Leighann Palacios Patient Status:  Inpatient    1980 MRN [id number]   [facility] 5NW-A Attending Odessa Deal,    Hosp Day # 1 PCP Rico Blanton MD     Chief Complaint: Dyspnea 652.6* 773.4*    186   DDIMER 1.22* 1.21*       Imaging: Imaging data reviewed in Epic.     Medications:   • amLODIPine  7.5 mg Oral Daily   • guaiFENesin ER  600 mg Oral BID   • Heparin Sodium (Porcine)  5,000 Units Subcutaneous Q8H [facility] HOME   • dexameth

## 2021-12-23 NOTE — PROGRESS NOTES
12/23/21 1223   Mobility   O2 walk?  Yes   SPO2% on Room Air at Rest 86   SPO2% on Oxygen at Rest 93   At rest oxygen flow (liters per minute) 2   SPO2% Ambulation on Oxygen 91   Ambulation oxygen flow (liters per minute) 10

## 2021-12-23 NOTE — PLAN OF CARE
Problem: Patient/Family Goals  Goal: Patient/Family Long Term Goal  Description: Patient's Long Term Goal:   Discharge to home     Interventions:  - Follow plan of care  - See additional Care Plan goals for specific interventions  12/23/2021 0947 by Gil Lawler

## 2021-12-23 NOTE — PLAN OF CARE
Problem: Patient/Family Goals  Goal: Patient/Family Long Term Goal  Description: Patient's Long Term Goal:   Discharge to home     Interventions:  - Follow plan of care  - See additional Care Plan goals for specific interventions  Outcome: Nathaniel Pickett

## 2021-12-23 NOTE — PLAN OF CARE
Problem: Patient/Family Goals  Goal: Patient/Family Long Term Goal  Description: Patient's Long Term Goal:   Discharge to home     Interventions:  - Follow plan of care  - See additional Care Plan goals for specific interventions  Outcome: Rob Allen

## 2021-12-24 LAB
ALBUMIN SERPL-MCNC: 2.5 G/DL (ref 3.4–5)
ALBUMIN/GLOB SERPL: 0.7 {RATIO} (ref 1–2)
ALP LIVER SERPL-CCNC: 36 U/L
ALT SERPL-CCNC: 47 U/L
ANION GAP SERPL CALC-SCNC: 8 MMOL/L (ref 0–18)
AST SERPL-CCNC: 34 U/L (ref 15–37)
BASOPHILS # BLD AUTO: 0 X10(3) UL (ref 0–0.2)
BASOPHILS NFR BLD AUTO: 0 %
BILIRUB SERPL-MCNC: 0.2 MG/DL (ref 0.1–2)
BUN BLD-MCNC: 27 MG/DL (ref 7–18)
CALCIUM BLD-MCNC: 7.9 MG/DL (ref 8.5–10.1)
CHLORIDE SERPL-SCNC: 111 MMOL/L (ref 98–112)
CO2 SERPL-SCNC: 24 MMOL/L (ref 21–32)
CREAT BLD-MCNC: 1.47 MG/DL
CRP SERPL-MCNC: 5.92 MG/DL (ref ?–0.3)
D DIMER PPP FEU-MCNC: 1.02 UG/ML FEU (ref ?–0.5)
DEPRECATED HBV CORE AB SER IA-ACNC: 673.4 NG/ML
EOSINOPHIL # BLD AUTO: 0 X10(3) UL (ref 0–0.7)
EOSINOPHIL NFR BLD AUTO: 0 %
ERYTHROCYTE [DISTWIDTH] IN BLOOD BY AUTOMATED COUNT: 11.9 %
GLOBULIN PLAS-MCNC: 3.4 G/DL (ref 2.8–4.4)
GLUCOSE BLD-MCNC: 108 MG/DL (ref 70–99)
HCT VFR BLD AUTO: 37 %
HGB BLD-MCNC: 12.8 G/DL
IMM GRANULOCYTES # BLD AUTO: 0.01 X10(3) UL (ref 0–1)
IMM GRANULOCYTES NFR BLD: 0.3 %
LDH SERPL L TO P-CCNC: 221 U/L
LYMPHOCYTES # BLD AUTO: 0.32 X10(3) UL (ref 1–4)
LYMPHOCYTES NFR BLD AUTO: 9.6 %
MCH RBC QN AUTO: 28.5 PG (ref 26–34)
MCHC RBC AUTO-ENTMCNC: 34.6 G/DL (ref 31–37)
MCV RBC AUTO: 82.4 FL
MONOCYTES # BLD AUTO: 0.3 X10(3) UL (ref 0.1–1)
MONOCYTES NFR BLD AUTO: 9 %
NEUTROPHILS # BLD AUTO: 2.72 X10 (3) UL (ref 1.5–7.7)
NEUTROPHILS # BLD AUTO: 2.72 X10(3) UL (ref 1.5–7.7)
NEUTROPHILS NFR BLD AUTO: 81.1 %
OSMOLALITY SERPL CALC.SUM OF ELEC: 302 MOSM/KG (ref 275–295)
PLATELET # BLD AUTO: 163 10(3)UL (ref 150–450)
POTASSIUM SERPL-SCNC: 3.8 MMOL/L (ref 3.5–5.1)
PROT SERPL-MCNC: 5.9 G/DL (ref 6.4–8.2)
RBC # BLD AUTO: 4.49 X10(6)UL
SODIUM SERPL-SCNC: 143 MMOL/L (ref 136–145)
WBC # BLD AUTO: 3.4 X10(3) UL (ref 4–11)

## 2021-12-24 PROCEDURE — 99232 SBSQ HOSP IP/OBS MODERATE 35: CPT | Performed by: INTERNAL MEDICINE

## 2021-12-24 NOTE — PROGRESS NOTES
[facility]                INFECTIOUS DISEASE PROGRESS NOTE    Luca Pérez Patient Status:  Inpatient    1980 MRN [id number]   [facility] 5NW-A Attending Emily Barahona DO   Hosp Day # 2 PCP MD Alyson Metz No Growth 2 Days N/A             Problem list reviewed:  Patient Active Problem List:     Essential hypertension     KERRI (obstructive sleep apnea)     Mild intermittent asthma without complication     Chronic leukopenia     Renal insufficiency     Sickle c

## 2021-12-24 NOTE — PLAN OF CARE
Problem: Patient/Family Goals  Goal: Patient/Family Long Term Goal  Description: Patient's Long Term Goal:   Discharge to home     Interventions:  - Follow plan of care  - See additional Care Plan goals for specific interventions  Outcome: Grady Cruz

## 2021-12-24 NOTE — PROGRESS NOTES
[facility]     Hospitalist Progress Note     Stella Nevarez Patient Status:  Inpatient    1980 MRN [id number]   [facility] 5NW-A Attending Laurel Hooker DO   Hosp Day # 2 PCP Lashonda Narayan MD     Chief Complaint: Dyspnea 12/22/21  0210 12/23/21  0800 12/24/21  0727   CRP 15.10* 9.65* 5.92*   NASIR 652.6* 773.4* 673.4*    186 221   DDIMER 1.22* 1.21* 1.02*       Imaging: Imaging data reviewed in Epic.     Medications:   • amLODIPine  7.5 mg Oral Daily   • guaiFENesin ER

## 2021-12-24 NOTE — CM/SW NOTE
SW spoke w/pt in regards to COVID test. Pt stated he got his test at a drive thru place in [facility], but he doesn't remember what it's called.  Pt stated he will look it up and inform SW

## 2021-12-24 NOTE — PLAN OF CARE
Problem: Patient/Family Goals  Goal: Patient/Family Long Term Goal  Description: Patient's Long Term Goal:   Discharge to home     Interventions:  - Follow plan of care  - See additional Care Plan goals for specific interventions  Outcome: Gertrudis Uribe

## 2021-12-24 NOTE — PROGRESS NOTES
COVID-19 Daily Discharge Readiness-Nursing    O2 Sat at Rest:  SPO2% on Room Air at Rest: 94  %   O2 Sat with Exertion: SPO2% Ambulation on Oxygen: 98  % on Ambulation oxygen flow (liters per minute): 6  liters   Temperature max from last 24 hrs: Temp (24h

## 2021-12-24 NOTE — PROGRESS NOTES
12/24/21 1713   Mobility   O2 walk?  Yes   SPO2% on Room Air at Rest 94   SPO2% Ambulation on Oxygen 98   Ambulation oxygen flow (liters per minute) 6

## 2021-12-24 NOTE — PROGRESS NOTES
COVID-19 Daily Discharge Readiness-Nursing    O2 Sat at Rest:  SPO2% on Room Air at Rest: 86  %   O2 Sat with Exertion: SPO2% Ambulation on Oxygen: 91  % on Ambulation oxygen flow (liters per minute): 10  liters   Temperature max from last 24 hrs: Temp (24

## 2021-12-24 NOTE — PROGRESS NOTES
Priyanka Handy [employer]et Number  [phone]  Message From  [facility]  Patient First Name  Mariah Fragoso  Patient Last Name  Robbie Adeola  Reason PHI  Pt. is complaining of left chest pain especially when he coughs.  He said this is so

## 2021-12-25 LAB
ALBUMIN SERPL-MCNC: 2.4 G/DL (ref 3.4–5)
ALBUMIN/GLOB SERPL: 0.7 {RATIO} (ref 1–2)
ALP LIVER SERPL-CCNC: 38 U/L
ALT SERPL-CCNC: 44 U/L
ANION GAP SERPL CALC-SCNC: 8 MMOL/L (ref 0–18)
AST SERPL-CCNC: 28 U/L (ref 15–37)
BASOPHILS # BLD AUTO: 0 X10(3) UL (ref 0–0.2)
BASOPHILS NFR BLD AUTO: 0 %
BILIRUB SERPL-MCNC: 0.3 MG/DL (ref 0.1–2)
BUN BLD-MCNC: 23 MG/DL (ref 7–18)
CALCIUM BLD-MCNC: 8 MG/DL (ref 8.5–10.1)
CHLORIDE SERPL-SCNC: 108 MMOL/L (ref 98–112)
CO2 SERPL-SCNC: 25 MMOL/L (ref 21–32)
CREAT BLD-MCNC: 1.41 MG/DL
CRP SERPL-MCNC: 3.39 MG/DL (ref ?–0.3)
D DIMER PPP FEU-MCNC: 1.32 UG/ML FEU (ref ?–0.5)
DEPRECATED HBV CORE AB SER IA-ACNC: 621 NG/ML
EOSINOPHIL # BLD AUTO: 0.01 X10(3) UL (ref 0–0.7)
EOSINOPHIL NFR BLD AUTO: 0.2 %
ERYTHROCYTE [DISTWIDTH] IN BLOOD BY AUTOMATED COUNT: 12 %
GLOBULIN PLAS-MCNC: 3.6 G/DL (ref 2.8–4.4)
GLUCOSE BLD-MCNC: 100 MG/DL (ref 70–99)
HCT VFR BLD AUTO: 38.2 %
HGB BLD-MCNC: 13.2 G/DL
IMM GRANULOCYTES # BLD AUTO: 0.02 X10(3) UL (ref 0–1)
IMM GRANULOCYTES NFR BLD: 0.5 %
LDH SERPL L TO P-CCNC: 169 U/L
LYMPHOCYTES # BLD AUTO: 0.38 X10(3) UL (ref 1–4)
LYMPHOCYTES NFR BLD AUTO: 9.1 %
MCH RBC QN AUTO: 28.8 PG (ref 26–34)
MCHC RBC AUTO-ENTMCNC: 34.6 G/DL (ref 31–37)
MCV RBC AUTO: 83.2 FL
MONOCYTES # BLD AUTO: 0.37 X10(3) UL (ref 0.1–1)
MONOCYTES NFR BLD AUTO: 8.8 %
NEUTROPHILS # BLD AUTO: 3.41 X10 (3) UL (ref 1.5–7.7)
NEUTROPHILS # BLD AUTO: 3.41 X10(3) UL (ref 1.5–7.7)
NEUTROPHILS NFR BLD AUTO: 81.4 %
OSMOLALITY SERPL CALC.SUM OF ELEC: 296 MOSM/KG (ref 275–295)
PLATELET # BLD AUTO: 180 10(3)UL (ref 150–450)
POTASSIUM SERPL-SCNC: 4.2 MMOL/L (ref 3.5–5.1)
PROT SERPL-MCNC: 6 G/DL (ref 6.4–8.2)
RBC # BLD AUTO: 4.59 X10(6)UL
SODIUM SERPL-SCNC: 141 MMOL/L (ref 136–145)
WBC # BLD AUTO: 4.2 X10(3) UL (ref 4–11)

## 2021-12-25 PROCEDURE — 99232 SBSQ HOSP IP/OBS MODERATE 35: CPT | Performed by: HOSPITALIST

## 2021-12-25 NOTE — PLAN OF CARE
Problem: Patient/Family Goals  Goal: Patient/Family Long Term Goal  Description: Patient's Long Term Goal:   Discharge to home     Interventions:  - Follow plan of care  - See additional Care Plan goals for specific interventions  12/25/2021 0429 by [facility]

## 2021-12-25 NOTE — COVID NURSING ASSESSMENT
COVID-19 Daily Discharge Readiness-Nursing    O2 Sat at Rest:  >90% on room air, required 5L nasal cannula while sitting up right at rest and talking on phone  Temperature max from last 24 hrs: Temp (24hrs), Av.2 °F (36.8 °C), Min:97.7 °F (36.5 °C), Ma

## 2021-12-25 NOTE — RESPIRATORY THERAPY NOTE
Patient has history of KERRI and wears CPAP at home but is COVID positive so he cannot wear a CPAP machine at this time.

## 2021-12-25 NOTE — PROGRESS NOTES
[facility]     Hospitalist Progress Note     [facility] Patient Status:  Inpatient    1980 MRN [id number]   [facility] 5NW-A Attending Braden Cross, DO   Hosp Day # 3 PCP Marcello Moon MD     Chief Complaint: Dyspnea 3.39*   NASIR 773.4* 673.4* 621.0*    221 169   DDIMER 1.21* 1.02* 1.32*       Imaging: Imaging data reviewed in Epic.     Medications:   • amLODIPine  7.5 mg Oral Daily   • guaiFENesin ER  600 mg Oral BID   • Heparin Sodium (Porcine)  5,000 Units Subc

## 2021-12-25 NOTE — COVID NURSING ASSESSMENT
COVID-19 Daily Discharge Readiness-Nursing    O2 Sat at Rest:  SPO2% on Room Air at Rest: 94  %   O2 Sat with Exertion: SPO2% Ambulation on Oxygen: 93  % on Ambulation oxygen flow (liters per minute): 8  liters   Temperature max from last 24 hrs: Temp (24h

## 2021-12-26 LAB
ALBUMIN SERPL-MCNC: 2.4 G/DL (ref 3.4–5)
ALBUMIN/GLOB SERPL: 0.7 {RATIO} (ref 1–2)
ALP LIVER SERPL-CCNC: 35 U/L
ALT SERPL-CCNC: 40 U/L
ANION GAP SERPL CALC-SCNC: 8 MMOL/L (ref 0–18)
AST SERPL-CCNC: 24 U/L (ref 15–37)
BILIRUB SERPL-MCNC: 0.3 MG/DL (ref 0.1–2)
BUN BLD-MCNC: 21 MG/DL (ref 7–18)
CALCIUM BLD-MCNC: 7.9 MG/DL (ref 8.5–10.1)
CHLORIDE SERPL-SCNC: 109 MMOL/L (ref 98–112)
CO2 SERPL-SCNC: 23 MMOL/L (ref 21–32)
CREAT BLD-MCNC: 1.4 MG/DL
GLOBULIN PLAS-MCNC: 3.4 G/DL (ref 2.8–4.4)
GLUCOSE BLD-MCNC: 92 MG/DL (ref 70–99)
OSMOLALITY SERPL CALC.SUM OF ELEC: 293 MOSM/KG (ref 275–295)
POTASSIUM SERPL-SCNC: 4.1 MMOL/L (ref 3.5–5.1)
PROT SERPL-MCNC: 5.8 G/DL (ref 6.4–8.2)
SODIUM SERPL-SCNC: 140 MMOL/L (ref 136–145)

## 2021-12-26 PROCEDURE — 99232 SBSQ HOSP IP/OBS MODERATE 35: CPT | Performed by: HOSPITALIST

## 2021-12-26 NOTE — PROGRESS NOTES
[facility]                INFECTIOUS DISEASE PROGRESS NOTE    [facility] Patient Status:  Inpatient    1980 MRN [id number]   [facility] 5NW-A Attending Braden Cross, DO   Hosp Day # 4 PCP MD Armida Blanc Culture Result No Growth 4 Days N/A             Problem list reviewed:  Patient Active Problem List:     Essential hypertension     KERRI (obstructive sleep apnea)     Mild intermittent asthma without complication     Chronic leukopenia     Renal insufficien

## 2021-12-26 NOTE — PLAN OF CARE
Problem: Patient/Family Goals  Goal: Patient/Family Long Term Goal  Description: Patient's Long Term Goal:   Discharge to home     Interventions:  - Follow plan of care  - See additional Care Plan goals for specific interventions  Outcome: Jossue George

## 2021-12-26 NOTE — PLAN OF CARE
Problem: Patient/Family Goals  Goal: Patient/Family Long Term Goal  Description: Patient's Long Term Goal:   Discharge to home     Interventions:  - Follow plan of care  - See additional Care Plan goals for specific interventions  Outcome: Terrence Cazares

## 2021-12-26 NOTE — PROGRESS NOTES
[facility]     Hospitalist Progress Note     Dee Dee Vogel Patient Status:  Inpatient    1980 MRN [id number]   [facility] 5NW-A Attending Purnima Harrison, DO   Hosp Day # 4 PCP Heidi Alonso MD     Chief Complaint: Dyspnea 3.39*   NASIR 773.4* 673.4* 621.0*    221 169   DDIMER 1.21* 1.02* 1.32*       Imaging: Imaging data reviewed in Epic.     Medications:   • amLODIPine  7.5 mg Oral Daily   • guaiFENesin ER  600 mg Oral BID   • Heparin Sodium (Porcine)  5,000 Units Subc

## 2021-12-26 NOTE — COVID NURSING ASSESSMENT
COVID-19 Daily Discharge Readiness-Nursing    O2 Sat at Rest:  SPO2% on Room Air at Rest: 94  %   O2 Sat with Exertion: SPO2% Ambulation on Oxygen: 93  % on Ambulation oxygen flow (liters per minute): 8  liters   Temperature max from last 24 hrs: Temp (2

## 2021-12-27 VITALS
BODY MASS INDEX: 32 KG/M2 | WEIGHT: 265.88 LBS | TEMPERATURE: 98 F | HEART RATE: 79 BPM | SYSTOLIC BLOOD PRESSURE: 117 MMHG | RESPIRATION RATE: 17 BRPM | OXYGEN SATURATION: 90 % | DIASTOLIC BLOOD PRESSURE: 77 MMHG

## 2021-12-27 LAB
ALBUMIN SERPL-MCNC: 2.4 G/DL (ref 3.4–5)
ALBUMIN/GLOB SERPL: 0.6 {RATIO} (ref 1–2)
ALP LIVER SERPL-CCNC: 39 U/L
ALT SERPL-CCNC: 44 U/L
ANION GAP SERPL CALC-SCNC: 6 MMOL/L (ref 0–18)
AST SERPL-CCNC: 23 U/L (ref 15–37)
BILIRUB SERPL-MCNC: 0.3 MG/DL (ref 0.1–2)
BUN BLD-MCNC: 25 MG/DL (ref 7–18)
CALCIUM BLD-MCNC: 8.1 MG/DL (ref 8.5–10.1)
CHLORIDE SERPL-SCNC: 109 MMOL/L (ref 98–112)
CO2 SERPL-SCNC: 24 MMOL/L (ref 21–32)
CREAT BLD-MCNC: 1.47 MG/DL
GLOBULIN PLAS-MCNC: 3.9 G/DL (ref 2.8–4.4)
GLUCOSE BLD-MCNC: 109 MG/DL (ref 70–99)
OSMOLALITY SERPL CALC.SUM OF ELEC: 293 MOSM/KG (ref 275–295)
POTASSIUM SERPL-SCNC: 4.2 MMOL/L (ref 3.5–5.1)
PROT SERPL-MCNC: 6.3 G/DL (ref 6.4–8.2)
SODIUM SERPL-SCNC: 139 MMOL/L (ref 136–145)

## 2021-12-27 PROCEDURE — 99239 HOSP IP/OBS DSCHRG MGMT >30: CPT | Performed by: HOSPITALIST

## 2021-12-27 RX ORDER — DEXAMETHASONE 6 MG/1
6 TABLET ORAL DAILY
Qty: 4 TABLET | Refills: 0 | Status: SHIPPED | OUTPATIENT
Start: 2021-12-28 | End: 2022-01-01

## 2021-12-27 NOTE — PLAN OF CARE
Problem: Patient/Family Goals  Goal: Patient/Family Long Term Goal  Description: Patient's Long Term Goal:   Discharge to home     Interventions:  - Follow plan of care  - See additional Care Plan goals for specific interventions  Outcome: Noé Dillard

## 2021-12-27 NOTE — PLAN OF CARE
Problem: Patient/Family Goals  Goal: Patient/Family Short Term Goal  Description: Patient's Short Term Goal:   12/22 noc: reduce 02 needs  12/22 NOC: wean O2 as able  12/23 NOC: sleep  12/24(noc): wean off oxygen, use IS / prone  12/25 am: wean O2, use I

## 2021-12-27 NOTE — PROGRESS NOTES
12/27/21 1301   Mobility   O2 walk?  Yes   SPO2% on Room Air at Rest 94   SPO2% Ambulation on Room Air 84   SPO2% Ambulation on Oxygen 90   Ambulation oxygen flow (liters per minute) 4

## 2021-12-27 NOTE — PROGRESS NOTES
Multidisciplinary Discharge Rounds held 12/26/2021. Treatment team members present today include , , Charge Nurse, Nurse, RT, PT and Pharmacy caring for deeplocal.        Mobility Goal: ambulating      Active issue(s) req

## 2021-12-27 NOTE — PROGRESS NOTES
COVID-19 Daily Discharge Readiness-Nursing    O2 Sat at Rest:  SPO2% on Room Air at Rest: 94  %   O2 Sat with Exertion: SPO2% Ambulation on Oxygen: 90  % on Ambulation oxygen flow (liters per minute): 4  liters   Temperature max from last 24 hrs: Temp (24h

## 2021-12-27 NOTE — CM/SW NOTE
Referral for home o2 sent to [facility] via aidin, await approval.    Call to kade at [facility], will give insurance auth for fletcher.      Kaylen Boyce RN,   [id number]

## 2021-12-27 NOTE — CM/SW NOTE
12/27/21 1505   Discharge disposition   Expected discharge disposition Home or [facility] Provider Home   DME/Infusion Providers Other (comment)  [facility])   Discharge transportation Private car     Sadiq has approved home o2.  Portable tank

## 2021-12-27 NOTE — PLAN OF CARE
Problem: Patient/Family Goals  Goal: Patient/Family Long Term Goal  Description: Patient's Long Term Goal:   Discharge to home     Interventions:  - Follow plan of care  - See additional Care Plan goals for specific interventions  12/27/2021 1505 by Lani Davidson growth factors as ordered  - Implement neutropenic guidelines  12/27/2021 1505 by Amilcar Molina RN  Outcome: Adequate for Discharge  12/27/2021 0750 by Amilcar Molina RN  Outcome: Progressing

## 2021-12-28 ENCOUNTER — PATIENT OUTREACH (OUTPATIENT)
Dept: CASE MANAGEMENT | Age: 41
End: 2021-12-28

## 2021-12-28 NOTE — PROGRESS NOTES
Initial Post Discharge Follow Up   Discharge Date: 12/27/21  Contact Date: 12/28/2021    Consent Verification:  Assessment Completed With: Patient  HIPAA Verified?   Yes    Discharge Dx:     #Acute hypoxic respiratory failure due to Covid pneumonia    Wa Irbesartan-hydroCHLOROthiazide 300-12.5 MG Oral Tab Take 1 tablet by mouth daily.  90 tablet 1   • ALBUTEROL SULFATE  (90 Base) MCG/ACT Inhalation Aero Soln INHALE 2 PUFFS INTO THE LUNGS EVERY 6 HOURS AS NEEDED FOR WHEEZING 8.5 g 0   • triamcinolone CST Video Visit with [facility] ([id number])    Please verify your telehealth insurance benefits prior to your appointment. You must be in the state of [place] during the virtual visit.      Ple during their entire visit, only to be removed if asked to do so by your provider. We are working to limit the number of people in our waiting rooms and ask that patients do not bring anyone with them to their appointment unless clinically required. For patients with TCC appointments:     NCM offered sooner TCC appointment if schedule allowed:  no    [x]  Advised patient to bring all medications and blood glucose meter/supplies if applicable.

## 2021-12-28 NOTE — DISCHARGE SUMMARY
[facility] HOSPITALIST  DISCHARGE SUMMARY     Ruffin Gaucher Patient Status:  Inpatient    1980 MRN [id number]   [facility] 5NW-A Attending No att. providers found   Hosp Day # 5 PCP Ren Correia MD     Date of Admission: 2021 for home O2. Complete 10 day course of decadron.      Procedures during hospitalization:   • none    Incidental or significant findings and recommendations (brief descriptions):  • none    Lab/Test results pending at Discharge:   · none    Consultants:  • I [id number]  [phone]    In 1 week        Vital signs:  Temp:  [97.5 °F (36.4 °C)-98.3 °F (36.8 °C)] 97.6 °F (36.4 °C)  Pulse:  [75-94] 79  Resp:  [17-20] 17  BP: (111-138)/(66-86) 117/77    Physical Exam:    General: No acute distress.    Respiratory: Clear

## 2021-12-29 ENCOUNTER — TELEMEDICINE (OUTPATIENT)
Dept: INTERNAL MEDICINE CLINIC | Facility: CLINIC | Age: 41
End: 2021-12-29

## 2021-12-29 DIAGNOSIS — U07.1 PNEUMONIA DUE TO COVID-19 VIRUS: Primary | ICD-10-CM

## 2021-12-29 DIAGNOSIS — Z99.81 SUPPLEMENTAL OXYGEN DEPENDENT: ICD-10-CM

## 2021-12-29 DIAGNOSIS — R09.02 HYPOXIA: ICD-10-CM

## 2021-12-29 DIAGNOSIS — J12.82 PNEUMONIA DUE TO COVID-19 VIRUS: Primary | ICD-10-CM

## 2021-12-29 PROCEDURE — 99495 TRANSJ CARE MGMT MOD F2F 14D: CPT | Performed by: NURSE PRACTITIONER

## 2021-12-29 NOTE — PROGRESS NOTES
TRANSITIONAL CARE CLINIC PHARMACIST MEDICATION RECONCILIATION        Luca Pérez MRN [id number]    1980 PCP Beni Toth MD       Comments: Medication history completed by the [facility] Pharmacist with the patient using two-w Patient confirmed understanding.      Thank you,    Marian Salcedo, PharmD, 12/29/2021, 12:32 PM  [facility]

## 2021-12-29 NOTE — PROGRESS NOTES
Video Visit  [address]      Please note that the following visit was completed using two-way, real-time interactive audio and video communication.   This has been done in good ed[place] to provide continuity of care in the best with exertion, denies N/V/D. He denies body aches, head aches, or sore throat, has significant fatigue, and reports no loss of taste and smell. His appetite is good and is drinking well.  He was encouraged to start on Vitamin C and Vitamin D, and Zinc daily Tobacco Use      Smoking status: Former Smoker        Types: Cigarettes        Quit date: 12/13/2011        Years since quitting: 10.0      Smokeless tobacco: Never Used    Vaping Use      Vaping Use: Never used    Alcohol use:  Yes      Alcohol/week: 3.0 s 09/05/1978, 09/05/1978, 11/14/1978, 11/14/1978, 01/16/1979, 01/16/1979, 01/29/1980, 01/29/1980, 05/19/1993, 05/19/1993   • TD 06/16/1992   • TDAP 09/05/1978, 11/14/1978, 01/16/1979, 01/29/1980, 05/19/1983, 10/26/2007   • Tb Intradermal Test 07/12/1979, 09/ Hypoxia/Supplemental oxygen dependent  · Still having hypoxia with exertion on RA  · Continue 02 at 2-3 L NC with exertion and during sleep  · Monitor oxygen levels frequently-go to ED if oxygen drops to 89% or below and does not recover  · Monitor for pro providers and services. Medication Reconciliation:  I am aware of an inpatient discharge within the last 30 days. The discharge medication list has been reconciled with the patient's current medication list and reviewed by me.   See medication list for

## 2022-01-03 PROBLEM — Z99.81 SUPPLEMENTAL OXYGEN DEPENDENT: Status: ACTIVE | Noted: 2022-01-03

## 2022-01-04 ENCOUNTER — TELEPHONE (OUTPATIENT)
Dept: INTERNAL MEDICINE CLINIC | Facility: CLINIC | Age: 42
End: 2022-01-04

## 2022-01-04 ENCOUNTER — TELEMEDICINE (OUTPATIENT)
Dept: INTERNAL MEDICINE CLINIC | Facility: CLINIC | Age: 42
End: 2022-01-04
Payer: COMMERCIAL

## 2022-01-04 DIAGNOSIS — U07.1 PNEUMONIA DUE TO COVID-19 VIRUS: Primary | ICD-10-CM

## 2022-01-04 DIAGNOSIS — R09.02 HYPOXIA: ICD-10-CM

## 2022-01-04 DIAGNOSIS — M10.9 ACUTE GOUT INVOLVING TOE OF LEFT FOOT, UNSPECIFIED CAUSE: ICD-10-CM

## 2022-01-04 DIAGNOSIS — J12.82 PNEUMONIA DUE TO COVID-19 VIRUS: Primary | ICD-10-CM

## 2022-01-04 PROCEDURE — 99214 OFFICE O/P EST MOD 30 MIN: CPT | Performed by: FAMILY MEDICINE

## 2022-01-04 RX ORDER — INDOMETHACIN 50 MG/1
50 CAPSULE ORAL
Qty: 15 CAPSULE | Refills: 0 | Status: SHIPPED | OUTPATIENT
Start: 2022-01-04 | End: 2022-01-09

## 2022-01-04 NOTE — PROGRESS NOTES
Due to COVID-19 ACTION PLAN, the patient's office visit was converted to a video visit with informed patient consent.    Time Spent: 30 min    Subjective     HPI:   Dawit Lutz is a 35year old male who presents for evaluation of pain, swelling of the with Indocin in the past. Begin 5 day course of Indomecin and will follow-up at upcoming visit. - indomethacin 50 MG Oral Cap; Take 1 capsule (50 mg total) by mouth 3 (three) times daily with meals for 5 days. Dispense: 15 capsule;  Refill: 0          Mi

## 2022-01-04 NOTE — TELEPHONE ENCOUNTER
Pt calling with gout flare up in his left big toe. Its red, inflamed and painful. He is asking if there is anything that can be prescribed for him? Symptoms began yesterday.  Please advise

## 2022-01-04 NOTE — TELEPHONE ENCOUNTER
Patient states his gout symptoms started yesterday, swelling of left great toe, flexing is painful, last gout flare was 2 months ago. COVID positive 12/17/21, COVID pneumonia on 12/22/21 and hospitalized.   Pt states he still has fatigue and shortness of b

## 2022-01-07 ENCOUNTER — TELEPHONE (OUTPATIENT)
Dept: INTERNAL MEDICINE CLINIC | Facility: CLINIC | Age: 42
End: 2022-01-07

## 2022-01-07 NOTE — TELEPHONE ENCOUNTER
Patient was recently hospitalized for covid and is still experiencing fatigue and SOB which has improved since discharge. Patient is wondering if a Sauna treatment would help with his SOB. Please advise.

## 2022-01-07 NOTE — TELEPHONE ENCOUNTER
covid positive 12/17/2022  Onset of symptoms 12/15/2021    Patient scheduled 1/24/2022 with [facility], sooner appointments offered- he declined due to work schedule. Patient notified to call with any changes, he has seen improvement of symptoms. Advised to call with any changes. Advised patient to avoid suana at this time until feeling better. Patient did complete HFU with TCM. His friend was telling him the sauna may help him- so he wanted to confirm if ok or not.  ARUN BTUTERFIELD.

## 2022-01-24 ENCOUNTER — LAB ENCOUNTER (OUTPATIENT)
Dept: LAB | Age: 42
End: 2022-01-24
Attending: NURSE PRACTITIONER
Payer: COMMERCIAL

## 2022-01-24 ENCOUNTER — OFFICE VISIT (OUTPATIENT)
Dept: INTERNAL MEDICINE CLINIC | Facility: CLINIC | Age: 42
End: 2022-01-24
Payer: COMMERCIAL

## 2022-01-24 VITALS
DIASTOLIC BLOOD PRESSURE: 88 MMHG | SYSTOLIC BLOOD PRESSURE: 138 MMHG | HEART RATE: 105 BPM | HEIGHT: 76 IN | OXYGEN SATURATION: 97 % | WEIGHT: 276 LBS | RESPIRATION RATE: 18 BRPM | BODY MASS INDEX: 33.61 KG/M2

## 2022-01-24 DIAGNOSIS — I10 ESSENTIAL HYPERTENSION: ICD-10-CM

## 2022-01-24 DIAGNOSIS — N17.9 AKI (ACUTE KIDNEY INJURY) (HCC): ICD-10-CM

## 2022-01-24 DIAGNOSIS — D72.819 CHRONIC LEUKOPENIA: ICD-10-CM

## 2022-01-24 DIAGNOSIS — J12.82 PNEUMONIA DUE TO COVID-19 VIRUS: Primary | ICD-10-CM

## 2022-01-24 DIAGNOSIS — R09.02 HYPOXIA: ICD-10-CM

## 2022-01-24 DIAGNOSIS — J45.20 MILD INTERMITTENT ASTHMA WITHOUT COMPLICATION: ICD-10-CM

## 2022-01-24 DIAGNOSIS — G47.33 OSA (OBSTRUCTIVE SLEEP APNEA): ICD-10-CM

## 2022-01-24 DIAGNOSIS — Z99.81 SUPPLEMENTAL OXYGEN DEPENDENT: ICD-10-CM

## 2022-01-24 DIAGNOSIS — I10 PRIMARY HYPERTENSION: ICD-10-CM

## 2022-01-24 DIAGNOSIS — U07.1 PNEUMONIA DUE TO COVID-19 VIRUS: Primary | ICD-10-CM

## 2022-01-24 DIAGNOSIS — U07.1 PNEUMONIA DUE TO COVID-19 VIRUS: ICD-10-CM

## 2022-01-24 DIAGNOSIS — N18.2 STAGE 2 CHRONIC KIDNEY DISEASE: ICD-10-CM

## 2022-01-24 DIAGNOSIS — J12.82 PNEUMONIA DUE TO COVID-19 VIRUS: ICD-10-CM

## 2022-01-24 DIAGNOSIS — R73.9 HYPERGLYCEMIA: ICD-10-CM

## 2022-01-24 LAB
ALBUMIN SERPL-MCNC: 3.3 G/DL (ref 3.4–5)
ALBUMIN/GLOB SERPL: 0.9 {RATIO} (ref 1–2)
ALP LIVER SERPL-CCNC: 62 U/L
ALT SERPL-CCNC: 36 U/L
ANION GAP SERPL CALC-SCNC: 7 MMOL/L (ref 0–18)
AST SERPL-CCNC: 19 U/L (ref 15–37)
BASOPHILS # BLD AUTO: 0.04 X10(3) UL (ref 0–0.2)
BASOPHILS NFR BLD AUTO: 0.9 %
BILIRUB SERPL-MCNC: 0.5 MG/DL (ref 0.1–2)
BUN BLD-MCNC: 12 MG/DL (ref 7–18)
CALCIUM BLD-MCNC: 9 MG/DL (ref 8.5–10.1)
CHLORIDE SERPL-SCNC: 106 MMOL/L (ref 98–112)
CO2 SERPL-SCNC: 29 MMOL/L (ref 21–32)
CREAT BLD-MCNC: 1.41 MG/DL
EOSINOPHIL # BLD AUTO: 0.12 X10(3) UL (ref 0–0.7)
EOSINOPHIL NFR BLD AUTO: 2.6 %
ERYTHROCYTE [DISTWIDTH] IN BLOOD BY AUTOMATED COUNT: 12.5 %
FASTING STATUS PATIENT QL REPORTED: YES
GLOBULIN PLAS-MCNC: 3.5 G/DL (ref 2.8–4.4)
GLUCOSE BLD-MCNC: 100 MG/DL (ref 70–99)
HCT VFR BLD AUTO: 39.6 %
HGB BLD-MCNC: 13.2 G/DL
IMM GRANULOCYTES # BLD AUTO: 0.06 X10(3) UL (ref 0–1)
IMM GRANULOCYTES NFR BLD: 1.3 %
LYMPHOCYTES # BLD AUTO: 1 X10(3) UL (ref 1–4)
LYMPHOCYTES NFR BLD AUTO: 21.5 %
MCH RBC QN AUTO: 27.6 PG (ref 26–34)
MCHC RBC AUTO-ENTMCNC: 33.3 G/DL (ref 31–37)
MCV RBC AUTO: 82.7 FL
MONOCYTES # BLD AUTO: 0.62 X10(3) UL (ref 0.1–1)
MONOCYTES NFR BLD AUTO: 13.3 %
NEUTROPHILS # BLD AUTO: 2.82 X10 (3) UL (ref 1.5–7.7)
NEUTROPHILS # BLD AUTO: 2.82 X10(3) UL (ref 1.5–7.7)
NEUTROPHILS NFR BLD AUTO: 60.4 %
OSMOLALITY SERPL CALC.SUM OF ELEC: 294 MOSM/KG (ref 275–295)
PLATELET # BLD AUTO: 255 10(3)UL (ref 150–450)
POTASSIUM SERPL-SCNC: 3.8 MMOL/L (ref 3.5–5.1)
PROT SERPL-MCNC: 6.8 G/DL (ref 6.4–8.2)
RBC # BLD AUTO: 4.79 X10(6)UL
SODIUM SERPL-SCNC: 142 MMOL/L (ref 136–145)
WBC # BLD AUTO: 4.7 X10(3) UL (ref 4–11)

## 2022-01-24 PROCEDURE — 3075F SYST BP GE 130 - 139MM HG: CPT | Performed by: NURSE PRACTITIONER

## 2022-01-24 PROCEDURE — 3008F BODY MASS INDEX DOCD: CPT | Performed by: NURSE PRACTITIONER

## 2022-01-24 PROCEDURE — 99214 OFFICE O/P EST MOD 30 MIN: CPT | Performed by: NURSE PRACTITIONER

## 2022-01-24 PROCEDURE — 3079F DIAST BP 80-89 MM HG: CPT | Performed by: NURSE PRACTITIONER

## 2022-01-24 PROCEDURE — 36415 COLL VENOUS BLD VENIPUNCTURE: CPT

## 2022-01-24 PROCEDURE — 80053 COMPREHEN METABOLIC PANEL: CPT

## 2022-01-24 PROCEDURE — 85025 COMPLETE CBC W/AUTO DIFF WBC: CPT

## 2022-01-24 RX ORDER — SPIRONOLACTONE 25 MG/1
25 TABLET ORAL DAILY
Qty: 90 TABLET | Refills: 1 | Status: SHIPPED
Start: 2022-01-24

## 2022-01-24 RX ORDER — AMLODIPINE BESYLATE 5 MG/1
7.5 TABLET ORAL DAILY
Qty: 135 TABLET | Refills: 1 | Status: SHIPPED
Start: 2022-01-24

## 2022-01-24 RX ORDER — IRBESARTAN AND HYDROCHLOROTHIAZIDE 300; 12.5 MG/1; MG/1
1 TABLET, FILM COATED ORAL DAILY
Qty: 90 TABLET | Refills: 1 | Status: SHIPPED
Start: 2022-01-24

## 2022-01-24 NOTE — PROGRESS NOTES
Nieves Dasilva is a 35year old male. Patient presents with:  Hospital F/U: MR rm 10 hospital f/up from COVID       HPI:   Here for hospital follow up   TCC note reviewed. televisit with [address] reviewed. Gout. Admitted via ER with SOB.  covid positive Pneumonia due to COVID-19 virus     Hypoxia     Primary hypertension     LINUS (acute kidney injury) ([facility] [address]     Stage 2 chronic kidney disease     Supplemental oxygen dependent    Current Outpatient Medications   Medication Sig Dispense Refill   • spironolact auscultation  No wheezing   CARDIO: RRR without murmur  GI: normal bowel sounds, no masses, HSM or tenderness  EXTREMITIES: no edema, normal strength and tone  PSYCH: alert and oriented x 3    ASSESSMENT AND PLAN:     Pneumonia due to covid-19 virus  (prim

## 2022-02-02 ENCOUNTER — OFFICE VISIT (OUTPATIENT)
Dept: SLEEP CENTER | Age: 42
End: 2022-02-02
Attending: NURSE PRACTITIONER
Payer: COMMERCIAL

## 2022-02-02 DIAGNOSIS — R06.83 SNORING: ICD-10-CM

## 2022-02-02 DIAGNOSIS — G47.10 HYPERSOMNIA: ICD-10-CM

## 2022-02-02 PROCEDURE — 95810 POLYSOM 6/> YRS 4/> PARAM: CPT

## 2022-03-25 RX ORDER — IRBESARTAN AND HYDROCHLOROTHIAZIDE 300; 12.5 MG/1; MG/1
1 TABLET, FILM COATED ORAL DAILY
Qty: 90 TABLET | Refills: 1 | Status: SHIPPED | OUTPATIENT
Start: 2022-03-25

## 2022-04-19 RX ORDER — SPIRONOLACTONE 25 MG/1
25 TABLET ORAL DAILY
Qty: 90 TABLET | Refills: 1 | OUTPATIENT
Start: 2022-04-19

## 2022-04-19 NOTE — TELEPHONE ENCOUNTER
Latest RX: spironolactone- 90 tabs 1 refills on 1/24/22    Per protocol, passed. Rx denied. Refill sent already.

## 2022-04-25 ENCOUNTER — LAB ENCOUNTER (OUTPATIENT)
Dept: LAB | Facility: HOSPITAL | Age: 42
End: 2022-04-25
Attending: INTERNAL MEDICINE
Payer: COMMERCIAL

## 2022-04-25 DIAGNOSIS — Z01.818 PREOP EXAMINATION: ICD-10-CM

## 2022-04-25 DIAGNOSIS — Z11.59 SCREENING FOR VIRAL DISEASE: ICD-10-CM

## 2022-04-26 LAB — SARS-COV-2 RNA RESP QL NAA+PROBE: NOT DETECTED

## 2022-04-27 ENCOUNTER — OFFICE VISIT (OUTPATIENT)
Dept: SLEEP CENTER | Age: 42
End: 2022-04-27
Attending: INTERNAL MEDICINE
Payer: COMMERCIAL

## 2022-04-27 DIAGNOSIS — G47.33 OSA (OBSTRUCTIVE SLEEP APNEA): ICD-10-CM

## 2022-04-27 PROCEDURE — 95811 POLYSOM 6/>YRS CPAP 4/> PARM: CPT

## 2022-05-09 RX ORDER — IRBESARTAN AND HYDROCHLOROTHIAZIDE 300; 12.5 MG/1; MG/1
1 TABLET, FILM COATED ORAL DAILY
Qty: 90 TABLET | Refills: 0 | Status: SHIPPED | OUTPATIENT
Start: 2022-05-09

## 2022-05-09 RX ORDER — SPIRONOLACTONE 25 MG/1
25 TABLET ORAL DAILY
Qty: 90 TABLET | Refills: 0 | Status: SHIPPED | OUTPATIENT
Start: 2022-05-09

## 2022-05-12 ENCOUNTER — LAB ENCOUNTER (OUTPATIENT)
Dept: LAB | Facility: HOSPITAL | Age: 42
End: 2022-05-12
Attending: NURSE PRACTITIONER
Payer: COMMERCIAL

## 2022-05-12 DIAGNOSIS — Z11.59 SCREENING FOR VIRAL DISEASE: ICD-10-CM

## 2022-05-12 DIAGNOSIS — Z01.818 PREOP EXAMINATION: ICD-10-CM

## 2022-05-13 ENCOUNTER — HOSPITAL ENCOUNTER (OUTPATIENT)
Dept: GENERAL RADIOLOGY | Age: 42
Discharge: HOME | End: 2022-05-13
Attending: INTERNAL MEDICINE
Payer: COMMERCIAL

## 2022-05-13 DIAGNOSIS — U07.1 PNEUMONIA DUE TO COVID-19 VIRUS: ICD-10-CM

## 2022-05-13 DIAGNOSIS — J12.82 PNEUMONIA DUE TO COVID-19 VIRUS: ICD-10-CM

## 2022-05-13 LAB — SARS-COV-2 RNA RESP QL NAA+PROBE: NOT DETECTED

## 2022-05-13 PROCEDURE — 71046 X-RAY EXAM CHEST 2 VIEWS: CPT | Performed by: INTERNAL MEDICINE

## 2022-05-16 ENCOUNTER — TELEPHONE (OUTPATIENT)
Dept: INTERNAL MEDICINE CLINIC | Facility: CLINIC | Age: 42
End: 2022-05-16

## 2022-05-25 RX ORDER — ALBUTEROL SULFATE 90 UG/1
AEROSOL, METERED RESPIRATORY (INHALATION)
Qty: 8.5 G | Refills: 0 | Status: SHIPPED | OUTPATIENT
Start: 2022-05-25

## 2022-05-27 ENCOUNTER — LAB ENCOUNTER (OUTPATIENT)
Dept: LAB | Facility: HOSPITAL | Age: 42
End: 2022-05-27
Attending: NURSE PRACTITIONER
Payer: COMMERCIAL

## 2022-05-27 DIAGNOSIS — Z13.220 SCREENING FOR LIPID DISORDERS: ICD-10-CM

## 2022-05-27 DIAGNOSIS — R73.9 HYPERGLYCEMIA: ICD-10-CM

## 2022-05-27 DIAGNOSIS — Z13.228 SCREENING FOR METABOLIC DISORDER: ICD-10-CM

## 2022-05-27 DIAGNOSIS — Z13.29 SCREENING FOR THYROID DISORDER: ICD-10-CM

## 2022-05-27 DIAGNOSIS — Z13.0 SCREENING FOR DISORDER OF BLOOD AND BLOOD-FORMING ORGANS: ICD-10-CM

## 2022-05-27 DIAGNOSIS — Z00.00 ROUTINE GENERAL MEDICAL EXAMINATION AT A HEALTH CARE FACILITY: ICD-10-CM

## 2022-05-27 DIAGNOSIS — N18.2 STAGE 2 CHRONIC KIDNEY DISEASE: ICD-10-CM

## 2022-05-27 LAB
ALBUMIN SERPL-MCNC: 3.7 G/DL (ref 3.4–5)
ALBUMIN/GLOB SERPL: 1.1 {RATIO} (ref 1–2)
ALP LIVER SERPL-CCNC: 42 U/L
ALT SERPL-CCNC: 38 U/L
ANION GAP SERPL CALC-SCNC: 7 MMOL/L (ref 0–18)
AST SERPL-CCNC: 23 U/L (ref 15–37)
BASOPHILS # BLD AUTO: 0.02 X10(3) UL (ref 0–0.2)
BASOPHILS NFR BLD AUTO: 0.6 %
BILIRUB SERPL-MCNC: 1.2 MG/DL (ref 0.1–2)
BUN BLD-MCNC: 14 MG/DL (ref 7–18)
CALCIUM BLD-MCNC: 8.5 MG/DL (ref 8.5–10.1)
CHLORIDE SERPL-SCNC: 106 MMOL/L (ref 98–112)
CHOLEST SERPL-MCNC: 148 MG/DL (ref ?–200)
CO2 SERPL-SCNC: 27 MMOL/L (ref 21–32)
CREAT BLD-MCNC: 1.43 MG/DL
EOSINOPHIL # BLD AUTO: 0.04 X10(3) UL (ref 0–0.7)
EOSINOPHIL NFR BLD AUTO: 1.3 %
ERYTHROCYTE [DISTWIDTH] IN BLOOD BY AUTOMATED COUNT: 12.2 %
EST. AVERAGE GLUCOSE BLD GHB EST-MCNC: 97 MG/DL (ref 68–126)
FASTING PATIENT LIPID ANSWER: YES
FASTING STATUS PATIENT QL REPORTED: YES
GLOBULIN PLAS-MCNC: 3.4 G/DL (ref 2.8–4.4)
GLUCOSE BLD-MCNC: 105 MG/DL (ref 70–99)
HBA1C MFR BLD: 5 % (ref ?–5.7)
HCT VFR BLD AUTO: 41.7 %
HDLC SERPL-MCNC: 65 MG/DL (ref 40–59)
HGB BLD-MCNC: 14.3 G/DL
IMM GRANULOCYTES # BLD AUTO: 0.01 X10(3) UL (ref 0–1)
IMM GRANULOCYTES NFR BLD: 0.3 %
LDLC SERPL CALC-MCNC: 67 MG/DL (ref ?–100)
LYMPHOCYTES # BLD AUTO: 0.65 X10(3) UL (ref 1–4)
LYMPHOCYTES NFR BLD AUTO: 20.9 %
MCH RBC QN AUTO: 28.7 PG (ref 26–34)
MCHC RBC AUTO-ENTMCNC: 34.3 G/DL (ref 31–37)
MCV RBC AUTO: 83.7 FL
MONOCYTES # BLD AUTO: 0.3 X10(3) UL (ref 0.1–1)
MONOCYTES NFR BLD AUTO: 9.6 %
NEUTROPHILS # BLD AUTO: 2.09 X10 (3) UL (ref 1.5–7.7)
NEUTROPHILS # BLD AUTO: 2.09 X10(3) UL (ref 1.5–7.7)
NEUTROPHILS NFR BLD AUTO: 67.3 %
NONHDLC SERPL-MCNC: 83 MG/DL (ref ?–130)
OSMOLALITY SERPL CALC.SUM OF ELEC: 291 MOSM/KG (ref 275–295)
PLATELET # BLD AUTO: 159 10(3)UL (ref 150–450)
POTASSIUM SERPL-SCNC: 3.6 MMOL/L (ref 3.5–5.1)
PROT SERPL-MCNC: 7.1 G/DL (ref 6.4–8.2)
RBC # BLD AUTO: 4.98 X10(6)UL
SODIUM SERPL-SCNC: 140 MMOL/L (ref 136–145)
TRIGL SERPL-MCNC: 87 MG/DL (ref 30–149)
TSI SER-ACNC: 0.71 MIU/ML (ref 0.36–3.74)
VLDLC SERPL CALC-MCNC: 13 MG/DL (ref 0–30)
WBC # BLD AUTO: 3.1 X10(3) UL (ref 4–11)

## 2022-05-27 PROCEDURE — 85025 COMPLETE CBC W/AUTO DIFF WBC: CPT

## 2022-05-27 PROCEDURE — 36415 COLL VENOUS BLD VENIPUNCTURE: CPT

## 2022-05-27 PROCEDURE — 80053 COMPREHEN METABOLIC PANEL: CPT

## 2022-05-27 PROCEDURE — 84443 ASSAY THYROID STIM HORMONE: CPT

## 2022-05-27 PROCEDURE — 83036 HEMOGLOBIN GLYCOSYLATED A1C: CPT

## 2022-05-27 PROCEDURE — 80061 LIPID PANEL: CPT

## 2022-05-31 PROBLEM — Z87.01 HISTORY OF VIRAL PNEUMONIA: Status: ACTIVE | Noted: 2021-12-22

## 2022-06-09 ENCOUNTER — APPOINTMENT (OUTPATIENT)
Dept: URGENT CARE | Age: 42
End: 2022-06-09
Attending: FAMILY MEDICINE

## 2022-06-17 ENCOUNTER — OFFICE VISIT (OUTPATIENT)
Dept: INTERNAL MEDICINE CLINIC | Facility: CLINIC | Age: 42
End: 2022-06-17
Payer: COMMERCIAL

## 2022-06-17 ENCOUNTER — LAB ENCOUNTER (OUTPATIENT)
Dept: LAB | Age: 42
End: 2022-06-17
Attending: NURSE PRACTITIONER
Payer: COMMERCIAL

## 2022-06-17 VITALS
DIASTOLIC BLOOD PRESSURE: 90 MMHG | SYSTOLIC BLOOD PRESSURE: 152 MMHG | HEIGHT: 75.25 IN | TEMPERATURE: 98 F | BODY MASS INDEX: 34.07 KG/M2 | OXYGEN SATURATION: 98 % | HEART RATE: 80 BPM | WEIGHT: 274 LBS

## 2022-06-17 DIAGNOSIS — D72.810 LYMPHOPENIA: ICD-10-CM

## 2022-06-17 DIAGNOSIS — Z12.5 SCREENING FOR PROSTATE CANCER: ICD-10-CM

## 2022-06-17 DIAGNOSIS — Z80.0 FAMILY HISTORY OF COLON CANCER: ICD-10-CM

## 2022-06-17 DIAGNOSIS — Z11.3 SCREEN FOR STD (SEXUALLY TRANSMITTED DISEASE): ICD-10-CM

## 2022-06-17 DIAGNOSIS — D72.819 CHRONIC LEUKOPENIA: ICD-10-CM

## 2022-06-17 DIAGNOSIS — Z12.11 SCREEN FOR COLON CANCER: ICD-10-CM

## 2022-06-17 DIAGNOSIS — N18.2 STAGE 2 CHRONIC KIDNEY DISEASE: ICD-10-CM

## 2022-06-17 DIAGNOSIS — Z87.01 HISTORY OF VIRAL PNEUMONIA: ICD-10-CM

## 2022-06-17 DIAGNOSIS — D70.9 CHRONIC NEUTROPENIA (HCC): ICD-10-CM

## 2022-06-17 DIAGNOSIS — G47.33 OSA (OBSTRUCTIVE SLEEP APNEA): ICD-10-CM

## 2022-06-17 DIAGNOSIS — I10 PRIMARY HYPERTENSION: ICD-10-CM

## 2022-06-17 DIAGNOSIS — D57.3 SICKLE CELL TRAIT (HCC): ICD-10-CM

## 2022-06-17 DIAGNOSIS — J45.20 MILD INTERMITTENT ASTHMA WITHOUT COMPLICATION: Primary | ICD-10-CM

## 2022-06-17 PROBLEM — N28.9 RENAL INSUFFICIENCY: Status: RESOLVED | Noted: 2021-06-07 | Resolved: 2022-06-17

## 2022-06-17 PROBLEM — Z99.81 SUPPLEMENTAL OXYGEN DEPENDENT: Status: RESOLVED | Noted: 2022-01-03 | Resolved: 2022-06-17

## 2022-06-17 LAB
COMPLEXED PSA SERPL-MCNC: 0.94 NG/ML (ref ?–4)
HBV SURFACE AB SER QL: REACTIVE
HBV SURFACE AB SERPL IA-ACNC: 23.32 MIU/ML
HBV SURFACE AG SER-ACNC: <0.1 [IU]/L
HBV SURFACE AG SERPL QL IA: NONREACTIVE
HCV AB SERPL QL IA: NONREACTIVE
T PALLIDUM AB SER QL IA: NONREACTIVE

## 2022-06-17 PROCEDURE — 87591 N.GONORRHOEAE DNA AMP PROB: CPT

## 2022-06-17 PROCEDURE — 87491 CHLMYD TRACH DNA AMP PROBE: CPT

## 2022-06-17 PROCEDURE — 86706 HEP B SURFACE ANTIBODY: CPT

## 2022-06-17 PROCEDURE — 86780 TREPONEMA PALLIDUM: CPT

## 2022-06-17 PROCEDURE — 86803 HEPATITIS C AB TEST: CPT

## 2022-06-17 PROCEDURE — 87389 HIV-1 AG W/HIV-1&-2 AB AG IA: CPT

## 2022-06-17 PROCEDURE — 87340 HEPATITIS B SURFACE AG IA: CPT

## 2022-06-17 PROCEDURE — 36415 COLL VENOUS BLD VENIPUNCTURE: CPT

## 2022-06-17 RX ORDER — AMLODIPINE BESYLATE 5 MG/1
5 TABLET ORAL DAILY
Qty: 90 TABLET | Refills: 1 | Status: SHIPPED | OUTPATIENT
Start: 2022-06-17

## 2022-06-17 RX ORDER — IRBESARTAN 300 MG/1
300 TABLET ORAL NIGHTLY
Qty: 90 TABLET | Refills: 1 | Status: SHIPPED | OUTPATIENT
Start: 2022-06-17

## 2022-06-17 NOTE — PROGRESS NOTES
ASTHMA CONTROL TEST (ACT):    1. In the past 4 weeks, how much of the time did your asthma keep you from getting as much done at work, school or at home? All the time (1)  Most of the time (2)  Some of the time (3)  A little of the time (4)  None of the time (5)       5    2. During the past 4 weeks, how often have you had shortness of breath? More than once a day (1)  Once a day (2)  3-6 times a week (3)  Once or twice a week (4)  Not at all (5)        5    3. During the past 4 weeks, how often did your asthma symptoms (wheezing, coughing, SOB, chest tightness or pain) wake you up at night or earlier than usual in the morning?    4 or more night per week (1)  2-3 nights per week (2)  Once a week (3)  Once or twice (4)  Not at all (5)        5    4. During the past 4 weeks, how often have you used your rescue inhaler or nebulizer medication? 3 or more times per day (1)  1-2 times per day (2)  2 or 3 times per week (3)  Once a week or less (4)  Not at all (5)        5    5. How would you rate your asthma control in the last 4 weeks?     Not controlled (1)  Poorly controlled (2)  Somewhat controlled (3)  Well controlled (4)  Completely controlled (5)      5      TOTAL ACT SCORE:      25

## 2022-06-20 LAB
C TRACH DNA SPEC QL NAA+PROBE: NEGATIVE
N GONORRHOEA DNA SPEC QL NAA+PROBE: NEGATIVE

## 2022-06-29 ENCOUNTER — TELEPHONE (OUTPATIENT)
Dept: INTERNAL MEDICINE CLINIC | Facility: CLINIC | Age: 42
End: 2022-06-29

## 2022-06-29 NOTE — TELEPHONE ENCOUNTER
Patient will  paperwork and cd's from [facility], scheduled appt with [facility] for Friday 7/1/22 at 11am 40 minutes. Pt verbalizes understanding.

## 2022-06-29 NOTE — TELEPHONE ENCOUNTER
Patients girlfriend Gino Israel calling to schedule ER fu.  Pt started with headache at the back of head that got progressively worse. Patients coworkers said he wasn't making sense and then patient lost conscience and fell off his chair and hit shoulder. Pt was unresponsive until ambulance arrived. Patient was having difficulty communicating and making sense. Taken to [address] [place] [address],[address], had normal labs, normal CT of head and xray of shoulder that noted a leison or fx. Patient is in a lot of pain and needs appt for ER fu and referrals. Please call pt.

## 2022-07-01 ENCOUNTER — TELEPHONE (OUTPATIENT)
Dept: INTERNAL MEDICINE CLINIC | Facility: CLINIC | Age: 42
End: 2022-07-01

## 2022-07-01 ENCOUNTER — OFFICE VISIT (OUTPATIENT)
Dept: INTERNAL MEDICINE CLINIC | Facility: CLINIC | Age: 42
End: 2022-07-01
Payer: COMMERCIAL

## 2022-07-01 VITALS
SYSTOLIC BLOOD PRESSURE: 134 MMHG | HEIGHT: 75.59 IN | BODY MASS INDEX: 33 KG/M2 | WEIGHT: 268.19 LBS | OXYGEN SATURATION: 96 % | TEMPERATURE: 98 F | DIASTOLIC BLOOD PRESSURE: 86 MMHG | RESPIRATION RATE: 18 BRPM | HEART RATE: 89 BPM

## 2022-07-01 DIAGNOSIS — R56.9 SEIZURE-LIKE ACTIVITY (HCC): ICD-10-CM

## 2022-07-01 DIAGNOSIS — N18.9 CHRONIC KIDNEY DISEASE, UNSPECIFIED CKD STAGE: ICD-10-CM

## 2022-07-01 DIAGNOSIS — M89.9 LESION OF HUMERUS PRESENT ON X-RAY: ICD-10-CM

## 2022-07-01 DIAGNOSIS — E87.6 HYPOKALEMIA: ICD-10-CM

## 2022-07-01 DIAGNOSIS — R94.31 ABNORMAL EKG: ICD-10-CM

## 2022-07-01 DIAGNOSIS — M25.512 ACUTE PAIN OF LEFT SHOULDER: ICD-10-CM

## 2022-07-01 DIAGNOSIS — R55 SYNCOPE, UNSPECIFIED SYNCOPE TYPE: Primary | ICD-10-CM

## 2022-07-01 DIAGNOSIS — I10 PRIMARY HYPERTENSION: ICD-10-CM

## 2022-07-01 DIAGNOSIS — S49.92XA INJURY OF LEFT SHOULDER, INITIAL ENCOUNTER: ICD-10-CM

## 2022-07-01 PROCEDURE — 3008F BODY MASS INDEX DOCD: CPT | Performed by: FAMILY MEDICINE

## 2022-07-01 PROCEDURE — 3075F SYST BP GE 130 - 139MM HG: CPT | Performed by: FAMILY MEDICINE

## 2022-07-01 PROCEDURE — 3079F DIAST BP 80-89 MM HG: CPT | Performed by: FAMILY MEDICINE

## 2022-07-01 PROCEDURE — 99214 OFFICE O/P EST MOD 30 MIN: CPT | Performed by: FAMILY MEDICINE

## 2022-07-01 NOTE — TELEPHONE ENCOUNTER
Received copy of ER report from [address] [place] [address].   Sent ER report and EKG to scanning

## 2022-07-06 ENCOUNTER — TELEPHONE (OUTPATIENT)
Dept: INTERNAL MEDICINE CLINIC | Facility: CLINIC | Age: 42
End: 2022-07-06

## 2022-07-06 NOTE — TELEPHONE ENCOUNTER
Pt stated his work is requiring a letter stating he's ok to go back to work. Pt would like letter today or tomorrow. Pt stated he would like a call back when it's ready and is ok to send through [employer].

## 2022-07-06 NOTE — TELEPHONE ENCOUNTER
LOV 7/1/22 with [address] Pt is looking for a letter to go back to work. Pt states he has not had any episodes or s/s since the day he went to the ER. Patient is scheduled for Stress Echo on 7/18/22, MRI Shoulder on 7/7/22. Pt notified he will need to call to schedule the Echocardiogram  And hopefully can schedule for the same day as the Stress Echo. Appt with Dr Antoinette Bhandari Neurologist scheduled for 9/14/22.

## 2022-07-07 ENCOUNTER — HOSPITAL ENCOUNTER (OUTPATIENT)
Dept: MRI IMAGING | Facility: HOSPITAL | Age: 42
End: 2022-07-07
Attending: FAMILY MEDICINE
Payer: COMMERCIAL

## 2022-07-07 ENCOUNTER — TELEPHONE (OUTPATIENT)
Dept: INTERNAL MEDICINE CLINIC | Facility: CLINIC | Age: 42
End: 2022-07-07

## 2022-07-07 ENCOUNTER — HOSPITAL ENCOUNTER (OUTPATIENT)
Dept: MRI IMAGING | Age: 42
Discharge: HOME | End: 2022-07-07
Attending: FAMILY MEDICINE
Payer: COMMERCIAL

## 2022-07-07 DIAGNOSIS — S43.80XA REVERSE BANKART LESION OF SHOULDER: ICD-10-CM

## 2022-07-07 DIAGNOSIS — M25.512 ACUTE PAIN OF LEFT SHOULDER: ICD-10-CM

## 2022-07-07 DIAGNOSIS — S49.92XA INJURY OF LEFT SHOULDER, INITIAL ENCOUNTER: ICD-10-CM

## 2022-07-07 DIAGNOSIS — S43.022S: ICD-10-CM

## 2022-07-07 DIAGNOSIS — M89.9 LESION OF HUMERUS PRESENT ON X-RAY: ICD-10-CM

## 2022-07-07 DIAGNOSIS — S49.92XA INJURY OF LEFT SHOULDER, INITIAL ENCOUNTER: Primary | ICD-10-CM

## 2022-07-07 DIAGNOSIS — S42.293A REVERSE HILL-SACHS LESION OF HUMERUS: ICD-10-CM

## 2022-07-07 PROCEDURE — 73223 MRI JOINT UPR EXTR W/O&W/DYE: CPT | Performed by: FAMILY MEDICINE

## 2022-07-07 PROCEDURE — A9575 INJ GADOTERATE MEGLUMI 0.1ML: HCPCS | Performed by: FAMILY MEDICINE

## 2022-07-07 NOTE — TELEPHONE ENCOUNTER
Nathalie Mireles for work note to return to work. He needs to be seen by neurology sooner than 2 months from now.

## 2022-07-08 ENCOUNTER — TELEPHONE (OUTPATIENT)
Dept: INTERNAL MEDICINE CLINIC | Facility: CLINIC | Age: 42
End: 2022-07-08

## 2022-07-08 NOTE — TELEPHONE ENCOUNTER
Pt seen by [facility] 7/1/22 for ER f/u. Pt had syncopal episode/seizure-like activity. Pt referred to neuro. Pt tried to get appt, but could not get in until sept. Per [facility], pt needs to be seen ideally within next 2 weeks. Called and spoke to [facility] neuro. Explained the situation. This RN was informed that all providers are booked out until Sept, some even Nov. Asked for a message to be sent to their clinical team to attempt to get pt in sooner.  said that she would send message to the nurses and they will call either us or pt. FYI to [facility].

## 2022-07-11 NOTE — TELEPHONE ENCOUNTER
Attempted to call [facility] neuro to check on status of note being sent to clinical/pt appt. Waited for phone to be answered for 20+ mins with no answer.  Will attempt to call again tomorrow am.

## 2022-07-12 ENCOUNTER — TELEPHONE (OUTPATIENT)
Dept: SURGERY | Facility: CLINIC | Age: 42
End: 2022-07-12

## 2022-07-12 NOTE — TELEPHONE ENCOUNTER
Linda't placed on hold for 8/17 at 10am until 7/14 4pm.   Dr Blanca Hernandez nurse notified Kehinde Ornelas ). MLTCB on VM (ok per HIPAA consent) by end of day 7/14 to confirm appointment.

## 2022-07-12 NOTE — TELEPHONE ENCOUNTER
Spoke to [place] at [facility] neuro to check on status. [place] looked through pt's chart and did not see a note was ever sent to clinical staff. Asked for note to be sent because pt needs to be seen. Ana guan said that she will send note, but their schedule is very full. If the specialist believe pt can wait, then they will need to wait. Requested note be sent to clinical staff for them to determine this. Ana guan said that she will send note. Can see that was note was sent via pt's chart. ARUN to [facility].

## 2022-07-12 NOTE — TELEPHONE ENCOUNTER
Mera RN from [facility] called back. She informed this RN that they have a cancellation on 8/17. This is the soonest they would be able to get pt in at this time. She said that she will also put him on a wait list, as they frequently get cancellations. She will call pt to inform him, but wanted us to be aware. ARUN to [facility].

## 2022-07-12 NOTE — TELEPHONE ENCOUNTER
Pt's PCP calling requesting earlier appt date. Please advise psr if sooner appt available/necessary.

## 2022-07-15 ENCOUNTER — OFFICE VISIT (OUTPATIENT)
Dept: ORTHOPEDICS CLINIC | Facility: CLINIC | Age: 42
End: 2022-07-15
Payer: COMMERCIAL

## 2022-07-15 VITALS — WEIGHT: 268 LBS | HEIGHT: 76 IN | BODY MASS INDEX: 32.63 KG/M2

## 2022-07-15 DIAGNOSIS — S42.293A REVERSE HILL-SACHS LESION OF HUMERUS: ICD-10-CM

## 2022-07-15 DIAGNOSIS — S43.022A POSTERIOR DISLOCATION OF LEFT SHOULDER JOINT, INITIAL ENCOUNTER: Primary | ICD-10-CM

## 2022-07-15 DIAGNOSIS — S43.432A TEAR OF LEFT GLENOID LABRUM, INITIAL ENCOUNTER: ICD-10-CM

## 2022-07-15 PROCEDURE — 23600 CLTX PROX HUMRL FX W/O MNPJ: CPT | Performed by: ORTHOPAEDIC SURGERY

## 2022-07-15 PROCEDURE — 99204 OFFICE O/P NEW MOD 45 MIN: CPT | Performed by: ORTHOPAEDIC SURGERY

## 2022-07-15 PROCEDURE — 3008F BODY MASS INDEX DOCD: CPT | Performed by: ORTHOPAEDIC SURGERY

## 2022-07-27 ENCOUNTER — HOSPITAL ENCOUNTER (OUTPATIENT)
Dept: CV DIAGNOSTICS | Facility: HOSPITAL | Age: 42
Discharge: HOME | End: 2022-07-27
Attending: FAMILY MEDICINE
Payer: COMMERCIAL

## 2022-07-27 DIAGNOSIS — R55 SYNCOPE, UNSPECIFIED SYNCOPE TYPE: ICD-10-CM

## 2022-07-27 PROCEDURE — 93306 TTE W/DOPPLER COMPLETE: CPT | Performed by: FAMILY MEDICINE

## 2022-08-11 ENCOUNTER — OFFICE VISIT (OUTPATIENT)
Dept: INTERNAL MEDICINE CLINIC | Facility: CLINIC | Age: 42
End: 2022-08-11
Payer: COMMERCIAL

## 2022-08-11 ENCOUNTER — TELEPHONE (OUTPATIENT)
Dept: INTERNAL MEDICINE CLINIC | Facility: CLINIC | Age: 42
End: 2022-08-11

## 2022-08-11 ENCOUNTER — HOSPITAL ENCOUNTER (OUTPATIENT)
Dept: GENERAL RADIOLOGY | Age: 42
Discharge: HOME | End: 2022-08-11
Attending: FAMILY MEDICINE
Payer: COMMERCIAL

## 2022-08-11 VITALS
DIASTOLIC BLOOD PRESSURE: 88 MMHG | HEIGHT: 75.59 IN | BODY MASS INDEX: 32.98 KG/M2 | RESPIRATION RATE: 18 BRPM | WEIGHT: 268 LBS | SYSTOLIC BLOOD PRESSURE: 138 MMHG | HEART RATE: 72 BPM

## 2022-08-11 DIAGNOSIS — I10 PRIMARY HYPERTENSION: ICD-10-CM

## 2022-08-11 DIAGNOSIS — M79.671 ACUTE PAIN OF RIGHT FOOT: Primary | ICD-10-CM

## 2022-08-11 DIAGNOSIS — M10.9 ACUTE GOUT OF RIGHT FOOT, UNSPECIFIED CAUSE: ICD-10-CM

## 2022-08-11 DIAGNOSIS — M79.671 ACUTE PAIN OF RIGHT FOOT: ICD-10-CM

## 2022-08-11 PROCEDURE — 3008F BODY MASS INDEX DOCD: CPT | Performed by: FAMILY MEDICINE

## 2022-08-11 PROCEDURE — 73630 X-RAY EXAM OF FOOT: CPT | Performed by: FAMILY MEDICINE

## 2022-08-11 PROCEDURE — 3079F DIAST BP 80-89 MM HG: CPT | Performed by: FAMILY MEDICINE

## 2022-08-11 PROCEDURE — 99214 OFFICE O/P EST MOD 30 MIN: CPT | Performed by: FAMILY MEDICINE

## 2022-08-11 PROCEDURE — 3075F SYST BP GE 130 - 139MM HG: CPT | Performed by: FAMILY MEDICINE

## 2022-08-11 RX ORDER — INDOMETHACIN 50 MG/1
50 CAPSULE ORAL
Qty: 21 CAPSULE | Refills: 0 | Status: SHIPPED | OUTPATIENT
Start: 2022-08-11 | End: 2022-08-18

## 2022-08-11 NOTE — TELEPHONE ENCOUNTER
FYI    LOV 7/1/22    Last indomethacin RX 1/4/22. Pt reports pain to right dorsal foot nearing ankle since yesterday. Has mild swelling. Denies injury, redness, warmth, or bruising. No recent extended travel or SOB. More painful upon bearing weight, minimal upon rest. Scheduled per below. Hx gout. Not on preventative.      Future Appointments   Date Time Provider Krystal Mckeoni   8/11/2022 10:40 AM Ta Ortega MD [facility]

## 2022-08-17 ENCOUNTER — OFFICE VISIT (OUTPATIENT)
Dept: NEUROLOGY | Facility: CLINIC | Age: 42
End: 2022-08-17
Payer: COMMERCIAL

## 2022-08-17 ENCOUNTER — TELEPHONE (OUTPATIENT)
Dept: PHYSICAL THERAPY | Facility: HOSPITAL | Age: 42
End: 2022-08-17

## 2022-08-17 VITALS
BODY MASS INDEX: 32.98 KG/M2 | OXYGEN SATURATION: 98 % | RESPIRATION RATE: 18 BRPM | DIASTOLIC BLOOD PRESSURE: 82 MMHG | SYSTOLIC BLOOD PRESSURE: 138 MMHG | HEART RATE: 87 BPM | HEIGHT: 75.59 IN | WEIGHT: 268 LBS

## 2022-08-17 DIAGNOSIS — R55 SYNCOPE, UNSPECIFIED SYNCOPE TYPE: Primary | ICD-10-CM

## 2022-08-17 PROCEDURE — 3008F BODY MASS INDEX DOCD: CPT | Performed by: OTHER

## 2022-08-17 PROCEDURE — 3079F DIAST BP 80-89 MM HG: CPT | Performed by: OTHER

## 2022-08-17 PROCEDURE — 3075F SYST BP GE 130 - 139MM HG: CPT | Performed by: OTHER

## 2022-08-17 PROCEDURE — 99244 OFF/OP CNSLTJ NEW/EST MOD 40: CPT | Performed by: OTHER

## 2022-08-17 RX ORDER — DIVALPROEX SODIUM 500 MG/1
500 TABLET, DELAYED RELEASE ORAL NIGHTLY
Qty: 30 TABLET | Refills: 1 | Status: SHIPPED | OUTPATIENT
Start: 2022-08-17 | End: 2022-08-17

## 2022-08-17 RX ORDER — GABAPENTIN 300 MG/1
300 CAPSULE ORAL NIGHTLY
Qty: 30 CAPSULE | Refills: 2 | Status: SHIPPED | OUTPATIENT
Start: 2022-08-17

## 2022-08-19 ENCOUNTER — TELEPHONE (OUTPATIENT)
Dept: PHYSICAL THERAPY | Facility: HOSPITAL | Age: 42
End: 2022-08-19

## 2022-08-19 ENCOUNTER — OFFICE VISIT (OUTPATIENT)
Dept: PHYSICAL THERAPY | Facility: HOSPITAL | Age: 42
End: 2022-08-19
Attending: ORTHOPAEDIC SURGERY
Payer: COMMERCIAL

## 2022-08-19 DIAGNOSIS — S42.293A REVERSE HILL-SACHS LESION OF HUMERUS: ICD-10-CM

## 2022-08-19 DIAGNOSIS — S43.432A TEAR OF LEFT GLENOID LABRUM, INITIAL ENCOUNTER: ICD-10-CM

## 2022-08-19 DIAGNOSIS — S43.022A POSTERIOR DISLOCATION OF LEFT SHOULDER JOINT, INITIAL ENCOUNTER: ICD-10-CM

## 2022-08-19 PROCEDURE — 97162 PT EVAL MOD COMPLEX 30 MIN: CPT

## 2022-08-19 PROCEDURE — 97110 THERAPEUTIC EXERCISES: CPT

## 2022-08-22 ENCOUNTER — OFFICE VISIT (OUTPATIENT)
Dept: PHYSICAL THERAPY | Facility: HOSPITAL | Age: 42
End: 2022-08-22
Attending: ORTHOPAEDIC SURGERY
Payer: COMMERCIAL

## 2022-08-22 DIAGNOSIS — S43.022A POSTERIOR DISLOCATION OF LEFT SHOULDER JOINT, INITIAL ENCOUNTER: ICD-10-CM

## 2022-08-22 DIAGNOSIS — S42.293A REVERSE HILL-SACHS LESION OF HUMERUS: ICD-10-CM

## 2022-08-22 DIAGNOSIS — S43.432A TEAR OF LEFT GLENOID LABRUM, INITIAL ENCOUNTER: ICD-10-CM

## 2022-08-22 PROCEDURE — 97110 THERAPEUTIC EXERCISES: CPT

## 2022-08-26 ENCOUNTER — OFFICE VISIT (OUTPATIENT)
Dept: PHYSICAL THERAPY | Facility: HOSPITAL | Age: 42
End: 2022-08-26
Attending: ORTHOPAEDIC SURGERY
Payer: COMMERCIAL

## 2022-08-26 DIAGNOSIS — S42.293A REVERSE HILL-SACHS LESION OF HUMERUS: ICD-10-CM

## 2022-08-26 DIAGNOSIS — S43.432A TEAR OF LEFT GLENOID LABRUM, INITIAL ENCOUNTER: ICD-10-CM

## 2022-08-26 DIAGNOSIS — S43.022A POSTERIOR DISLOCATION OF LEFT SHOULDER JOINT, INITIAL ENCOUNTER: ICD-10-CM

## 2022-08-26 PROCEDURE — 97110 THERAPEUTIC EXERCISES: CPT

## 2022-08-30 ENCOUNTER — APPOINTMENT (OUTPATIENT)
Dept: PHYSICAL THERAPY | Facility: HOSPITAL | Age: 42
End: 2022-08-30
Attending: ORTHOPAEDIC SURGERY
Payer: COMMERCIAL

## 2022-08-31 ENCOUNTER — NURSE ONLY (OUTPATIENT)
Dept: ELECTROPHYSIOLOGY | Facility: HOSPITAL | Age: 42
End: 2022-08-31
Attending: Other
Payer: COMMERCIAL

## 2022-08-31 ENCOUNTER — HOSPITAL ENCOUNTER (OUTPATIENT)
Dept: MRI IMAGING | Facility: HOSPITAL | Age: 42
Discharge: HOME | End: 2022-08-31
Attending: Other
Payer: COMMERCIAL

## 2022-08-31 DIAGNOSIS — R55 SYNCOPE, UNSPECIFIED SYNCOPE TYPE: ICD-10-CM

## 2022-08-31 PROCEDURE — 70551 MRI BRAIN STEM W/O DYE: CPT | Performed by: OTHER

## 2022-08-31 PROCEDURE — 95816 EEG AWAKE AND DROWSY: CPT | Performed by: OTHER

## 2022-08-31 NOTE — PROCEDURES
Date of Procedure: 8/31/2022    Procedure: EEG (ELECTROENCEPHALOGRAM)     DX: SYNCOPE  HX: 37 Y/O MALE WHO ON 6/28/2 HAD EPISODE AT WORK WHILE SPEAKING TO CO-WORKER, HE PASSED OUT AND BODY \"LOCKED UP\" AND HE FELL OUT OF CHAIR. PT STATES CO-WORKER STATED LOOKED LIKE SZ ACTIVITY  WHEN EMT ARRIVED, PT GROGGY AND THEN GRADUALLY BACK TO BASELINE. PT HAS HAD NOT SIMILAR EPISODES SINCE BUT STATES HE HAS HAD A CHRONIC HEADAVHE EVERYDAY SINCE EPISODE. PT ALSO NOTED HE WAS WORKING WITH CHEMICALS THAT LEAKED AND HE THINKS EPISODE RELATED TO INHALING THE FUMES. PMH: ASHTMA, HTN, KERRI, SICKLE CELL TRAIT  PT STATE: A&OX3  AWAKE, DROWSY AND SLEEP  RX: GABAPENTIN, IRBESARTAN, AMLODIPINE, ALBUTEROL, SPIRONOLACTONE    BACKGROUND ACTIVITY: Posterior rhythm was in the range of 7-8 Hz, reactive to eye opening; symmetrical and synchronous. Noted also are generalized intermittent slowing. Drowsiness is characterized by intermittent theta waves bitemporally. EPILEPTIFORM DISCHARGES: There were no epileptiform discharges or periodic lateralized epileptiform discharges (PLEDs) recorded throughout the recording. HYPERVENTILATION: Hyperventilation was not performed. PHOTIC STIMULATION: Photic stimulation was performed with no change. Stage II sleep was not reached. IMPRESSION: Unremarkable EEG without any interictal discharges, electrographic seizure activity or epileptiform activity. However, this does not rule out seizure disorder. Clinical correlation is advised.     Bro Clements MD   Neurology  [facility]  8/31/2022, 9:14 AM  CC: Lavon Roque MD

## 2022-09-02 ENCOUNTER — APPOINTMENT (OUTPATIENT)
Dept: PHYSICAL THERAPY | Facility: HOSPITAL | Age: 42
End: 2022-09-02
Attending: ORTHOPAEDIC SURGERY
Payer: COMMERCIAL

## 2022-09-06 ENCOUNTER — APPOINTMENT (OUTPATIENT)
Dept: PHYSICAL THERAPY | Facility: HOSPITAL | Age: 42
End: 2022-09-06
Attending: ORTHOPAEDIC SURGERY
Payer: COMMERCIAL

## 2022-09-09 ENCOUNTER — OFFICE VISIT (OUTPATIENT)
Dept: INTERNAL MEDICINE CLINIC | Facility: CLINIC | Age: 42
End: 2022-09-09
Payer: COMMERCIAL

## 2022-09-09 ENCOUNTER — OFFICE VISIT (OUTPATIENT)
Dept: PHYSICAL THERAPY | Facility: HOSPITAL | Age: 42
End: 2022-09-09
Attending: ORTHOPAEDIC SURGERY
Payer: COMMERCIAL

## 2022-09-09 VITALS
HEART RATE: 86 BPM | HEIGHT: 75.59 IN | RESPIRATION RATE: 18 BRPM | BODY MASS INDEX: 32.61 KG/M2 | WEIGHT: 265 LBS | DIASTOLIC BLOOD PRESSURE: 86 MMHG | SYSTOLIC BLOOD PRESSURE: 136 MMHG

## 2022-09-09 DIAGNOSIS — R55 SYNCOPE, UNSPECIFIED SYNCOPE TYPE: Primary | ICD-10-CM

## 2022-09-09 DIAGNOSIS — I77.819 AORTIC ECTASIA (HCC): ICD-10-CM

## 2022-09-09 DIAGNOSIS — R51.9 NONINTRACTABLE HEADACHE, UNSPECIFIED CHRONICITY PATTERN, UNSPECIFIED HEADACHE TYPE: ICD-10-CM

## 2022-09-09 DIAGNOSIS — S43.022D POSTERIOR DISLOCATION OF LEFT SHOULDER JOINT, SUBSEQUENT ENCOUNTER: ICD-10-CM

## 2022-09-09 DIAGNOSIS — R56.9 SEIZURE-LIKE ACTIVITY (HCC): ICD-10-CM

## 2022-09-09 DIAGNOSIS — R94.31 ABNORMAL EKG: ICD-10-CM

## 2022-09-09 PROCEDURE — 97110 THERAPEUTIC EXERCISES: CPT

## 2022-09-09 PROCEDURE — 3008F BODY MASS INDEX DOCD: CPT | Performed by: FAMILY MEDICINE

## 2022-09-09 PROCEDURE — 3075F SYST BP GE 130 - 139MM HG: CPT | Performed by: FAMILY MEDICINE

## 2022-09-09 PROCEDURE — 99214 OFFICE O/P EST MOD 30 MIN: CPT | Performed by: FAMILY MEDICINE

## 2022-09-09 PROCEDURE — 3079F DIAST BP 80-89 MM HG: CPT | Performed by: FAMILY MEDICINE

## 2022-09-13 ENCOUNTER — OFFICE VISIT (OUTPATIENT)
Dept: PHYSICAL THERAPY | Facility: HOSPITAL | Age: 42
End: 2022-09-13
Attending: ORTHOPAEDIC SURGERY
Payer: COMMERCIAL

## 2022-09-13 PROCEDURE — 97110 THERAPEUTIC EXERCISES: CPT

## 2022-09-17 ENCOUNTER — LAB ENCOUNTER (OUTPATIENT)
Dept: LAB | Age: 42
End: 2022-09-17
Attending: FAMILY MEDICINE

## 2022-09-17 DIAGNOSIS — R94.31 ABNORMAL EKG: ICD-10-CM

## 2022-09-17 DIAGNOSIS — R55 SYNCOPE, UNSPECIFIED SYNCOPE TYPE: ICD-10-CM

## 2022-09-18 LAB — SARS-COV-2 RNA RESP QL NAA+PROBE: NOT DETECTED

## 2022-09-20 ENCOUNTER — TELEPHONE (OUTPATIENT)
Dept: ORTHOPEDICS CLINIC | Facility: CLINIC | Age: 42
End: 2022-09-20

## 2022-09-20 ENCOUNTER — ORDER TRANSCRIPTION (OUTPATIENT)
Dept: ADMINISTRATIVE | Facility: HOSPITAL | Age: 42
End: 2022-09-20

## 2022-09-20 DIAGNOSIS — Z01.812 PRE-PROCEDURE LAB EXAM: Primary | ICD-10-CM

## 2022-09-20 NOTE — TELEPHONE ENCOUNTER
Patient is requesting to know if he had any weight restrictions on his shoulder, pt last visit 7/15/22.

## 2022-09-27 ENCOUNTER — LAB ENCOUNTER (OUTPATIENT)
Dept: LAB | Facility: HOSPITAL | Age: 42
End: 2022-09-27
Attending: FAMILY MEDICINE
Payer: COMMERCIAL

## 2022-09-27 DIAGNOSIS — Z01.812 PRE-PROCEDURE LAB EXAM: ICD-10-CM

## 2022-09-27 LAB — SARS-COV-2 RNA RESP QL NAA+PROBE: NOT DETECTED

## 2022-09-30 ENCOUNTER — APPOINTMENT (OUTPATIENT)
Dept: PHYSICAL THERAPY | Facility: HOSPITAL | Age: 42
End: 2022-09-30
Attending: INTERNAL MEDICINE
Payer: COMMERCIAL

## 2022-09-30 ENCOUNTER — HOSPITAL ENCOUNTER (OUTPATIENT)
Dept: CV DIAGNOSTICS | Facility: HOSPITAL | Age: 42
Discharge: HOME | End: 2022-09-30
Attending: FAMILY MEDICINE
Payer: COMMERCIAL

## 2022-09-30 DIAGNOSIS — R55 SYNCOPE, UNSPECIFIED SYNCOPE TYPE: ICD-10-CM

## 2022-09-30 DIAGNOSIS — R94.31 ABNORMAL EKG: ICD-10-CM

## 2022-09-30 PROCEDURE — 93018 CV STRESS TEST I&R ONLY: CPT | Performed by: FAMILY MEDICINE

## 2022-09-30 PROCEDURE — 93017 CV STRESS TEST TRACING ONLY: CPT | Performed by: FAMILY MEDICINE

## 2022-09-30 PROCEDURE — 93350 STRESS TTE ONLY: CPT | Performed by: FAMILY MEDICINE

## 2022-10-12 ENCOUNTER — OFFICE VISIT (OUTPATIENT)
Dept: ORTHOPEDICS CLINIC | Facility: CLINIC | Age: 42
End: 2022-10-12
Payer: COMMERCIAL

## 2022-10-12 DIAGNOSIS — S43.432D TEAR OF LEFT GLENOID LABRUM, SUBSEQUENT ENCOUNTER: ICD-10-CM

## 2022-10-12 DIAGNOSIS — S42.293A REVERSE HILL-SACHS LESION OF HUMERUS: ICD-10-CM

## 2022-10-12 DIAGNOSIS — S43.022D POSTERIOR DISLOCATION OF LEFT SHOULDER JOINT, SUBSEQUENT ENCOUNTER: Primary | ICD-10-CM

## 2022-10-12 PROCEDURE — 99024 POSTOP FOLLOW-UP VISIT: CPT | Performed by: ORTHOPAEDIC SURGERY

## 2022-10-19 RX ORDER — ALBUTEROL SULFATE 90 UG/1
2 AEROSOL, METERED RESPIRATORY (INHALATION) EVERY 6 HOURS PRN
Qty: 18 G | Refills: 0 | Status: SHIPPED | OUTPATIENT
Start: 2022-10-19

## 2022-10-20 ENCOUNTER — TELEPHONE (OUTPATIENT)
Dept: NEUROLOGY | Facility: CLINIC | Age: 42
End: 2022-10-20

## 2022-10-20 NOTE — TELEPHONE ENCOUNTER
Rec'd medical records request from [facility], [place] for medical records from [facility] 6/28/22 to present. Original sent to scan/stat. Copy sent to scanning.

## 2022-10-26 ENCOUNTER — TELEPHONE (OUTPATIENT)
Dept: PHYSICAL THERAPY | Facility: HOSPITAL | Age: 42
End: 2022-10-26

## 2022-10-26 ENCOUNTER — APPOINTMENT (OUTPATIENT)
Dept: PHYSICAL THERAPY | Facility: HOSPITAL | Age: 42
End: 2022-10-26
Attending: ORTHOPAEDIC SURGERY

## 2022-11-02 ENCOUNTER — OFFICE VISIT (OUTPATIENT)
Dept: PHYSICAL THERAPY | Facility: HOSPITAL | Age: 42
End: 2022-11-02
Attending: ORTHOPAEDIC SURGERY
Payer: COMMERCIAL

## 2022-11-02 PROCEDURE — 97110 THERAPEUTIC EXERCISES: CPT

## 2022-11-09 ENCOUNTER — OFFICE VISIT (OUTPATIENT)
Dept: NEUROLOGY | Facility: CLINIC | Age: 42
End: 2022-11-09
Payer: COMMERCIAL

## 2022-11-09 VITALS
HEART RATE: 64 BPM | SYSTOLIC BLOOD PRESSURE: 142 MMHG | DIASTOLIC BLOOD PRESSURE: 80 MMHG | BODY MASS INDEX: 33 KG/M2 | RESPIRATION RATE: 16 BRPM | WEIGHT: 271.38 LBS

## 2022-11-09 DIAGNOSIS — R55 SYNCOPE, UNSPECIFIED SYNCOPE TYPE: Primary | ICD-10-CM

## 2022-11-09 PROCEDURE — 3079F DIAST BP 80-89 MM HG: CPT | Performed by: OTHER

## 2022-11-09 PROCEDURE — 99214 OFFICE O/P EST MOD 30 MIN: CPT | Performed by: OTHER

## 2022-11-09 PROCEDURE — 3077F SYST BP >= 140 MM HG: CPT | Performed by: OTHER

## 2022-11-17 ENCOUNTER — TELEPHONE (OUTPATIENT)
Dept: ORTHOPEDICS CLINIC | Facility: CLINIC | Age: 42
End: 2022-11-17

## 2022-11-17 DIAGNOSIS — S43.432D TEAR OF LEFT GLENOID LABRUM, SUBSEQUENT ENCOUNTER: Primary | ICD-10-CM

## 2022-11-17 DIAGNOSIS — S43.022D POSTERIOR DISLOCATION OF LEFT SHOULDER JOINT, SUBSEQUENT ENCOUNTER: ICD-10-CM

## 2022-11-17 DIAGNOSIS — S42.293A REVERSE HILL-SACHS LESION OF HUMERUS: ICD-10-CM

## 2022-11-17 NOTE — TELEPHONE ENCOUNTER
Patient would like to schedule surgery for his left shoulder. Surgery was discussed at last office visit with Dr. Valentino Holmes on 10/12/22. Patient would like a call back. Please advise, thank you.     Patient can be reached at [phone]

## 2022-11-18 NOTE — TELEPHONE ENCOUNTER
OR BOOKING SHEET SHOULDER  Name: Tiera Esparza  MRN: [id number]   : 1980  Diagnosis:  [x] Tear of left glenoid labrum, subsequent encounter [L88.885R]  Disposition:    [x] Ambulatory  [] Overnight for KERRI  [] Overnight for observation and pain control  [] Inpatient procedure    Operative Time Required:  2.5 hours  Antibiotics: 2 g cefazolin within 60 minutes of surgical incision  Procedure:   Laterality: [] RIGHT [x] LEFT                  [] BILATERAL  Procedures:   [x] Shoulder Arthroscopy  [] Arthrotomy (22217)  [] Arthoscopy/Arthrotomy    [x] Labral repair (26163)  [x] Extensive Debridement (92954)      Additional info:   [] PCP Clearance Needed  [] MRSA  [] C-Arm  [x] TXA at time surgery  [x] Physical Therapy Internal   [x] DME Rx Needed  [] Appt with Dr. Aislinn Presley needed  Implants needed: Arthrex  Positioning Equipment: Lee John

## 2022-11-18 NOTE — TELEPHONE ENCOUNTER
Patient called to follow-up on his request to set an appt for surgery. Please advise. Thanks.     Patient can be reached at [phone]

## 2022-11-21 ENCOUNTER — TELEPHONE (OUTPATIENT)
Dept: ORTHOPEDICS CLINIC | Facility: CLINIC | Age: 42
End: 2022-11-21

## 2022-11-21 DIAGNOSIS — S43.432D TEAR OF LEFT GLENOID LABRUM, SUBSEQUENT ENCOUNTER: Primary | ICD-10-CM

## 2022-11-29 ENCOUNTER — TELEPHONE (OUTPATIENT)
Dept: ORTHOPEDICS CLINIC | Facility: CLINIC | Age: 42
End: 2022-11-29

## 2022-11-29 NOTE — TELEPHONE ENCOUNTER
Patient called to reschedule 12/15 surgery. Patient will be out of town.  Patient would like a call back to: [phone]

## 2022-11-30 ENCOUNTER — TELEPHONE (OUTPATIENT)
Dept: PHYSICAL THERAPY | Facility: HOSPITAL | Age: 42
End: 2022-11-30

## 2022-11-30 NOTE — TELEPHONE ENCOUNTER
INFORMED PATIENT OF DR. LEMON'S AVAILABLE DATES. PATIENT STATED HE WILL CHECK HIS CALENDAR TO SEE WHAT DATES WORK [facility] WILL CALL BACK TO RESCHEDULE SURGERY. SURGERY SCHEDULER'S DIRECT NUMBER GIVEN.

## 2022-12-05 ENCOUNTER — TELEPHONE (OUTPATIENT)
Dept: PHYSICAL THERAPY | Facility: HOSPITAL | Age: 42
End: 2022-12-05

## 2022-12-05 ENCOUNTER — TELEPHONE (OUTPATIENT)
Dept: ORTHOPEDICS CLINIC | Facility: CLINIC | Age: 42
End: 2022-12-05

## 2022-12-06 NOTE — TELEPHONE ENCOUNTER
Called and spoke with Cezar Jarvis in regards to setting up PT. I did inform him that someone has been trying to reach out to him to get him schedule for PT after surgery starting on 12/16. He states that he did speak with someone and has scheduled PT but was not sure how soon Dr. Lee Ann Lebron wanted him to start PT. I did that he would want him to start soon after surgery. He stated understanding. He did also inquire about bracing for surgery and that someone reached out to him for a fitting. I did let him know that our [facility] [address] would reach out to him in regards to a brace. I explained to him that he would need to bring the brace with him to surgery as well because he would be using it right after surgery. I did provide him with [employer] number at . He states that he would reach out to them so that he could get fitted for the brace. All questions and concerns have been addressed and answered.
Patient called and would like to know when he is suppose to start scheduling PT.  Please advise
25-Apr-2019 17:24:36

## 2022-12-07 ENCOUNTER — TELEPHONE (OUTPATIENT)
Dept: PHYSICAL THERAPY | Facility: HOSPITAL | Age: 42
End: 2022-12-07

## 2022-12-12 ENCOUNTER — TELEPHONE (OUTPATIENT)
Dept: PHYSICAL THERAPY | Facility: HOSPITAL | Age: 42
End: 2022-12-12

## 2022-12-12 ENCOUNTER — ANESTHESIA EVENT (OUTPATIENT)
Dept: SURGERY | Facility: HOSPITAL | Age: 42
End: 2022-12-12
Payer: COMMERCIAL

## 2022-12-12 RX ORDER — TRAMADOL HYDROCHLORIDE 50 MG/1
TABLET ORAL
Qty: 20 TABLET | Refills: 1 | Status: SHIPPED | OUTPATIENT
Start: 2022-12-12 | End: 2023-01-04 | Stop reason: CLARIF

## 2022-12-12 RX ORDER — MELOXICAM 15 MG/1
15 TABLET ORAL DAILY
Qty: 14 TABLET | Refills: 1 | Status: SHIPPED | OUTPATIENT
Start: 2022-12-12 | End: 2022-12-12

## 2022-12-12 RX ORDER — ONDANSETRON 4 MG/1
TABLET, FILM COATED ORAL
Qty: 8 TABLET | Refills: 0 | Status: SHIPPED | OUTPATIENT
Start: 2022-12-12 | End: 2023-01-04 | Stop reason: CLARIF

## 2022-12-12 NOTE — PAT NURSING NOTE
CALLED AND SPOKE TO DR. Lex Farris, ANESTHESIA.  , NO OTHER EKG NEEDED, STRESS TEST FROM 09/2022, NORMAL. NO FURTHER CARDIAC WORK UP.   FOR PROCEDURE ON 12/15/2022

## 2022-12-13 ENCOUNTER — LAB ENCOUNTER (OUTPATIENT)
Dept: LAB | Age: 42
End: 2022-12-13
Attending: ORTHOPAEDIC SURGERY
Payer: COMMERCIAL

## 2022-12-13 DIAGNOSIS — Z01.812 ENCOUNTER FOR PREOPERATIVE SCREENING LABORATORY TESTING FOR COVID-19 VIRUS: ICD-10-CM

## 2022-12-13 DIAGNOSIS — Z20.822 ENCOUNTER FOR PREOPERATIVE SCREENING LABORATORY TESTING FOR COVID-19 VIRUS: ICD-10-CM

## 2022-12-13 DIAGNOSIS — S43.432D TEAR OF LEFT GLENOID LABRUM, SUBSEQUENT ENCOUNTER: ICD-10-CM

## 2022-12-13 LAB
ANION GAP SERPL CALC-SCNC: 9 MMOL/L (ref 0–18)
BUN BLD-MCNC: 15 MG/DL (ref 7–18)
CALCIUM BLD-MCNC: 8.5 MG/DL (ref 8.5–10.1)
CHLORIDE SERPL-SCNC: 108 MMOL/L (ref 98–112)
CO2 SERPL-SCNC: 24 MMOL/L (ref 21–32)
CREAT BLD-MCNC: 1.3 MG/DL
GFR SERPLBLD BASED ON 1.73 SQ M-ARVRAT: 69 ML/MIN/1.73M2 (ref 60–?)
GLUCOSE BLD-MCNC: 111 MG/DL (ref 70–99)
OSMOLALITY SERPL CALC.SUM OF ELEC: 294 MOSM/KG (ref 275–295)
POTASSIUM SERPL-SCNC: 3.4 MMOL/L (ref 3.5–5.1)
SODIUM SERPL-SCNC: 141 MMOL/L (ref 136–145)

## 2022-12-13 PROCEDURE — 36415 COLL VENOUS BLD VENIPUNCTURE: CPT

## 2022-12-13 PROCEDURE — 80048 BASIC METABOLIC PNL TOTAL CA: CPT

## 2022-12-14 LAB — SARS-COV-2 RNA RESP QL NAA+PROBE: NOT DETECTED

## 2022-12-14 RX ORDER — TRANEXAMIC ACID 10 MG/ML
1000 INJECTION, SOLUTION INTRAVENOUS ONCE
Status: CANCELLED | OUTPATIENT
Start: 2022-12-14 | End: 2022-12-14

## 2022-12-15 ENCOUNTER — HOSPITAL ENCOUNTER (OUTPATIENT)
Facility: HOSPITAL | Age: 42
Setting detail: HOSPITAL OUTPATIENT SURGERY
Discharge: HOME | End: 2022-12-15
Attending: ORTHOPAEDIC SURGERY | Admitting: ORTHOPAEDIC SURGERY
Payer: COMMERCIAL

## 2022-12-15 ENCOUNTER — ANESTHESIA (OUTPATIENT)
Dept: SURGERY | Facility: HOSPITAL | Age: 42
End: 2022-12-15
Payer: COMMERCIAL

## 2022-12-15 VITALS
WEIGHT: 257 LBS | RESPIRATION RATE: 18 BRPM | BODY MASS INDEX: 31.29 KG/M2 | HEART RATE: 81 BPM | OXYGEN SATURATION: 98 % | TEMPERATURE: 98 F | SYSTOLIC BLOOD PRESSURE: 154 MMHG | DIASTOLIC BLOOD PRESSURE: 98 MMHG | HEIGHT: 76 IN

## 2022-12-15 DIAGNOSIS — Z20.822 ENCOUNTER FOR PREOPERATIVE SCREENING LABORATORY TESTING FOR COVID-19 VIRUS: ICD-10-CM

## 2022-12-15 DIAGNOSIS — S43.432D TEAR OF LEFT GLENOID LABRUM, SUBSEQUENT ENCOUNTER: Primary | ICD-10-CM

## 2022-12-15 DIAGNOSIS — Z01.812 ENCOUNTER FOR PREOPERATIVE SCREENING LABORATORY TESTING FOR COVID-19 VIRUS: ICD-10-CM

## 2022-12-15 PROCEDURE — 0RQK4ZZ REPAIR LEFT SHOULDER JOINT, PERCUTANEOUS ENDOSCOPIC APPROACH: ICD-10-PCS | Performed by: ORTHOPAEDIC SURGERY

## 2022-12-15 PROCEDURE — 0RNK4ZZ RELEASE LEFT SHOULDER JOINT, PERCUTANEOUS ENDOSCOPIC APPROACH: ICD-10-PCS | Performed by: ORTHOPAEDIC SURGERY

## 2022-12-15 PROCEDURE — 0RHK44Z INSERTION OF INTERNAL FIXATION DEVICE INTO LEFT SHOULDER JOINT, PERCUTANEOUS ENDOSCOPIC APPROACH: ICD-10-PCS | Performed by: ORTHOPAEDIC SURGERY

## 2022-12-15 PROCEDURE — 76942 ECHO GUIDE FOR BIOPSY: CPT | Performed by: ANESTHESIOLOGY

## 2022-12-15 PROCEDURE — 0LS44ZZ REPOSITION LEFT UPPER ARM TENDON, PERCUTANEOUS ENDOSCOPIC APPROACH: ICD-10-PCS | Performed by: ORTHOPAEDIC SURGERY

## 2022-12-15 DEVICE — PEEK SWIVELOCK C,4.75X19.1MM
Type: IMPLANTABLE DEVICE | Site: SHOULDER | Status: FUNCTIONAL
Brand: ARTHREX®

## 2022-12-15 DEVICE — SUTR ANCH,PEEK P-LCK,2.9X 12.5MM
Type: IMPLANTABLE DEVICE | Site: SHOULDER | Status: FUNCTIONAL
Brand: ARTHREX®

## 2022-12-15 RX ORDER — SODIUM CHLORIDE, SODIUM LACTATE, POTASSIUM CHLORIDE, CALCIUM CHLORIDE 600; 310; 30; 20 MG/100ML; MG/100ML; MG/100ML; MG/100ML
INJECTION, SOLUTION INTRAVENOUS CONTINUOUS
Status: DISCONTINUED | OUTPATIENT
Start: 2022-12-15 | End: 2022-12-15

## 2022-12-15 RX ORDER — ACETAMINOPHEN 500 MG
1000 TABLET ORAL ONCE
Status: DISCONTINUED | OUTPATIENT
Start: 2022-12-15 | End: 2022-12-15 | Stop reason: HOSPADM

## 2022-12-15 RX ORDER — HYDROMORPHONE HYDROCHLORIDE 1 MG/ML
0.4 INJECTION, SOLUTION INTRAMUSCULAR; INTRAVENOUS; SUBCUTANEOUS EVERY 5 MIN PRN
Status: DISCONTINUED | OUTPATIENT
Start: 2022-12-15 | End: 2022-12-15

## 2022-12-15 RX ORDER — ONDANSETRON 2 MG/ML
4 INJECTION INTRAMUSCULAR; INTRAVENOUS EVERY 6 HOURS PRN
Status: DISCONTINUED | OUTPATIENT
Start: 2022-12-15 | End: 2022-12-15

## 2022-12-15 RX ORDER — LIDOCAINE HYDROCHLORIDE 10 MG/ML
INJECTION, SOLUTION EPIDURAL; INFILTRATION; INTRACAUDAL; PERINEURAL AS NEEDED
Status: DISCONTINUED | OUTPATIENT
Start: 2022-12-15 | End: 2022-12-15 | Stop reason: SURG

## 2022-12-15 RX ORDER — ONDANSETRON 2 MG/ML
INJECTION INTRAMUSCULAR; INTRAVENOUS AS NEEDED
Status: DISCONTINUED | OUTPATIENT
Start: 2022-12-15 | End: 2022-12-15 | Stop reason: SURG

## 2022-12-15 RX ORDER — GLYCOPYRROLATE 0.2 MG/ML
INJECTION, SOLUTION INTRAMUSCULAR; INTRAVENOUS AS NEEDED
Status: DISCONTINUED | OUTPATIENT
Start: 2022-12-15 | End: 2022-12-15 | Stop reason: SURG

## 2022-12-15 RX ORDER — NEOSTIGMINE METHYLSULFATE 1 MG/ML
INJECTION, SOLUTION INTRAVENOUS AS NEEDED
Status: DISCONTINUED | OUTPATIENT
Start: 2022-12-15 | End: 2022-12-15 | Stop reason: SURG

## 2022-12-15 RX ORDER — HYDROMORPHONE HYDROCHLORIDE 1 MG/ML
0.6 INJECTION, SOLUTION INTRAMUSCULAR; INTRAVENOUS; SUBCUTANEOUS EVERY 5 MIN PRN
Status: DISCONTINUED | OUTPATIENT
Start: 2022-12-15 | End: 2022-12-15

## 2022-12-15 RX ORDER — MIDAZOLAM HYDROCHLORIDE 1 MG/ML
INJECTION INTRAMUSCULAR; INTRAVENOUS AS NEEDED
Status: DISCONTINUED | OUTPATIENT
Start: 2022-12-15 | End: 2022-12-15 | Stop reason: SURG

## 2022-12-15 RX ORDER — TRANEXAMIC ACID 10 MG/ML
1000 INJECTION, SOLUTION INTRAVENOUS ONCE
Status: COMPLETED | OUTPATIENT
Start: 2022-12-15 | End: 2022-12-15

## 2022-12-15 RX ORDER — PHENYLEPHRINE HCL 10 MG/ML
VIAL (ML) INJECTION AS NEEDED
Status: DISCONTINUED | OUTPATIENT
Start: 2022-12-15 | End: 2022-12-15 | Stop reason: SURG

## 2022-12-15 RX ORDER — ROCURONIUM BROMIDE 10 MG/ML
INJECTION, SOLUTION INTRAVENOUS AS NEEDED
Status: DISCONTINUED | OUTPATIENT
Start: 2022-12-15 | End: 2022-12-15 | Stop reason: SURG

## 2022-12-15 RX ORDER — HYDROCODONE BITARTRATE AND ACETAMINOPHEN 5; 325 MG/1; MG/1
2 TABLET ORAL ONCE AS NEEDED
Status: DISCONTINUED | OUTPATIENT
Start: 2022-12-15 | End: 2022-12-15

## 2022-12-15 RX ORDER — DEXAMETHASONE SODIUM PHOSPHATE 4 MG/ML
VIAL (ML) INJECTION AS NEEDED
Status: DISCONTINUED | OUTPATIENT
Start: 2022-12-15 | End: 2022-12-15 | Stop reason: SURG

## 2022-12-15 RX ORDER — CEFAZOLIN SODIUM IN 0.9 % NACL 3 G/100 ML
3 INTRAVENOUS SOLUTION, PIGGYBACK (ML) INTRAVENOUS ONCE
Status: DISCONTINUED | OUTPATIENT
Start: 2022-12-15 | End: 2022-12-15 | Stop reason: HOSPADM

## 2022-12-15 RX ORDER — HYDROCODONE BITARTRATE AND ACETAMINOPHEN 5; 325 MG/1; MG/1
1 TABLET ORAL ONCE AS NEEDED
Status: DISCONTINUED | OUTPATIENT
Start: 2022-12-15 | End: 2022-12-15

## 2022-12-15 RX ORDER — PROCHLORPERAZINE EDISYLATE 5 MG/ML
5 INJECTION INTRAMUSCULAR; INTRAVENOUS EVERY 8 HOURS PRN
Status: DISCONTINUED | OUTPATIENT
Start: 2022-12-15 | End: 2022-12-15

## 2022-12-15 RX ORDER — ACETAMINOPHEN 500 MG
1000 TABLET ORAL ONCE AS NEEDED
Status: DISCONTINUED | OUTPATIENT
Start: 2022-12-15 | End: 2022-12-15

## 2022-12-15 RX ORDER — SCOLOPAMINE TRANSDERMAL SYSTEM 1 MG/1
1 PATCH, EXTENDED RELEASE TRANSDERMAL ONCE
Status: DISCONTINUED | OUTPATIENT
Start: 2022-12-15 | End: 2022-12-15 | Stop reason: HOSPADM

## 2022-12-15 RX ORDER — HYDROMORPHONE HYDROCHLORIDE 1 MG/ML
0.2 INJECTION, SOLUTION INTRAMUSCULAR; INTRAVENOUS; SUBCUTANEOUS EVERY 5 MIN PRN
Status: DISCONTINUED | OUTPATIENT
Start: 2022-12-15 | End: 2022-12-15

## 2022-12-15 RX ORDER — NALOXONE HYDROCHLORIDE 0.4 MG/ML
80 INJECTION, SOLUTION INTRAMUSCULAR; INTRAVENOUS; SUBCUTANEOUS AS NEEDED
Status: DISCONTINUED | OUTPATIENT
Start: 2022-12-15 | End: 2022-12-15

## 2022-12-15 RX ADMIN — LIDOCAINE HYDROCHLORIDE 50 MG: 10 INJECTION, SOLUTION EPIDURAL; INFILTRATION; INTRACAUDAL; PERINEURAL at 14:21:00

## 2022-12-15 RX ADMIN — PHENYLEPHRINE HCL 100 MCG: 10 MG/ML VIAL (ML) INJECTION at 15:00:00

## 2022-12-15 RX ADMIN — PHENYLEPHRINE HCL 100 MCG: 10 MG/ML VIAL (ML) INJECTION at 15:28:00

## 2022-12-15 RX ADMIN — DEXAMETHASONE SODIUM PHOSPHATE 8 MG: 4 MG/ML VIAL (ML) INJECTION at 15:53:00

## 2022-12-15 RX ADMIN — NEOSTIGMINE METHYLSULFATE 5 MG: 1 INJECTION, SOLUTION INTRAVENOUS at 15:48:00

## 2022-12-15 RX ADMIN — PHENYLEPHRINE HCL 100 MCG: 10 MG/ML VIAL (ML) INJECTION at 15:17:00

## 2022-12-15 RX ADMIN — GLYCOPYRROLATE 0.8 MG: 0.2 INJECTION, SOLUTION INTRAMUSCULAR; INTRAVENOUS at 15:48:00

## 2022-12-15 RX ADMIN — MIDAZOLAM HYDROCHLORIDE 2 MG: 1 INJECTION INTRAMUSCULAR; INTRAVENOUS at 14:09:00

## 2022-12-15 RX ADMIN — PHENYLEPHRINE HCL 100 MCG: 10 MG/ML VIAL (ML) INJECTION at 15:11:00

## 2022-12-15 RX ADMIN — PHENYLEPHRINE HCL 100 MCG: 10 MG/ML VIAL (ML) INJECTION at 15:36:00

## 2022-12-15 RX ADMIN — ONDANSETRON 4 MG: 2 INJECTION INTRAMUSCULAR; INTRAVENOUS at 15:53:00

## 2022-12-15 RX ADMIN — TRANEXAMIC ACID 1000 MG: 10 INJECTION, SOLUTION INTRAVENOUS at 14:34:00

## 2022-12-15 RX ADMIN — SODIUM CHLORIDE, SODIUM LACTATE, POTASSIUM CHLORIDE, CALCIUM CHLORIDE: 600; 310; 30; 20 INJECTION, SOLUTION INTRAVENOUS at 16:13:00

## 2022-12-15 RX ADMIN — ROCURONIUM BROMIDE 50 MG: 10 INJECTION, SOLUTION INTRAVENOUS at 14:21:00

## 2022-12-15 NOTE — ANESTHESIA PROCEDURE NOTES
Airway  Date/Time: 12/15/2022 2:23 PM  Urgency: elective    Airway not difficult    General Information and Staff    Patient location during procedure: OR  Anesthesiologist: Marcy Crigler, DO  Performed: anesthesiologist     Indications and Patient Condition  Indications for airway management: anesthesia  Sedation level: deep  Preoxygenated: yes  Patient position: sniffing  Mask difficulty assessment: 1 - vent by mask    Final Airway Details  Final airway type: endotracheal airway      Successful airway: ETT  Cuffed: yes   Successful intubation technique: direct laryngoscopy  Endotracheal tube insertion site: oral  Blade: Carla  Blade size: #4  ETT size (mm): 7.5    Cormack-Lehane Classification: grade IIA - partial view of glottis  Placement verified by: chest auscultation and capnometry   Measured from: lips  ETT to lips (cm): 24  Number of attempts at approach: 1  Number of other approaches attempted: 0

## 2022-12-15 NOTE — BRIEF OP NOTE
Pre-Operative Diagnosis: Tear of left glenoid labrum, subsequent encounter [S43.432D]     Post-Operative Diagnosis: Tear of left glenoid labrum, subsequent encounter [S42.432D]      Procedure Performed:   LEFT SHOULDER ARTHROSCOPY WITH LABRAL REPAIR, AND EXTENSIVE DEBRIDEMENT, BICEPS TENODESIS, SUPERIOR LABRUM ANTERIOR AND POSTERIOR REPAIR    Surgeon(s) and Role:     * Esau Cornejo MD - Primary    Assistant(s):  Surgical Assistant.: Billy Jacobson, [address] [place] [address]     Surgical Findings: Glenoid labral tear extending from approximately 630-200 involving the inferior, posterior inferior, posterior and posterior superior labrum. Additional type II SLAP tear identified extending from 10-1, managed with arthroscopic biceps tenodesis as well as SLAP repair. Posterior and posterior inferior capsulorrhaphy performed with sequential horizontal mattress FiberWire into 2.9 mm push lock anchors with excellent stability. Adequate debridement of glenoid bone, glenoid labrum, glenoid cartilage as well as subacromial bursa performed. Overall well-maintained articular cartilage within the glenohumeral joint. Normal rotator cuff findings.       Specimen: None     Estimated Blood Loss: Blood Output: 10 mL (12/15/2022  3:52 PM)      Dictation Number:  Nguyen Maria MD  12/15/2022  4:26 PM

## 2022-12-15 NOTE — ANESTHESIA PROCEDURE NOTES
Regional Block    Date/Time: 12/15/2022 2:14 PM  Performed by: Billy Duggan DO  Authorized by: Billy Duggan DO       General Information and Staff    Start Time:  12/15/2022 2:14 PM  End Time:  12/15/2022 2:18 PM  Anesthesiologist:  Billy Duggan DO  Performed by: Anesthesiologist  Patient Location:  OR    Block Placement: Pre Induction  Site Identification: real time ultrasound guided and image stored and retrievable    Block site/laterality marked before start: site marked  Reason for Block: at surgeon's request and post-op pain management    Preanesthetic Checklist: 2 patient identifers, IV checked, risks and benefits discussed, monitors and equipment checked, pre-op evaluation, timeout performed, anesthesia consent, sterile technique used, no prohibitive neurological deficits and no local skin infection at insertion site      Procedure Details    Patient Position:  Supine  Prep: ChloraPrep    Monitoring:  Cardiac monitor, continuous pulse ox and blood pressure cuff  Block Type: Interscalene  Laterality:  Left  Injection Technique:  Single-shot    Needle    Needle Type:  Short-bevel and echogenic  Needle Gauge:  21 G  Needle Length:  100 mm  Needle Localization:  Ultrasound guidance  Reason for Ultrasound Use: appropriate spread of the medication was noted in real time and no ultrasound evidence of intravascular and/or intraneural injection            Assessment    Injection Assessment:  Good spread noted, negative resistance, negative aspiration for heme, incremental injection and low pressure  Heart Rate Change: No    - Patient tolerated block procedure well without evidence of immediate block related complications.      Medications  12/15/2022 2:14 PM      Additional Comments    Medication:  Ropivacaine 0.5% 20mL

## 2022-12-16 ENCOUNTER — OFFICE VISIT (OUTPATIENT)
Dept: PHYSICAL THERAPY | Facility: HOSPITAL | Age: 42
End: 2022-12-16
Attending: ORTHOPAEDIC SURGERY
Payer: COMMERCIAL

## 2022-12-16 PROCEDURE — 97162 PT EVAL MOD COMPLEX 30 MIN: CPT

## 2022-12-16 PROCEDURE — 97140 MANUAL THERAPY 1/> REGIONS: CPT

## 2022-12-19 ENCOUNTER — OFFICE VISIT (OUTPATIENT)
Dept: PHYSICAL THERAPY | Facility: HOSPITAL | Age: 42
End: 2022-12-19
Attending: ORTHOPAEDIC SURGERY
Payer: COMMERCIAL

## 2022-12-19 PROCEDURE — 97110 THERAPEUTIC EXERCISES: CPT

## 2022-12-19 PROCEDURE — 97140 MANUAL THERAPY 1/> REGIONS: CPT

## 2022-12-20 ENCOUNTER — TELEPHONE (OUTPATIENT)
Dept: PHYSICAL THERAPY | Facility: HOSPITAL | Age: 42
End: 2022-12-20

## 2022-12-22 ENCOUNTER — TELEPHONE (OUTPATIENT)
Dept: PHYSICAL THERAPY | Facility: HOSPITAL | Age: 42
End: 2022-12-22

## 2022-12-22 ENCOUNTER — OFFICE VISIT (OUTPATIENT)
Dept: PHYSICAL THERAPY | Facility: HOSPITAL | Age: 42
End: 2022-12-22
Attending: ORTHOPAEDIC SURGERY
Payer: COMMERCIAL

## 2022-12-22 PROCEDURE — 97140 MANUAL THERAPY 1/> REGIONS: CPT

## 2022-12-23 ENCOUNTER — APPOINTMENT (OUTPATIENT)
Dept: PHYSICAL THERAPY | Facility: HOSPITAL | Age: 42
End: 2022-12-23
Attending: ORTHOPAEDIC SURGERY
Payer: COMMERCIAL

## 2022-12-23 ENCOUNTER — TELEMEDICINE (OUTPATIENT)
Dept: ORTHOPEDICS CLINIC | Facility: CLINIC | Age: 42
End: 2022-12-23
Payer: COMMERCIAL

## 2022-12-23 DIAGNOSIS — Z98.890 S/P ARTHROSCOPY OF SHOULDER: Primary | ICD-10-CM

## 2022-12-23 PROCEDURE — 99024 POSTOP FOLLOW-UP VISIT: CPT | Performed by: PHYSICIAN ASSISTANT

## 2022-12-26 ENCOUNTER — HOSPITAL ENCOUNTER (INPATIENT)
Facility: HOSPITAL | Age: 42
LOS: 3 days | Discharge: HOME | End: 2022-12-29
Attending: EMERGENCY MEDICINE | Admitting: INTERNAL MEDICINE
Payer: COMMERCIAL

## 2022-12-26 ENCOUNTER — APPOINTMENT (OUTPATIENT)
Dept: GENERAL RADIOLOGY | Age: 42
End: 2022-12-26
Attending: EMERGENCY MEDICINE
Payer: COMMERCIAL

## 2022-12-26 ENCOUNTER — HOSPITAL ENCOUNTER (OUTPATIENT)
Age: 42
Discharge: ANOTHER HOSPITAL | End: 2022-12-26
Attending: EMERGENCY MEDICINE
Payer: COMMERCIAL

## 2022-12-26 ENCOUNTER — APPOINTMENT (OUTPATIENT)
Dept: CV DIAGNOSTICS | Facility: HOSPITAL | Age: 42
End: 2022-12-26
Attending: EMERGENCY MEDICINE
Payer: COMMERCIAL

## 2022-12-26 ENCOUNTER — APPOINTMENT (OUTPATIENT)
Dept: ULTRASOUND IMAGING | Facility: HOSPITAL | Age: 42
End: 2022-12-26
Attending: INTERNAL MEDICINE
Payer: COMMERCIAL

## 2022-12-26 ENCOUNTER — APPOINTMENT (OUTPATIENT)
Dept: CT IMAGING | Age: 42
End: 2022-12-26
Attending: EMERGENCY MEDICINE
Payer: COMMERCIAL

## 2022-12-26 VITALS
SYSTOLIC BLOOD PRESSURE: 152 MMHG | WEIGHT: 255 LBS | DIASTOLIC BLOOD PRESSURE: 90 MMHG | BODY MASS INDEX: 31 KG/M2 | HEART RATE: 99 BPM | TEMPERATURE: 99 F | OXYGEN SATURATION: 96 % | RESPIRATION RATE: 20 BRPM

## 2022-12-26 DIAGNOSIS — I26.01 ACUTE SEPTIC PULMONARY EMBOLISM WITH ACUTE COR PULMONALE (HCC): Primary | ICD-10-CM

## 2022-12-26 DIAGNOSIS — E87.6 HYPOKALEMIA: ICD-10-CM

## 2022-12-26 DIAGNOSIS — I26.99 PULMONARY INFARCT (HCC): ICD-10-CM

## 2022-12-26 DIAGNOSIS — I26.99 BILATERAL PULMONARY EMBOLISM (HCC): Primary | ICD-10-CM

## 2022-12-26 LAB
#MXD IC: 0.8 X10ˆ3/UL (ref 0.1–1)
ALBUMIN SERPL-MCNC: 2.8 G/DL (ref 3.4–5)
ALBUMIN/GLOB SERPL: 0.7 {RATIO} (ref 1–2)
ALP LIVER SERPL-CCNC: 39 U/L
ALT SERPL-CCNC: 15 U/L
ANION GAP SERPL CALC-SCNC: 5 MMOL/L (ref 0–18)
APTT PPP: 122 SECONDS (ref 23.3–35.6)
APTT PPP: 34.9 SECONDS (ref 23.3–35.6)
AST SERPL-CCNC: 13 U/L (ref 15–37)
ATRIAL RATE: 99 BPM
BILIRUB SERPL-MCNC: 0.7 MG/DL (ref 0.1–2)
BUN BLD-MCNC: 12 MG/DL (ref 7–18)
BUN BLD-MCNC: 13 MG/DL (ref 7–18)
CALCIUM BLD-MCNC: 8.4 MG/DL (ref 8.5–10.1)
CHLORIDE BLD-SCNC: 100 MMOL/L (ref 98–112)
CHLORIDE SERPL-SCNC: 106 MMOL/L (ref 98–112)
CO2 BLD-SCNC: 28 MMOL/L (ref 21–32)
CO2 SERPL-SCNC: 26 MMOL/L (ref 21–32)
CREAT BLD-MCNC: 1.37 MG/DL
CREAT BLD-MCNC: 1.4 MG/DL
DDIMER WHOLE BLOOD: 2940 NG/ML DDU (ref ?–400)
GFR SERPLBLD BASED ON 1.73 SQ M-ARVRAT: 64 ML/MIN/1.73M2 (ref 60–?)
GFR SERPLBLD BASED ON 1.73 SQ M-ARVRAT: 65 ML/MIN/1.73M2 (ref 60–?)
GLOBULIN PLAS-MCNC: 4 G/DL (ref 2.8–4.4)
GLUCOSE BLD-MCNC: 109 MG/DL (ref 70–99)
GLUCOSE BLD-MCNC: 114 MG/DL (ref 70–99)
HCT VFR BLD AUTO: 39.7 %
HCT VFR BLD CALC: 40 %
HGB BLD-MCNC: 13.4 G/DL
INR BLD: 1.22 (ref 0.85–1.16)
ISTAT IONIZED CALCIUM FOR CHEM 8: 1.16 MMOL/L (ref 1.12–1.32)
LYMPHOCYTES # BLD AUTO: 0.6 X10ˆ3/UL (ref 1–4)
LYMPHOCYTES NFR BLD AUTO: 6.3 %
MCH RBC QN AUTO: 28 PG (ref 26–34)
MCHC RBC AUTO-ENTMCNC: 33.8 G/DL (ref 31–37)
MCV RBC AUTO: 83.1 FL (ref 80–100)
MIXED CELL %: 9.3 %
NEUTROPHILS # BLD AUTO: 7.5 X10ˆ3/UL (ref 1.5–7.7)
NEUTROPHILS NFR BLD AUTO: 84.4 %
NT-PROBNP SERPL-MCNC: 208 PG/ML (ref ?–125)
OSMOLALITY SERPL CALC.SUM OF ELEC: 285 MOSM/KG (ref 275–295)
P AXIS: 29 DEGREES
P-R INTERVAL: 166 MS
PLATELET # BLD AUTO: 152 X10ˆ3/UL (ref 150–450)
POCT INFLUENZA A: NEGATIVE
POCT INFLUENZA B: NEGATIVE
POTASSIUM BLD-SCNC: 3.3 MMOL/L (ref 3.6–5.1)
POTASSIUM SERPL-SCNC: 3.5 MMOL/L (ref 3.5–5.1)
PROT SERPL-MCNC: 6.8 G/DL (ref 6.4–8.2)
PROTHROMBIN TIME: 15.3 SECONDS (ref 11.6–14.8)
Q-T INTERVAL: 342 MS
QRS DURATION: 88 MS
QTC CALCULATION (BEZET): 438 MS
R AXIS: 2 DEGREES
RBC # BLD AUTO: 4.78 X10ˆ6/UL
SARS-COV-2 RNA RESP QL NAA+PROBE: NOT DETECTED
SODIUM BLD-SCNC: 139 MMOL/L (ref 136–145)
SODIUM SERPL-SCNC: 137 MMOL/L (ref 136–145)
T AXIS: -11 DEGREES
TROPONIN I BLD-MCNC: <0.02 NG/ML
VENTRICULAR RATE: 99 BPM
WBC # BLD AUTO: 8.9 X10ˆ3/UL (ref 4–11)

## 2022-12-26 PROCEDURE — 87502 INFLUENZA DNA AMP PROBE: CPT | Performed by: NURSE PRACTITIONER

## 2022-12-26 PROCEDURE — 84484 ASSAY OF TROPONIN QUANT: CPT

## 2022-12-26 PROCEDURE — 93005 ELECTROCARDIOGRAM TRACING: CPT

## 2022-12-26 PROCEDURE — 99215 OFFICE O/P EST HI 40 MIN: CPT

## 2022-12-26 PROCEDURE — 99223 1ST HOSP IP/OBS HIGH 75: CPT | Performed by: INTERNAL MEDICINE

## 2022-12-26 PROCEDURE — 80047 BASIC METABLC PNL IONIZED CA: CPT

## 2022-12-26 PROCEDURE — 93970 EXTREMITY STUDY: CPT | Performed by: INTERNAL MEDICINE

## 2022-12-26 PROCEDURE — 93010 ELECTROCARDIOGRAM REPORT: CPT

## 2022-12-26 PROCEDURE — 71275 CT ANGIOGRAPHY CHEST: CPT | Performed by: EMERGENCY MEDICINE

## 2022-12-26 PROCEDURE — 85025 COMPLETE CBC W/AUTO DIFF WBC: CPT | Performed by: EMERGENCY MEDICINE

## 2022-12-26 PROCEDURE — 85378 FIBRIN DEGRADE SEMIQUANT: CPT | Performed by: EMERGENCY MEDICINE

## 2022-12-26 PROCEDURE — 71046 X-RAY EXAM CHEST 2 VIEWS: CPT | Performed by: EMERGENCY MEDICINE

## 2022-12-26 PROCEDURE — 96374 THER/PROPH/DIAG INJ IV PUSH: CPT

## 2022-12-26 RX ORDER — AMLODIPINE BESYLATE 5 MG/1
5 TABLET ORAL DAILY
Status: DISCONTINUED | OUTPATIENT
Start: 2022-12-27 | End: 2022-12-26

## 2022-12-26 RX ORDER — LABETALOL HYDROCHLORIDE 5 MG/ML
10 INJECTION, SOLUTION INTRAVENOUS EVERY 4 HOURS PRN
Status: DISCONTINUED | OUTPATIENT
Start: 2022-12-26 | End: 2022-12-27

## 2022-12-26 RX ORDER — AMLODIPINE BESYLATE 5 MG/1
5 TABLET ORAL DAILY
Status: DISCONTINUED | OUTPATIENT
Start: 2022-12-26 | End: 2022-12-27

## 2022-12-26 RX ORDER — SODIUM PHOSPHATE, DIBASIC AND SODIUM PHOSPHATE, MONOBASIC 7; 19 G/133ML; G/133ML
1 ENEMA RECTAL ONCE AS NEEDED
Status: DISCONTINUED | OUTPATIENT
Start: 2022-12-26 | End: 2022-12-29

## 2022-12-26 RX ORDER — HEPARIN SODIUM AND DEXTROSE 10000; 5 [USP'U]/100ML; G/100ML
INJECTION INTRAVENOUS CONTINUOUS
Status: DISCONTINUED | OUTPATIENT
Start: 2022-12-27 | End: 2022-12-28

## 2022-12-26 RX ORDER — PROCHLORPERAZINE EDISYLATE 5 MG/ML
5 INJECTION INTRAMUSCULAR; INTRAVENOUS EVERY 8 HOURS PRN
Status: DISCONTINUED | OUTPATIENT
Start: 2022-12-26 | End: 2022-12-29

## 2022-12-26 RX ORDER — MELATONIN
3 NIGHTLY PRN
Status: DISCONTINUED | OUTPATIENT
Start: 2022-12-26 | End: 2022-12-29

## 2022-12-26 RX ORDER — BISACODYL 10 MG
10 SUPPOSITORY, RECTAL RECTAL
Status: DISCONTINUED | OUTPATIENT
Start: 2022-12-26 | End: 2022-12-29

## 2022-12-26 RX ORDER — LOSARTAN POTASSIUM 100 MG/1
100 TABLET ORAL NIGHTLY
Status: DISCONTINUED | OUTPATIENT
Start: 2022-12-26 | End: 2022-12-29

## 2022-12-26 RX ORDER — HYDROMORPHONE HYDROCHLORIDE 1 MG/ML
0.8 INJECTION, SOLUTION INTRAMUSCULAR; INTRAVENOUS; SUBCUTANEOUS EVERY 2 HOUR PRN
Status: DISCONTINUED | OUTPATIENT
Start: 2022-12-26 | End: 2022-12-29

## 2022-12-26 RX ORDER — ONDANSETRON 2 MG/ML
4 INJECTION INTRAMUSCULAR; INTRAVENOUS EVERY 6 HOURS PRN
Status: DISCONTINUED | OUTPATIENT
Start: 2022-12-26 | End: 2022-12-29

## 2022-12-26 RX ORDER — SENNOSIDES 8.6 MG
17.2 TABLET ORAL NIGHTLY PRN
Status: DISCONTINUED | OUTPATIENT
Start: 2022-12-26 | End: 2022-12-29

## 2022-12-26 RX ORDER — HEPARIN SODIUM AND DEXTROSE 10000; 5 [USP'U]/100ML; G/100ML
18 INJECTION INTRAVENOUS ONCE
Status: COMPLETED | OUTPATIENT
Start: 2022-12-26 | End: 2022-12-26

## 2022-12-26 RX ORDER — ACETAMINOPHEN 500 MG
500 TABLET ORAL EVERY 4 HOURS PRN
Status: DISCONTINUED | OUTPATIENT
Start: 2022-12-26 | End: 2022-12-29

## 2022-12-26 RX ORDER — KETOROLAC TROMETHAMINE 30 MG/ML
30 INJECTION, SOLUTION INTRAMUSCULAR; INTRAVENOUS ONCE
Status: COMPLETED | OUTPATIENT
Start: 2022-12-26 | End: 2022-12-26

## 2022-12-26 RX ORDER — ONDANSETRON 2 MG/ML
4 INJECTION INTRAMUSCULAR; INTRAVENOUS EVERY 4 HOURS PRN
Status: DISCONTINUED | OUTPATIENT
Start: 2022-12-26 | End: 2022-12-26

## 2022-12-26 RX ORDER — TRAMADOL HYDROCHLORIDE 50 MG/1
50 TABLET ORAL EVERY 6 HOURS PRN
Status: DISCONTINUED | OUTPATIENT
Start: 2022-12-26 | End: 2022-12-29

## 2022-12-26 RX ORDER — HYDROMORPHONE HYDROCHLORIDE 1 MG/ML
0.2 INJECTION, SOLUTION INTRAMUSCULAR; INTRAVENOUS; SUBCUTANEOUS EVERY 2 HOUR PRN
Status: DISCONTINUED | OUTPATIENT
Start: 2022-12-26 | End: 2022-12-29

## 2022-12-26 RX ORDER — ALBUTEROL SULFATE 90 UG/1
2 AEROSOL, METERED RESPIRATORY (INHALATION) EVERY 6 HOURS PRN
Status: DISCONTINUED | OUTPATIENT
Start: 2022-12-26 | End: 2022-12-29

## 2022-12-26 RX ORDER — HEPARIN SODIUM 1000 [USP'U]/ML
80 INJECTION, SOLUTION INTRAVENOUS; SUBCUTANEOUS ONCE
Status: COMPLETED | OUTPATIENT
Start: 2022-12-26 | End: 2022-12-26

## 2022-12-26 RX ORDER — POTASSIUM CHLORIDE 20 MEQ/1
20 TABLET, EXTENDED RELEASE ORAL ONCE
Status: COMPLETED | OUTPATIENT
Start: 2022-12-26 | End: 2022-12-26

## 2022-12-26 RX ORDER — HYDROMORPHONE HYDROCHLORIDE 1 MG/ML
0.4 INJECTION, SOLUTION INTRAMUSCULAR; INTRAVENOUS; SUBCUTANEOUS EVERY 2 HOUR PRN
Status: DISCONTINUED | OUTPATIENT
Start: 2022-12-26 | End: 2022-12-29

## 2022-12-26 RX ORDER — POLYETHYLENE GLYCOL 3350 17 G/17G
17 POWDER, FOR SOLUTION ORAL DAILY PRN
Status: DISCONTINUED | OUTPATIENT
Start: 2022-12-26 | End: 2022-12-29

## 2022-12-26 RX ORDER — HYDROMORPHONE HYDROCHLORIDE 1 MG/ML
0.5 INJECTION, SOLUTION INTRAMUSCULAR; INTRAVENOUS; SUBCUTANEOUS EVERY 30 MIN PRN
Status: DISCONTINUED | OUTPATIENT
Start: 2022-12-26 | End: 2022-12-26

## 2022-12-26 RX ORDER — HYDRALAZINE HYDROCHLORIDE 20 MG/ML
10 INJECTION INTRAMUSCULAR; INTRAVENOUS EVERY 6 HOURS PRN
Status: DISCONTINUED | OUTPATIENT
Start: 2022-12-26 | End: 2022-12-29

## 2022-12-26 NOTE — ED INITIAL ASSESSMENT (HPI)
Pain to right ribs starting around 12/24. +Pain with inspiration. Reports fever last night of 102. Had shoulder surgery on 12/15.

## 2022-12-27 ENCOUNTER — APPOINTMENT (OUTPATIENT)
Dept: CV DIAGNOSTICS | Facility: HOSPITAL | Age: 42
End: 2022-12-27
Attending: INTERNAL MEDICINE
Payer: COMMERCIAL

## 2022-12-27 PROBLEM — I26.99 PULMONARY EMBOLI (HCC): Status: ACTIVE | Noted: 2022-12-27

## 2022-12-27 PROBLEM — I26.01 ACUTE SEPTIC PULMONARY EMBOLISM WITH ACUTE COR PULMONALE (HCC): Status: RESOLVED | Noted: 2022-12-26 | Resolved: 2022-12-27

## 2022-12-27 LAB
ALBUMIN SERPL-MCNC: 2.6 G/DL (ref 3.4–5)
ALBUMIN/GLOB SERPL: 0.7 {RATIO} (ref 1–2)
ALP LIVER SERPL-CCNC: 40 U/L
ALT SERPL-CCNC: 27 U/L
ANION GAP SERPL CALC-SCNC: 5 MMOL/L (ref 0–18)
APTT PPP: 59.7 SECONDS (ref 23.3–35.6)
APTT PPP: 65.5 SECONDS (ref 23.3–35.6)
AST SERPL-CCNC: 21 U/L (ref 15–37)
BILIRUB SERPL-MCNC: 0.5 MG/DL (ref 0.1–2)
BUN BLD-MCNC: 15 MG/DL (ref 7–18)
CALCIUM BLD-MCNC: 8.6 MG/DL (ref 8.5–10.1)
CHLORIDE SERPL-SCNC: 107 MMOL/L (ref 98–112)
CO2 SERPL-SCNC: 27 MMOL/L (ref 21–32)
CREAT BLD-MCNC: 1.4 MG/DL
ERYTHROCYTE [DISTWIDTH] IN BLOOD BY AUTOMATED COUNT: 12 %
GFR SERPLBLD BASED ON 1.73 SQ M-ARVRAT: 64 ML/MIN/1.73M2 (ref 60–?)
GLOBULIN PLAS-MCNC: 3.6 G/DL (ref 2.8–4.4)
GLUCOSE BLD-MCNC: 126 MG/DL (ref 70–99)
HCT VFR BLD AUTO: 38.4 %
HGB BLD-MCNC: 13.1 G/DL
MAGNESIUM SERPL-MCNC: 1.9 MG/DL (ref 1.6–2.6)
MCH RBC QN AUTO: 28.9 PG (ref 26–34)
MCHC RBC AUTO-ENTMCNC: 34.1 G/DL (ref 31–37)
MCV RBC AUTO: 84.8 FL
OSMOLALITY SERPL CALC.SUM OF ELEC: 290 MOSM/KG (ref 275–295)
PHOSPHATE SERPL-MCNC: 2.9 MG/DL (ref 2.5–4.9)
PLATELET # BLD AUTO: 145 10(3)UL (ref 150–450)
POTASSIUM SERPL-SCNC: 3.8 MMOL/L (ref 3.5–5.1)
PROCALCITONIN SERPL-MCNC: 0.07 NG/ML (ref ?–0.16)
PROT SERPL-MCNC: 6.2 G/DL (ref 6.4–8.2)
RBC # BLD AUTO: 4.53 X10(6)UL
SODIUM SERPL-SCNC: 139 MMOL/L (ref 136–145)
WBC # BLD AUTO: 8 X10(3) UL (ref 4–11)

## 2022-12-27 PROCEDURE — 99232 SBSQ HOSP IP/OBS MODERATE 35: CPT | Performed by: INTERNAL MEDICINE

## 2022-12-27 PROCEDURE — 93306 TTE W/DOPPLER COMPLETE: CPT | Performed by: INTERNAL MEDICINE

## 2022-12-27 RX ORDER — HYDROCODONE BITARTRATE AND ACETAMINOPHEN 5; 325 MG/1; MG/1
1 TABLET ORAL EVERY 4 HOURS PRN
Status: DISCONTINUED | OUTPATIENT
Start: 2022-12-27 | End: 2022-12-29

## 2022-12-27 RX ORDER — HYDROCODONE BITARTRATE AND ACETAMINOPHEN 5; 325 MG/1; MG/1
2 TABLET ORAL EVERY 4 HOURS PRN
Status: DISCONTINUED | OUTPATIENT
Start: 2022-12-27 | End: 2022-12-29

## 2022-12-27 RX ORDER — AMLODIPINE BESYLATE 5 MG/1
5 TABLET ORAL ONCE
Status: COMPLETED | OUTPATIENT
Start: 2022-12-27 | End: 2022-12-27

## 2022-12-27 RX ORDER — AMLODIPINE BESYLATE 5 MG/1
10 TABLET ORAL DAILY
Status: DISCONTINUED | OUTPATIENT
Start: 2022-12-28 | End: 2022-12-29

## 2022-12-27 NOTE — PLAN OF CARE
RN SHIFT NOTE    Assumed care of pt at 1430. Received report from Brooklyn Dawkins RN. Pt denies pain at this time. Patient is alert and oriented x 4 (Reminders and reorientation completed). Patient is NSR/ST on tele, S1 and S2 present and pt denies cardiac symptoms. Lung sounds diminished. Abdomen soft and round. Last BM on 12/27. No edema. Steri-strips over multiple incision sites to Left upper shoulder for prior shoulder surgery on 12/15. Heparin gtt running at 21 ml/hr. Tolerating medication and care needs have been met at this time.      POC: Heparin gtt, transition to DOAC in AM, monitor BP       Problem: Patient/Family Goals  Goal: Patient/Family Long Term Goal  Description: Patient's Long Term Goal: Go Home    Interventions:  - Manage blood pressure to reduce HTN  - Transition to oral anticoagulation  - See additional Care Plan goals for specific interventions  Outcome: Progressing  Goal: Patient/Family Short Term Goal  Description: Patient's Short Term Goal: Discontinue need for IV anticoagulation    Interventions:   - Evaluate safety for transition to oral AC  - See additional Care Plan goals for specific interventions  Outcome: Progressing     Problem: PAIN - ADULT  Goal: Verbalizes/displays adequate comfort level or patient's stated pain goal  Description: INTERVENTIONS:  - Encourage pt to monitor pain and request assistance  - Assess pain using appropriate pain scale  - Administer analgesics based on type and severity of pain and evaluate response  - Implement non-pharmacological measures as appropriate and evaluate response  - Consider cultural and social influences on pain and pain management  - Manage/alleviate anxiety  - Utilize distraction and/or relaxation techniques  - Monitor for opioid side effects  - Notify MD/LIP if interventions unsuccessful or patient reports new pain  - Anticipate increased pain with activity and pre-medicate as appropriate  Outcome: Progressing     Problem: RESPIRATORY - ADULT  Goal: Achieves optimal ventilation and oxygenation  Description: INTERVENTIONS:  - Assess for changes in respiratory status  - Assess for changes in mentation and behavior  - Position to facilitate oxygenation and minimize respiratory effort  - Oxygen supplementation based on oxygen saturation or ABGs  - Provide Smoking Cessation handout, if applicable  - Encourage broncho-pulmonary hygiene including cough, deep breathe, Incentive Spirometry  - Assess the need for suctioning and perform as needed  - Assess and instruct to report SOB or any respiratory difficulty  - Respiratory Therapy support as indicated  - Manage/alleviate anxiety  - Monitor for signs/symptoms of CO2 retention  Outcome: Progressing     Problem: SKIN/TISSUE INTEGRITY - ADULT  Goal: Incision(s), wounds(s) or drain site(s) healing without S/S of infection  Description: INTERVENTIONS:  - Assess and document risk factors for pressure ulcer development  - Assess and document skin integrity  - Assess and document dressing/incision, wound bed, drain sites and surrounding tissue  - Implement wound care per orders  - Initiate isolation precautions as appropriate  - Initiate Pressure Ulcer prevention bundle as indicated  Outcome: Progressing     Problem: Peripheral vascular status not within defined limits  Goal: Pt will have peripheral vascular status adequate for disch  Outcome: Progressing

## 2022-12-27 NOTE — PLAN OF CARE
Assumed care of patient about [phone]. Patient is alert and orientated. Complaining of mild chest discomfort but states it has improved from when he first come in and declined pain meds for now. O2 saturations mid 90's on RA with RR in mid 20's. Appears to have nonlabored breathing. Heparin running per PE protocol with PTT obtained per order. US venous doppler and echo pending. [facility] said no need for tests to be stat, ok if they are not completed until tomorrow. [facility] notified of HTN waiting for callback with orders. POC discussed with patient. Will continue to monitor.    Problem: PAIN - ADULT  Goal: Verbalizes/displays adequate comfort level or patient's stated pain goal  Description: INTERVENTIONS:  - Encourage pt to monitor pain and request assistance  - Assess pain using appropriate pain scale  - Administer analgesics based on type and severity of pain and evaluate response  - Implement non-pharmacological measures as appropriate and evaluate response  - Consider cultural and social influences on pain and pain management  - Manage/alleviate anxiety  - Utilize distraction and/or relaxation techniques  - Monitor for opioid side effects  - Notify MD/LIP if interventions unsuccessful or patient reports new pain  - Anticipate increased pain with activity and pre-medicate as appropriate  Outcome: Progressing     Problem: RESPIRATORY - ADULT  Goal: Achieves optimal ventilation and oxygenation  Description: INTERVENTIONS:  - Assess for changes in respiratory status  - Assess for changes in mentation and behavior  - Position to facilitate oxygenation and minimize respiratory effort  - Oxygen supplementation based on oxygen saturation or ABGs  - Provide Smoking Cessation handout, if applicable  - Encourage broncho-pulmonary hygiene including cough, deep breathe, Incentive Spirometry  - Assess the need for suctioning and perform as needed  - Assess and instruct to report SOB or any respiratory difficulty  - Respiratory Therapy support as indicated  - Manage/alleviate anxiety  - Monitor for signs/symptoms of CO2 retention  Outcome: Progressing

## 2022-12-27 NOTE — PLAN OF CARE
Patient received aox4 and heparin gtt running. L arm is in a brace from a surgery prior to admission but has good circulation and patient is able to move it. ST on monitor. SBP and DBP elevated prior to shift so receiving RN spoke with Tristen CAPONE; hydralazine iv prn ordered, amlodipine and losartan started. Tylenol given for pain control and fever; ice packs utilized as well to bring temperature down. Dr Morena Medina rounded on patient this evening, held home irbesartan, adjusted amlodipine schedule, and added labetalol IV prn to maintain BP <180/110.      Problem: Patient/Family Goals  Goal: Patient/Family Long Term Goal    Interventions:  - See additional Care Plan goals for specific interventions  Outcome: Progressing  Goal: Patient/Family Short Term Goal    Interventions:   - See additional Care Plan goals for specific interventions  Outcome: Progressing     Problem: PAIN - ADULT  Goal: Verbalizes/displays adequate comfort level or patient's stated pain goal  Description: INTERVENTIONS:  - Encourage pt to monitor pain and request assistance  - Assess pain using appropriate pain scale  - Administer analgesics based on type and severity of pain and evaluate response  - Implement non-pharmacological measures as appropriate and evaluate response  - Consider cultural and social influences on pain and pain management  - Manage/alleviate anxiety  - Utilize distraction and/or relaxation techniques  - Monitor for opioid side effects  - Notify MD/LIP if interventions unsuccessful or patient reports new pain  - Anticipate increased pain with activity and pre-medicate as appropriate  12/26/2022 2042 by Allan Moreno RN  Outcome: Progressing  12/26/2022 2036 by Allan Moreno RN  Outcome: Progressing     Problem: RESPIRATORY - ADULT  Goal: Achieves optimal ventilation and oxygenation  Description: INTERVENTIONS:  - Assess for changes in respiratory status  - Assess for changes in mentation and behavior  - Position to facilitate oxygenation and minimize respiratory effort  - Oxygen supplementation based on oxygen saturation or ABGs  - Provide Smoking Cessation handout, if applicable  - Encourage broncho-pulmonary hygiene including cough, deep breathe, Incentive Spirometry  - Assess the need for suctioning and perform as needed  - Assess and instruct to report SOB or any respiratory difficulty  - Respiratory Therapy support as indicated  - Manage/alleviate anxiety  - Monitor for signs/symptoms of CO2 retention  12/26/2022 2042 by Iain Grimes RN  Outcome: Progressing  12/26/2022 2036 by Iain Grimes RN  Outcome: Progressing     Problem: SKIN/TISSUE INTEGRITY - ADULT  Goal: Incision(s), wounds(s) or drain site(s) healing without S/S of infection  Description: INTERVENTIONS:  - Assess and document risk factors for pressure ulcer development  - Assess and document skin integrity  - Assess and document dressing/incision, wound bed, drain sites and surrounding tissue  - Implement wound care per orders  - Initiate isolation precautions as appropriate  - Initiate Pressure Ulcer prevention bundle as indicated  Outcome: Progressing     Problem: Peripheral vascular status not within defined limits  Goal: Pt will have peripheral vascular status adequate for disch  Outcome: Progressing

## 2022-12-27 NOTE — PLAN OF CARE
Patient care assumed at approximately 0730. Heparin gtt handoff completed with night RN at bedside report. Pt uses CPAP at [facility]. A&Ox4. SR/ST on tele. Pulses palpable. R lung sounds diminished. Saturating well on room air. Ambulates in room, tolerates well. Echo completed this AM.     Approved for transfer by MDs.  Report given to receiving RN José.     Problem: Patient/Family Goals  Goal: Patient/Family Long Term Goal  Description: Patient's Long Term Goal: Go Home    Interventions:  - Manage blood pressure to reduce HTN  - Transition to oral anticoagulation  - See additional Care Plan goals for specific interventions  Outcome: Progressing  Goal: Patient/Family Short Term Goal  Description: Patient's Short Term Goal: Discontinue need for IV anticoagulation    Interventions:   - Evaluate safety for transition to oral AC  - See additional Care Plan goals for specific interventions  Outcome: Progressing     Problem: PAIN - ADULT  Goal: Verbalizes/displays adequate comfort level or patient's stated pain goal  Description: INTERVENTIONS:  - Encourage pt to monitor pain and request assistance  - Assess pain using appropriate pain scale  - Administer analgesics based on type and severity of pain and evaluate response  - Implement non-pharmacological measures as appropriate and evaluate response  - Consider cultural and social influences on pain and pain management  - Manage/alleviate anxiety  - Utilize distraction and/or relaxation techniques  - Monitor for opioid side effects  - Notify MD/LIP if interventions unsuccessful or patient reports new pain  - Anticipate increased pain with activity and pre-medicate as appropriate  Outcome: Progressing     Problem: RESPIRATORY - ADULT  Goal: Achieves optimal ventilation and oxygenation  Description: INTERVENTIONS:  - Assess for changes in respiratory status  - Assess for changes in mentation and behavior  - Position to facilitate oxygenation and minimize respiratory effort  - Oxygen supplementation based on oxygen saturation or ABGs  - Provide Smoking Cessation handout, if applicable  - Encourage broncho-pulmonary hygiene including cough, deep breathe, Incentive Spirometry  - Assess the need for suctioning and perform as needed  - Assess and instruct to report SOB or any respiratory difficulty  - Respiratory Therapy support as indicated  - Manage/alleviate anxiety  - Monitor for signs/symptoms of CO2 retention  Outcome: Progressing     Problem: SKIN/TISSUE INTEGRITY - ADULT  Goal: Incision(s), wounds(s) or drain site(s) healing without S/S of infection  Description: INTERVENTIONS:  - Assess and document risk factors for pressure ulcer development  - Assess and document skin integrity  - Assess and document dressing/incision, wound bed, drain sites and surrounding tissue  - Implement wound care per orders  - Initiate isolation precautions as appropriate  - Initiate Pressure Ulcer prevention bundle as indicated  Outcome: Progressing     Problem: Peripheral vascular status not within defined limits  Goal: Pt will have peripheral vascular status adequate for disch  Outcome: Progressing

## 2022-12-28 ENCOUNTER — TELEPHONE (OUTPATIENT)
Dept: PHYSICAL THERAPY | Facility: HOSPITAL | Age: 42
End: 2022-12-28

## 2022-12-28 LAB
APTT PPP: 57.7 SECONDS (ref 23.3–35.6)
APTT PPP: 61.3 SECONDS (ref 23.3–35.6)
ATRIAL RATE: 105 BPM
ATRIAL RATE: 98 BPM
BASOPHILS # BLD AUTO: 0.02 X10(3) UL (ref 0–0.2)
BASOPHILS NFR BLD AUTO: 0.3 %
EOSINOPHIL # BLD AUTO: 0.02 X10(3) UL (ref 0–0.7)
EOSINOPHIL NFR BLD AUTO: 0.3 %
ERYTHROCYTE [DISTWIDTH] IN BLOOD BY AUTOMATED COUNT: 12 %
HCT VFR BLD AUTO: 36.3 %
HGB BLD-MCNC: 12.4 G/DL
IMM GRANULOCYTES # BLD AUTO: 0.03 X10(3) UL (ref 0–1)
IMM GRANULOCYTES NFR BLD: 0.5 %
LYMPHOCYTES # BLD AUTO: 0.85 X10(3) UL (ref 1–4)
LYMPHOCYTES NFR BLD AUTO: 13.4 %
MCH RBC QN AUTO: 28.2 PG (ref 26–34)
MCHC RBC AUTO-ENTMCNC: 34.2 G/DL (ref 31–37)
MCV RBC AUTO: 82.5 FL
MONOCYTES # BLD AUTO: 0.6 X10(3) UL (ref 0.1–1)
MONOCYTES NFR BLD AUTO: 9.4 %
NEUTROPHILS # BLD AUTO: 4.83 X10 (3) UL (ref 1.5–7.7)
NEUTROPHILS # BLD AUTO: 4.83 X10(3) UL (ref 1.5–7.7)
NEUTROPHILS NFR BLD AUTO: 76.1 %
P AXIS: 26 DEGREES
P AXIS: 31 DEGREES
P-R INTERVAL: 166 MS
P-R INTERVAL: 170 MS
PLATELET # BLD AUTO: 166 10(3)UL (ref 150–450)
Q-T INTERVAL: 338 MS
Q-T INTERVAL: 340 MS
QRS DURATION: 84 MS
QRS DURATION: 84 MS
QTC CALCULATION (BEZET): 434 MS
QTC CALCULATION (BEZET): 446 MS
R AXIS: -6 DEGREES
R AXIS: 2 DEGREES
RBC # BLD AUTO: 4.4 X10(6)UL
T AXIS: 1 DEGREES
T AXIS: 10 DEGREES
VENTRICULAR RATE: 105 BPM
VENTRICULAR RATE: 98 BPM
WBC # BLD AUTO: 6.4 X10(3) UL (ref 4–11)

## 2022-12-28 PROCEDURE — 99232 SBSQ HOSP IP/OBS MODERATE 35: CPT | Performed by: INTERNAL MEDICINE

## 2022-12-28 NOTE — DISCHARGE INSTRUCTIONS
No motrin, aleve pain meds  f/u w/ ortho MD  Will need f/u w/ heme/ onc MD as outpt   Will need repeat ct chest in about 1 month - 6 weeks   per pulm

## 2022-12-28 NOTE — CM/SW NOTE
Spoke to meds to beds regarding Elliquis cost. It will be free for the first month and then $10 after that.      HORACE Perez, [facility]  Discharge [address].

## 2022-12-28 NOTE — PLAN OF CARE
Assumed care for pt at 19:30 on 12/27    A&Ox4. Reports R chest pain more towards center of chest on inhalation when laying down. No pain when sitting up (HOB > 60deg). EKG performed. Prn tramadol given. Midnight /109, prn hydralazine given- 161/91 on recheck. Pt concerned feeling feverish, 103 axillary 103.2 orally. Tylenol given. Dr. Acacia Farias notified, CBC and blood cultures ordered for AM draw. NSR/sinus tach on tele up to 120s. Other VSS on RA. Cpap HS. Continent of B&B, BM 12/27. Up ad jimmie. Heparin gtt infusing. Left shoulder steristrips x5 c/d/i. Plan: Monitor BP, PTT/CBC/bloodCx AM draw, amlodipine 10mg to start     Call light in reach. Able to make needs known.

## 2022-12-28 NOTE — PLAN OF CARE
RN SHIFT NOTE    Assumed care of pt at 0700. Patient had complained of right chest pain this morning, but had resolved after patient had increased HOB to 90 degrees. Patient is NSR on tele, S1 and S2 present and pt denies cardiac symptoms. Right lung sounds diminished. Left lung sounds clear, but diminished. No edema. Abdomen soft and round. Last BM on 12/27. Small incisions with steri strips to the left upper shoulder noted. PTT sub-therapeutic in AM at 57.7. Hep gtt increased from 21 ml/hr to 23 ml/hr per protocol. Next PTT due at 1313. Tolerating medication and care needs have been met at this time. POC: Transition to [address] off heparin when approved, price eliquis, Monitor BP, monitor temps, Blood cultures pending.      Problem: Patient/Family Goals  Goal: Patient/Family Long Term Goal  Description: Patient's Long Term Goal: Go Home    Interventions:  - Manage blood pressure to reduce HTN  - Transition to oral anticoagulation  - See additional Care Plan goals for specific interventions  12/28/2022 1040 by Brittany Singleton RN  Outcome: Progressing  12/28/2022 0919 by Brittany Singleton RN  Outcome: Progressing  Goal: Patient/Family Short Term Goal  Description: Patient's Short Term Goal: Discontinue need for IV anticoagulation    Interventions:   - Evaluate safety for transition to oral [facility]  - See additional Care Plan goals for specific interventions  12/28/2022 1040 by Brittany Singleton RN  Outcome: Progressing  12/28/2022 0919 by Brittany Singleton RN  Outcome: Progressing     Problem: PAIN - ADULT  Goal: Verbalizes/displays adequate comfort level or patient's stated pain goal  Description: INTERVENTIONS:  - Encourage pt to monitor pain and request assistance  - Assess pain using appropriate pain scale  - Administer analgesics based on type and severity of pain and evaluate response  - Implement non-pharmacological measures as appropriate and evaluate response  - Consider cultural and social influences on pain and pain management  - Manage/alleviate anxiety  - Utilize distraction and/or relaxation techniques  - Monitor for opioid side effects  - Notify MD/LIP if interventions unsuccessful or patient reports new pain  - Anticipate increased pain with activity and pre-medicate as appropriate  12/28/2022 1040 by Dilma Brooks RN  Outcome: Progressing  12/28/2022 0919 by Dilma Brooks RN  Outcome: Progressing     Problem: RESPIRATORY - ADULT  Goal: Achieves optimal ventilation and oxygenation  Description: INTERVENTIONS:  - Assess for changes in respiratory status  - Assess for changes in mentation and behavior  - Position to facilitate oxygenation and minimize respiratory effort  - Oxygen supplementation based on oxygen saturation or ABGs  - Provide Smoking Cessation handout, if applicable  - Encourage broncho-pulmonary hygiene including cough, deep breathe, Incentive Spirometry  - Assess the need for suctioning and perform as needed  - Assess and instruct to report SOB or any respiratory difficulty  - Respiratory Therapy support as indicated  - Manage/alleviate anxiety  - Monitor for signs/symptoms of CO2 retention  12/28/2022 1040 by Dilma Brooks RN  Outcome: Progressing  12/28/2022 0919 by Dilma Brooks RN  Outcome: Progressing     Problem: SKIN/TISSUE INTEGRITY - ADULT  Goal: Incision(s), wounds(s) or drain site(s) healing without S/S of infection  Description: INTERVENTIONS:  - Assess and document risk factors for pressure ulcer development  - Assess and document skin integrity  - Assess and document dressing/incision, wound bed, drain sites and surrounding tissue  - Implement wound care per orders  - Initiate isolation precautions as appropriate  - Initiate Pressure Ulcer prevention bundle as indicated  12/28/2022 1040 by Dilma Brooks RN  Outcome: Progressing  12/28/2022 0919 by Dilma Brooks RN  Outcome: Progressing     Problem: Peripheral vascular status not within defined limits  Goal: Pt will have peripheral vascular status adequate for disch  12/28/2022 1040 by Julia Hare, RN  Outcome: Progressing  12/28/2022 0919 by Julia Hare, RN  Outcome: Progressing

## 2022-12-29 ENCOUNTER — APPOINTMENT (OUTPATIENT)
Dept: PHYSICAL THERAPY | Facility: HOSPITAL | Age: 42
End: 2022-12-29
Attending: ORTHOPAEDIC SURGERY
Payer: COMMERCIAL

## 2022-12-29 VITALS
WEIGHT: 263.69 LBS | HEART RATE: 97 BPM | SYSTOLIC BLOOD PRESSURE: 136 MMHG | OXYGEN SATURATION: 98 % | RESPIRATION RATE: 18 BRPM | BODY MASS INDEX: 32 KG/M2 | TEMPERATURE: 98 F | DIASTOLIC BLOOD PRESSURE: 80 MMHG

## 2022-12-29 LAB
BASOPHILS # BLD AUTO: 0.02 X10(3) UL (ref 0–0.2)
BASOPHILS NFR BLD AUTO: 0.4 %
EOSINOPHIL # BLD AUTO: 0.07 X10(3) UL (ref 0–0.7)
EOSINOPHIL NFR BLD AUTO: 1.5 %
ERYTHROCYTE [DISTWIDTH] IN BLOOD BY AUTOMATED COUNT: 12.2 %
HCT VFR BLD AUTO: 35.2 %
HGB BLD-MCNC: 12.2 G/DL
IMM GRANULOCYTES # BLD AUTO: 0.03 X10(3) UL (ref 0–1)
IMM GRANULOCYTES NFR BLD: 0.6 %
LYMPHOCYTES # BLD AUTO: 0.62 X10(3) UL (ref 1–4)
LYMPHOCYTES NFR BLD AUTO: 12.9 %
MCH RBC QN AUTO: 29 PG (ref 26–34)
MCHC RBC AUTO-ENTMCNC: 34.7 G/DL (ref 31–37)
MCV RBC AUTO: 83.8 FL
MONOCYTES # BLD AUTO: 0.41 X10(3) UL (ref 0.1–1)
MONOCYTES NFR BLD AUTO: 8.5 %
NEUTROPHILS # BLD AUTO: 3.67 X10 (3) UL (ref 1.5–7.7)
NEUTROPHILS # BLD AUTO: 3.67 X10(3) UL (ref 1.5–7.7)
NEUTROPHILS NFR BLD AUTO: 76.1 %
PLATELET # BLD AUTO: 189 10(3)UL (ref 150–450)
PROCALCITONIN SERPL-MCNC: 0.05 NG/ML (ref ?–0.16)
RBC # BLD AUTO: 4.2 X10(6)UL
WBC # BLD AUTO: 4.8 X10(3) UL (ref 4–11)

## 2022-12-29 PROCEDURE — 99239 HOSP IP/OBS DSCHRG MGMT >30: CPT | Performed by: INTERNAL MEDICINE

## 2022-12-29 RX ORDER — AMLODIPINE BESYLATE 10 MG/1
10 TABLET ORAL DAILY
Qty: 30 TABLET | Refills: 0 | Status: SHIPPED | OUTPATIENT
Start: 2022-12-29

## 2022-12-29 NOTE — PLAN OF CARE
Assumed care for pt at 19:30 on 12/28    A&Ox4. Tylenol given for chest discomfort when lying down. Denies LISET. VSS on RA. CPAP HS.  NSR/ST on tele. Eliquis given, heparin turned off. Continent of B&B. Up ad jimmie. Left shoulder steri strips x5 c/d/i. Plan: Procal and CBC AM draw, BCx pending, monitor for fevers, Meds to Bed Eliquis delivery. Anticipated discharge    Call light in reach, able to make needs known.

## 2022-12-29 NOTE — PROGRESS NOTES
Progress Note     Nieves Dasilva Patient Status:  Inpatient    1980 MRN [id number]   [facility] 8NE-A Attending Hassel Goldberg, MD   Hosp Day # 3 PCP Andi Esqueda MD   [facility] [address]  [facility] [address]  ?  22  ? Re: Nieves Dasilva  ? To Whom It May Concern:    Nieves Dasilva was admitted to [facility] from 2022 to 22. Please excuse Nieves Dasilva from attending work for these days. The patient may return to work on 2023  Without any restrictions:. .    Patient will follow up with their primary care physician upon discharge. Further restrictions and work excuse will be based on primary care physician's evaluation. ?   Thank you,    LIBORIO Jernigan  [facility]

## 2022-12-29 NOTE — PLAN OF CARE
Pt and VS remained stable throughout shift. Denies CP/SOB. sats maintained in 90's on RA. TELE: SR   meds 2 beds notified regarding discharge. Meds delivered to patients bedside. POC     Ok to discharge per cards APN. AVS and Rx given. Pt teaching regarding med regimen and outpt f/u  Verbalized understanding off floor to [facility] via w/c by floor staff stable at discharge.

## 2022-12-30 ENCOUNTER — PATIENT OUTREACH (OUTPATIENT)
Dept: CASE MANAGEMENT | Age: 42
End: 2022-12-30

## 2022-12-30 DIAGNOSIS — Z02.9 ENCOUNTERS FOR UNSPECIFIED ADMINISTRATIVE PURPOSE: ICD-10-CM

## 2022-12-30 NOTE — PROGRESS NOTES
[facility] contact information provided as well as [facility] office number, [phone].

## 2022-12-30 NOTE — PROGRESS NOTES
[facility] for post hospital follow up. [facility] contact information provided as well as [facility] office number, [phone].

## 2023-01-03 ENCOUNTER — TELEPHONE (OUTPATIENT)
Dept: ORTHOPEDICS CLINIC | Facility: CLINIC | Age: 43
End: 2023-01-03

## 2023-01-04 ENCOUNTER — OFFICE VISIT (OUTPATIENT)
Dept: INTERNAL MEDICINE CLINIC | Facility: CLINIC | Age: 43
End: 2023-01-04
Payer: COMMERCIAL

## 2023-01-04 ENCOUNTER — OFFICE VISIT (OUTPATIENT)
Dept: PHYSICAL THERAPY | Facility: HOSPITAL | Age: 43
End: 2023-01-04
Attending: ORTHOPAEDIC SURGERY
Payer: COMMERCIAL

## 2023-01-04 VITALS
WEIGHT: 261 LBS | HEART RATE: 95 BPM | BODY MASS INDEX: 31.78 KG/M2 | DIASTOLIC BLOOD PRESSURE: 90 MMHG | SYSTOLIC BLOOD PRESSURE: 124 MMHG | TEMPERATURE: 96 F | HEIGHT: 76 IN | OXYGEN SATURATION: 95 % | RESPIRATION RATE: 16 BRPM

## 2023-01-04 DIAGNOSIS — R50.9 FEBRILE ILLNESS: ICD-10-CM

## 2023-01-04 DIAGNOSIS — N18.2 STAGE 2 CHRONIC KIDNEY DISEASE: ICD-10-CM

## 2023-01-04 DIAGNOSIS — Z98.890 S/P ARTHROSCOPY OF LEFT SHOULDER: ICD-10-CM

## 2023-01-04 DIAGNOSIS — I10 PRIMARY HYPERTENSION: ICD-10-CM

## 2023-01-04 DIAGNOSIS — R07.9 RIGHT-SIDED CHEST PAIN: Primary | ICD-10-CM

## 2023-01-04 DIAGNOSIS — I26.99 OTHER ACUTE PULMONARY EMBOLISM WITHOUT ACUTE COR PULMONALE (HCC): ICD-10-CM

## 2023-01-04 PROCEDURE — 3074F SYST BP LT 130 MM HG: CPT | Performed by: NURSE PRACTITIONER

## 2023-01-04 PROCEDURE — 97140 MANUAL THERAPY 1/> REGIONS: CPT

## 2023-01-04 PROCEDURE — 99495 TRANSJ CARE MGMT MOD F2F 14D: CPT | Performed by: NURSE PRACTITIONER

## 2023-01-04 PROCEDURE — 3080F DIAST BP >= 90 MM HG: CPT | Performed by: NURSE PRACTITIONER

## 2023-01-04 PROCEDURE — 3008F BODY MASS INDEX DOCD: CPT | Performed by: NURSE PRACTITIONER

## 2023-01-04 RX ORDER — MULTIVITAMIN WITH IRON
250 TABLET ORAL DAILY PRN
COMMUNITY

## 2023-01-04 RX ORDER — CHLORAL HYDRATE 500 MG
1000 CAPSULE ORAL DAILY PRN
COMMUNITY

## 2023-01-04 RX ORDER — ZINC SULFATE 50(220)MG
220 CAPSULE ORAL DAILY PRN
COMMUNITY

## 2023-01-06 ENCOUNTER — OFFICE VISIT (OUTPATIENT)
Dept: PHYSICAL THERAPY | Facility: HOSPITAL | Age: 43
End: 2023-01-06
Attending: ORTHOPAEDIC SURGERY
Payer: COMMERCIAL

## 2023-01-06 PROCEDURE — 97140 MANUAL THERAPY 1/> REGIONS: CPT

## 2023-01-06 PROCEDURE — 97110 THERAPEUTIC EXERCISES: CPT

## 2023-01-09 ENCOUNTER — OFFICE VISIT (OUTPATIENT)
Dept: PHYSICAL THERAPY | Facility: HOSPITAL | Age: 43
End: 2023-01-09
Attending: ORTHOPAEDIC SURGERY
Payer: COMMERCIAL

## 2023-01-09 PROCEDURE — 97112 NEUROMUSCULAR REEDUCATION: CPT

## 2023-01-09 PROCEDURE — 97140 MANUAL THERAPY 1/> REGIONS: CPT

## 2023-01-10 PROBLEM — N18.31 STAGE 3A CHRONIC KIDNEY DISEASE (HCC): Status: ACTIVE | Noted: 2023-01-10

## 2023-01-10 PROBLEM — N18.31 STAGE 3A CHRONIC KIDNEY DISEASE (HCC): Status: RESOLVED | Noted: 2023-01-10 | Resolved: 2023-01-10

## 2023-01-12 ENCOUNTER — OFFICE VISIT (OUTPATIENT)
Dept: PHYSICAL THERAPY | Facility: HOSPITAL | Age: 43
End: 2023-01-12
Attending: ORTHOPAEDIC SURGERY
Payer: COMMERCIAL

## 2023-01-12 PROCEDURE — 97140 MANUAL THERAPY 1/> REGIONS: CPT

## 2023-01-12 PROCEDURE — 97110 THERAPEUTIC EXERCISES: CPT

## 2023-01-13 NOTE — PROGRESS NOTES
Multiple attempts to reach pt and messages left with no return call. Patient went in for HFU appt with [facility] on 1/4/23. Encounter closing.

## 2023-01-16 ENCOUNTER — OFFICE VISIT (OUTPATIENT)
Dept: PHYSICAL THERAPY | Facility: HOSPITAL | Age: 43
End: 2023-01-16
Attending: ORTHOPAEDIC SURGERY
Payer: COMMERCIAL

## 2023-01-16 PROCEDURE — 97110 THERAPEUTIC EXERCISES: CPT

## 2023-01-16 PROCEDURE — 97140 MANUAL THERAPY 1/> REGIONS: CPT

## 2023-01-17 ENCOUNTER — OFFICE VISIT (OUTPATIENT)
Dept: ORTHOPEDICS CLINIC | Facility: CLINIC | Age: 43
End: 2023-01-17
Payer: COMMERCIAL

## 2023-01-17 DIAGNOSIS — Z98.890 S/P ARTHROSCOPY OF SHOULDER: Primary | ICD-10-CM

## 2023-01-17 PROCEDURE — 99024 POSTOP FOLLOW-UP VISIT: CPT | Performed by: PHYSICIAN ASSISTANT

## 2023-01-19 ENCOUNTER — OFFICE VISIT (OUTPATIENT)
Dept: PHYSICAL THERAPY | Facility: HOSPITAL | Age: 43
End: 2023-01-19
Attending: ORTHOPAEDIC SURGERY
Payer: COMMERCIAL

## 2023-01-19 PROCEDURE — 97110 THERAPEUTIC EXERCISES: CPT

## 2023-01-19 RX ORDER — IRBESARTAN 300 MG/1
TABLET ORAL
Qty: 90 TABLET | Refills: 1 | Status: SHIPPED | OUTPATIENT
Start: 2023-01-19

## 2023-01-19 NOTE — TELEPHONE ENCOUNTER
Hypertension Medications Protocol Passed 01/19/2023 10:27 AM   Protocol Details  CMP or BMP in past 12 months    Last serum creatinine< 2.0    Appointment in past 6 or next 3 months        LOV 9/9/22 dr Es Abdi     LAST LAB  12/17/22     LAST RX  6/17/22 90 with 1     Next OV   Future Appointments   Date Time Provider Krystal Kathya   1/23/2023  2:30 PM Rhea Garcia MD [facility] HEM ONC [facility] AT [facility]   1/23/2023  4:45 PM Shavonne Stoner, PT [facility] PHYS [address]   1/26/2023  8:15 AM Shavonne Stoner, PT [facility] PHYS [facility] AT [facility] AT [facility]   1/26/2023 10:40 AM WHITNEY Catherine [facility] [address] [facility]   1/30/2023  4:45 PM Jacqui Almodovar, PT [address] [facility] PHYS TH [facility] AT [facility]   2/1/2023  4:15 PM Jacqui Almodovar, PT [address] [facility] PHYS TH [facility] AT [facility]   2/6/2023  8:45 AM Jacqui Almodovar, PT [facility] PHYS [facility] AT [facility] AT [facility]   2/8/2023  8:45 AM Jacqui Almodovar, PT [address] [facility] PHYS [facility]TY [facility] AT [facility] AT [facility]   2/13/2023  8:45 AM Jacqui Almodovar, PT [address] [facility] PHYS [address]   3/15/2023  9:00 AM Christina Moses MD [facility]O [address] [id number]   5/10/2023  3:00 PM Ezio Gomes MD EN[facility] [place]         PROTOCOL pass

## 2023-01-23 ENCOUNTER — OFFICE VISIT (OUTPATIENT)
Dept: HEMATOLOGY/ONCOLOGY | Facility: HOSPITAL | Age: 43
End: 2023-01-23
Attending: INTERNAL MEDICINE
Payer: COMMERCIAL

## 2023-01-23 ENCOUNTER — OFFICE VISIT (OUTPATIENT)
Dept: PHYSICAL THERAPY | Facility: HOSPITAL | Age: 43
End: 2023-01-23
Attending: ORTHOPAEDIC SURGERY
Payer: COMMERCIAL

## 2023-01-23 VITALS
WEIGHT: 258.63 LBS | SYSTOLIC BLOOD PRESSURE: 137 MMHG | DIASTOLIC BLOOD PRESSURE: 85 MMHG | HEART RATE: 84 BPM | TEMPERATURE: 98 F | RESPIRATION RATE: 18 BRPM | OXYGEN SATURATION: 100 % | BODY MASS INDEX: 31 KG/M2

## 2023-01-23 DIAGNOSIS — Z80.0 FAMILY HISTORY OF COLON CANCER IN FATHER: ICD-10-CM

## 2023-01-23 DIAGNOSIS — J98.59 MEDIASTINAL MASS: ICD-10-CM

## 2023-01-23 DIAGNOSIS — I26.99 OTHER ACUTE PULMONARY EMBOLISM WITHOUT ACUTE COR PULMONALE (HCC): Primary | ICD-10-CM

## 2023-01-23 PROCEDURE — 97110 THERAPEUTIC EXERCISES: CPT

## 2023-01-23 PROCEDURE — 97140 MANUAL THERAPY 1/> REGIONS: CPT

## 2023-01-23 PROCEDURE — 99245 OFF/OP CONSLTJ NEW/EST HI 55: CPT | Performed by: INTERNAL MEDICINE

## 2023-01-23 NOTE — PROGRESS NOTES
Education Record    Learner:  Patient    Disease / Diagnosis: PE    Barriers / Limitations:  None   Comments:    Method:  Discussion   Comments:    General Topics:  Plan of care reviewed   Comments:    Outcome:  Shows understanding   Comments: last seen in 2021. Pt had shoulder surgery on 12/15. Pt states he had pain and went to ER and was dx with PE. Pt taking eliquis as directed without complications. Pt states his pain is better. Pt given order for CT scan along with oral contrast and instructions.

## 2023-01-26 ENCOUNTER — OFFICE VISIT (OUTPATIENT)
Dept: INTERNAL MEDICINE CLINIC | Facility: CLINIC | Age: 43
End: 2023-01-26
Payer: COMMERCIAL

## 2023-01-26 ENCOUNTER — TELEPHONE (OUTPATIENT)
Dept: PHYSICAL THERAPY | Facility: HOSPITAL | Age: 43
End: 2023-01-26

## 2023-01-26 ENCOUNTER — OFFICE VISIT (OUTPATIENT)
Dept: PHYSICAL THERAPY | Facility: HOSPITAL | Age: 43
End: 2023-01-26
Attending: ORTHOPAEDIC SURGERY
Payer: COMMERCIAL

## 2023-01-26 VITALS
SYSTOLIC BLOOD PRESSURE: 124 MMHG | DIASTOLIC BLOOD PRESSURE: 80 MMHG | HEIGHT: 76 IN | OXYGEN SATURATION: 97 % | HEART RATE: 104 BPM | TEMPERATURE: 99 F | WEIGHT: 258 LBS | BODY MASS INDEX: 31.42 KG/M2

## 2023-01-26 DIAGNOSIS — J98.59 MEDIASTINAL MASS: ICD-10-CM

## 2023-01-26 DIAGNOSIS — G47.33 OSA (OBSTRUCTIVE SLEEP APNEA): ICD-10-CM

## 2023-01-26 DIAGNOSIS — N18.2 STAGE 2 CHRONIC KIDNEY DISEASE: ICD-10-CM

## 2023-01-26 DIAGNOSIS — Z80.0 FAMILY HISTORY OF COLON CANCER IN FATHER: ICD-10-CM

## 2023-01-26 DIAGNOSIS — I10 PRIMARY HYPERTENSION: ICD-10-CM

## 2023-01-26 DIAGNOSIS — J45.20 MILD INTERMITTENT ASTHMA WITHOUT COMPLICATION: ICD-10-CM

## 2023-01-26 DIAGNOSIS — Z98.890 S/P ARTHROSCOPY OF LEFT SHOULDER: ICD-10-CM

## 2023-01-26 DIAGNOSIS — Z12.11 SCREEN FOR COLON CANCER: ICD-10-CM

## 2023-01-26 DIAGNOSIS — I26.99 OTHER ACUTE PULMONARY EMBOLISM WITHOUT ACUTE COR PULMONALE (HCC): Primary | ICD-10-CM

## 2023-01-26 PROBLEM — R07.9 RIGHT-SIDED CHEST PAIN: Status: RESOLVED | Noted: 2023-01-04 | Resolved: 2023-01-26

## 2023-01-26 PROBLEM — E87.6 HYPOKALEMIA: Status: RESOLVED | Noted: 2022-12-26 | Resolved: 2023-01-26

## 2023-01-26 PROCEDURE — 3008F BODY MASS INDEX DOCD: CPT | Performed by: NURSE PRACTITIONER

## 2023-01-26 PROCEDURE — 3074F SYST BP LT 130 MM HG: CPT | Performed by: NURSE PRACTITIONER

## 2023-01-26 PROCEDURE — 99214 OFFICE O/P EST MOD 30 MIN: CPT | Performed by: NURSE PRACTITIONER

## 2023-01-26 PROCEDURE — 3079F DIAST BP 80-89 MM HG: CPT | Performed by: NURSE PRACTITIONER

## 2023-01-27 ENCOUNTER — HOSPITAL ENCOUNTER (OUTPATIENT)
Dept: CT IMAGING | Age: 43
Discharge: HOME | End: 2023-01-27
Attending: INTERNAL MEDICINE
Payer: COMMERCIAL

## 2023-01-27 DIAGNOSIS — J98.59 MEDIASTINAL MASS: ICD-10-CM

## 2023-01-27 DIAGNOSIS — I26.99 OTHER ACUTE PULMONARY EMBOLISM WITHOUT ACUTE COR PULMONALE (HCC): ICD-10-CM

## 2023-01-27 PROCEDURE — 71260 CT THORAX DX C+: CPT | Performed by: INTERNAL MEDICINE

## 2023-01-27 PROCEDURE — 74177 CT ABD & PELVIS W/CONTRAST: CPT | Performed by: INTERNAL MEDICINE

## 2023-01-30 ENCOUNTER — OFFICE VISIT (OUTPATIENT)
Dept: PHYSICAL THERAPY | Facility: HOSPITAL | Age: 43
End: 2023-01-30
Attending: ORTHOPAEDIC SURGERY
Payer: COMMERCIAL

## 2023-01-30 PROCEDURE — 97140 MANUAL THERAPY 1/> REGIONS: CPT

## 2023-01-30 PROCEDURE — 97110 THERAPEUTIC EXERCISES: CPT

## 2023-01-31 ENCOUNTER — TELEPHONE (OUTPATIENT)
Dept: ORTHOPEDICS CLINIC | Facility: CLINIC | Age: 43
End: 2023-01-31

## 2023-01-31 DIAGNOSIS — Z98.890 S/P ARTHROSCOPY OF SHOULDER: Primary | ICD-10-CM

## 2023-02-01 ENCOUNTER — OFFICE VISIT (OUTPATIENT)
Dept: PHYSICAL THERAPY | Facility: HOSPITAL | Age: 43
End: 2023-02-01
Attending: ORTHOPAEDIC SURGERY
Payer: COMMERCIAL

## 2023-02-01 DIAGNOSIS — J98.59 MEDIASTINAL MASS: Primary | ICD-10-CM

## 2023-02-01 PROCEDURE — 97140 MANUAL THERAPY 1/> REGIONS: CPT

## 2023-02-01 PROCEDURE — 97110 THERAPEUTIC EXERCISES: CPT

## 2023-02-02 ENCOUNTER — APPOINTMENT (OUTPATIENT)
Dept: PHYSICAL THERAPY | Facility: HOSPITAL | Age: 43
End: 2023-02-02
Attending: ORTHOPAEDIC SURGERY
Payer: COMMERCIAL

## 2023-02-06 ENCOUNTER — OFFICE VISIT (OUTPATIENT)
Dept: PHYSICAL THERAPY | Facility: HOSPITAL | Age: 43
End: 2023-02-06
Attending: ORTHOPAEDIC SURGERY
Payer: COMMERCIAL

## 2023-02-06 PROCEDURE — 97140 MANUAL THERAPY 1/> REGIONS: CPT

## 2023-02-06 PROCEDURE — 97110 THERAPEUTIC EXERCISES: CPT

## 2023-02-08 ENCOUNTER — OFFICE VISIT (OUTPATIENT)
Dept: PHYSICAL THERAPY | Facility: HOSPITAL | Age: 43
End: 2023-02-08
Attending: ORTHOPAEDIC SURGERY
Payer: COMMERCIAL

## 2023-02-08 DIAGNOSIS — Z98.890 S/P ARTHROSCOPY OF SHOULDER: ICD-10-CM

## 2023-02-08 PROCEDURE — 97140 MANUAL THERAPY 1/> REGIONS: CPT

## 2023-02-08 PROCEDURE — 97110 THERAPEUTIC EXERCISES: CPT

## 2023-02-13 ENCOUNTER — TELEPHONE (OUTPATIENT)
Dept: PHYSICAL THERAPY | Facility: HOSPITAL | Age: 43
End: 2023-02-13

## 2023-02-13 ENCOUNTER — OFFICE VISIT (OUTPATIENT)
Dept: PHYSICAL THERAPY | Facility: HOSPITAL | Age: 43
End: 2023-02-13
Attending: ORTHOPAEDIC SURGERY
Payer: COMMERCIAL

## 2023-02-13 PROCEDURE — 97110 THERAPEUTIC EXERCISES: CPT

## 2023-02-13 PROCEDURE — 97140 MANUAL THERAPY 1/> REGIONS: CPT

## 2023-02-20 ENCOUNTER — OFFICE VISIT (OUTPATIENT)
Dept: PHYSICAL THERAPY | Facility: HOSPITAL | Age: 43
End: 2023-02-20
Attending: ORTHOPAEDIC SURGERY
Payer: COMMERCIAL

## 2023-02-20 PROCEDURE — 97110 THERAPEUTIC EXERCISES: CPT

## 2023-02-20 PROCEDURE — 97140 MANUAL THERAPY 1/> REGIONS: CPT

## 2023-02-24 ENCOUNTER — OFFICE VISIT (OUTPATIENT)
Dept: PHYSICAL THERAPY | Facility: HOSPITAL | Age: 43
End: 2023-02-24
Attending: ORTHOPAEDIC SURGERY
Payer: COMMERCIAL

## 2023-02-24 PROCEDURE — 97140 MANUAL THERAPY 1/> REGIONS: CPT

## 2023-02-24 PROCEDURE — 97110 THERAPEUTIC EXERCISES: CPT

## 2023-02-24 RX ORDER — AMLODIPINE BESYLATE 5 MG/1
TABLET ORAL
Qty: 135 TABLET | Refills: 1 | OUTPATIENT
Start: 2023-02-24

## 2023-02-24 RX ORDER — AMLODIPINE BESYLATE 10 MG/1
10 TABLET ORAL DAILY
Qty: 30 TABLET | Refills: 0 | Status: SHIPPED | OUTPATIENT
Start: 2023-02-24

## 2023-02-27 ENCOUNTER — OFFICE VISIT (OUTPATIENT)
Dept: PHYSICAL THERAPY | Facility: HOSPITAL | Age: 43
End: 2023-02-27
Attending: ORTHOPAEDIC SURGERY
Payer: COMMERCIAL

## 2023-02-27 PROCEDURE — 97140 MANUAL THERAPY 1/> REGIONS: CPT

## 2023-02-27 PROCEDURE — 97110 THERAPEUTIC EXERCISES: CPT

## 2023-02-27 RX ORDER — AMLODIPINE BESYLATE 10 MG/1
TABLET ORAL
Qty: 90 TABLET | Refills: 0 | OUTPATIENT
Start: 2023-02-27

## 2023-03-10 ENCOUNTER — APPOINTMENT (OUTPATIENT)
Dept: PHYSICAL THERAPY | Facility: HOSPITAL | Age: 43
End: 2023-03-10
Attending: ORTHOPAEDIC SURGERY
Payer: COMMERCIAL

## 2023-03-10 PROCEDURE — 97110 THERAPEUTIC EXERCISES: CPT

## 2023-03-10 PROCEDURE — 97012 MECHANICAL TRACTION THERAPY: CPT

## 2023-03-14 ENCOUNTER — APPOINTMENT (OUTPATIENT)
Dept: PHYSICAL THERAPY | Facility: HOSPITAL | Age: 43
End: 2023-03-14
Attending: ORTHOPAEDIC SURGERY
Payer: COMMERCIAL

## 2023-03-14 ENCOUNTER — TELEPHONE (OUTPATIENT)
Dept: PHYSICAL THERAPY | Facility: HOSPITAL | Age: 43
End: 2023-03-14

## 2023-03-14 PROCEDURE — 97140 MANUAL THERAPY 1/> REGIONS: CPT

## 2023-03-14 PROCEDURE — 97110 THERAPEUTIC EXERCISES: CPT

## 2023-03-15 ENCOUNTER — OFFICE VISIT (OUTPATIENT)
Dept: ORTHOPEDICS CLINIC | Facility: CLINIC | Age: 43
End: 2023-03-15
Payer: COMMERCIAL

## 2023-03-15 DIAGNOSIS — Z98.890 S/P ARTHROSCOPY OF SHOULDER: Primary | ICD-10-CM

## 2023-03-15 DIAGNOSIS — S43.432D TEAR OF LEFT GLENOID LABRUM, SUBSEQUENT ENCOUNTER: ICD-10-CM

## 2023-03-15 PROCEDURE — 99213 OFFICE O/P EST LOW 20 MIN: CPT | Performed by: ORTHOPAEDIC SURGERY

## 2023-03-16 ENCOUNTER — OFFICE VISIT (OUTPATIENT)
Dept: PHYSICAL THERAPY | Facility: HOSPITAL | Age: 43
End: 2023-03-16
Attending: ORTHOPAEDIC SURGERY
Payer: COMMERCIAL

## 2023-03-16 PROCEDURE — 97140 MANUAL THERAPY 1/> REGIONS: CPT

## 2023-03-16 PROCEDURE — 97110 THERAPEUTIC EXERCISES: CPT

## 2023-03-20 ENCOUNTER — APPOINTMENT (OUTPATIENT)
Dept: PHYSICAL THERAPY | Facility: HOSPITAL | Age: 43
End: 2023-03-20
Attending: ORTHOPAEDIC SURGERY
Payer: COMMERCIAL

## 2023-03-20 ENCOUNTER — TELEPHONE (OUTPATIENT)
Dept: PHYSICAL THERAPY | Facility: HOSPITAL | Age: 43
End: 2023-03-20

## 2023-03-22 ENCOUNTER — APPOINTMENT (OUTPATIENT)
Dept: PHYSICAL THERAPY | Facility: HOSPITAL | Age: 43
End: 2023-03-22
Attending: ORTHOPAEDIC SURGERY
Payer: COMMERCIAL

## 2023-03-22 PROCEDURE — 97110 THERAPEUTIC EXERCISES: CPT

## 2023-03-28 ENCOUNTER — OFFICE VISIT (OUTPATIENT)
Dept: PHYSICAL THERAPY | Facility: HOSPITAL | Age: 43
End: 2023-03-28
Attending: ORTHOPAEDIC SURGERY
Payer: COMMERCIAL

## 2023-03-28 PROCEDURE — 97110 THERAPEUTIC EXERCISES: CPT

## 2023-03-28 NOTE — PROGRESS NOTES
Diagnosis:   Tear of left glenoid labrum, subsequent encounter (G32.703N)       Referring Provider: Jacinto  Date of Evaluation:    12/16/22    Precautions: Activity Precautions: Passive ROMAT, 1 lb WB x 4 weeks. Begin PT POD #1. Per Dr. Malik Washburn: utilize shoulder stabilization protocal Next MD visit: 3/15/2023  Date of Surgery: 12/15/22   Insurance Primary/Secondary: [address] [place] [address] HMO / N/A     # Auth Visits: 33  DOS: 1/15/2022   Progress Summary  Pt has attended 24 visits in Physical Therapy. Subjective: The patient reports that he has difficulty reaching for objects with L UE. He reports it produces anterior shoulder pain. He also reports difficulty with quicker movements involving the L UE. He has no restrictions at work and has returned to full duty. Pain: 2/10 updated 3/28    Objective:   3/28/23    AROM: (* denotes performed with pain)  Shoulder    Flexion: L  165 degrees, mild pain on ant shoulder  Abduction: L 165 degrees, mild pain on ant shoulder  ER: 85 deg with elbow at side     MMT  ER 4+, IR 5, abd 4+, flexion 4+      Assessment:  Patient tolerated therapeutic exercise well today with continued focused on closed chain strength and stability to improve closed chain strength. Incorporated rhythmic stabilization today and work on shoulder strengthening in fully abducted and flexed position. Patient will continue to benefit from skilled PT in order to address further limitations in strength and stability of the shoulder.        Goals: (To be met in 33 visits)   Pt will report improved ability to sleep without waking due to shoulder pain  MET  Pt will improve shoulder flexion AROM to >170 degrees to be able to reach into overhead cabinets without pain or restriction  PROGRESSING  Pt will improve shoulder abduction AROM to >170 degrees to improve ability to don deodorant, don/doff shirts, and wash hair PROGRESSING  Pt will increase shoulder AROM ER to 80 to reach and fasten seatbelt  MET   Pt will increase shoulder AROM IR to 60 to be able to reach in back pocket, tuck in shirt, and turn steering wheel without pain PROGRESSING  Pt will improve shoulder strength throughout to 5/5 to improve function with ADLs including lifting, pushing and pulling boxes overhead. PROGRESSING  Pt will demonstrate increased mid/low trap strength to 5/5 to promote improved shoulder mechanics and stabilization with ADL such as lifting and reaching   PROGRESSING   Pt will be independent and compliant with comprehensive HEP to maintain progress achieved in PT  PROGRESSING    Plan: Continue skilled Physical Therapy 2 x/week or a total of 9 visits over a 90 day period. Treatment will include: therapeutic exercise        Patient/Family/Caregiver was advised of these findings, precautions, and treatment options and has agreed to actively participate in planning and for this course of care. Thank you for your referral. If you have any questions, please contact me at Dept: [phone]. Sincerely,  Electronically signed by therapist: Jana Simpson     Physician's certification required:  Yes  Please co-sign or sign and return this letter via fax as soon as possible to [phone]. I certify the need for these services furnished under this plan of treatment and while under my care.     X___________________________________________________ Date____________________    Certification From: 9/88/2502  To:6/26/2023   Plan: update HEP  Date: 3/10/23  Tx#: 20/24 Date: 3/14/23  Tx#: 21/24 Date: 3/16/23  Tx#: 22/24 Date: 3/22/23  Tx#: 23/24 Date: 3/28/23  Tx#: 24/33   Manual: x8 min  PROM into flexion, abduction, ER at side, gentle ER in abducted position Manual: x8 min  PROM into flexion, abduction, ER at side, gentle ER in abducted position Manual: x8 min  PROM into flexion, abduction, ER at side, gentle ER in abducted position X X   Ther Ex:   Prone T 3# 2x10-12  Prone Y 2# 3x10-12  Push up with +  2x10  PNF D1/D2 2x8 ea  Serratus FR rolls 10x modified range  Cross arm stretch 3x30 sec  Stretch ER at side 5x10 sec   Ther Ex: Towel slide wall 10x  Diagonals on wall 10x ea  Horizontal abduction red TB 3x8  Resisted ER yellow TB at 90 3x8  Resisted IR green TB at 90 3x8  Horizontal abduction with OH press 3# 3x6-8  Body blade 2x30 sec  3 direction shelf taps on door 2# 5x  ER stretching at side 5x10 sec Ther Ex:   PNF D1/D2 flexion green TB 2x8 on L  Push up on swiss ball on wall 2x10  Alt shoulder flexion arms on swiss ball on wall 2x5 ea  High row with ER 3x6 light green tube band  stroop reaching [facility] for various colors 10x ea (reaching for colors and words)  Supine chest press 5# 2x8   Ther Ex: Towel slides 10x  pec stretch gentel at 60 and 90, 2x30 sec ea  Push up with + 2x12  Lateral shoulder walking green TB 3 laps ballet bar  Scapular retraction with ER green TB 3x8  Horizontal abduction green TB 3x8 Ther Ex: Towel slides 10x  Diagonals on wall 10x ea  pec stretch gentel at 60 and 90, 2x30 sec ea  Push up with + 2x12  Lateral shoulder walking 5 laps ballet bar  Scapular retraction with ER blue TB 2x6  Supine shoulder rhythmic stabilization ant/post/lat x10 with arm at 90 deg  Supine chest press with stabilizations 3# x10   Supine serratus punches with 3# 2x10  Cable machine punch outs with 15# elbow at side and elbow at 90 deg abduction x10 ea  Standing YTI's with 5# x10 ea  Body blade 2x30 sec  Angled wall spiders with green TB x10  Push up at ballet bar with rhythmic stabilization with red TB x30 sec   HEP:   Access Code: [email or web address]  URL: [email or web address]  Date: 02/20/2023  Prepared by: Clover Contreras    Exercises  Shoulder Flexion Wall Slide with Towel - 1 x daily - 7 x weekly - 2 sets - 10 reps  Standing Shoulder Abduction Slides at Wall - 1 x daily - 7 x weekly - 2 sets - 10 reps  Prone Shoulder Row - 1 x daily - 7 x weekly - 3 sets - 10-12 reps  Shoulder External Rotation with Anchored Resistance - 1 x daily - 7 x weekly - 3 sets - 6-8 reps  Standing Shoulder External Rotation Stretch in Doorway - 1 x daily - 7 x weekly - 1 sets - 5 reps - 10 sec hold        Charges:  Ther Ex x3  Total Timed Treatment:x45 min  Total Treatment Time: x45 min

## 2023-03-29 ENCOUNTER — APPOINTMENT (OUTPATIENT)
Dept: PHYSICAL THERAPY | Facility: HOSPITAL | Age: 43
End: 2023-03-29
Attending: ORTHOPAEDIC SURGERY
Payer: COMMERCIAL

## 2023-03-29 DIAGNOSIS — I10 PRIMARY HYPERTENSION: Primary | ICD-10-CM

## 2023-03-29 RX ORDER — AMLODIPINE BESYLATE 10 MG/1
TABLET ORAL
Qty: 30 TABLET | Refills: 0 | Status: SHIPPED | OUTPATIENT
Start: 2023-03-29

## 2023-04-03 ENCOUNTER — OFFICE VISIT (OUTPATIENT)
Dept: PHYSICAL THERAPY | Facility: HOSPITAL | Age: 43
End: 2023-04-03
Attending: ORTHOPAEDIC SURGERY
Payer: COMMERCIAL

## 2023-04-03 PROCEDURE — 97110 THERAPEUTIC EXERCISES: CPT

## 2023-04-03 NOTE — PROGRESS NOTES
Diagnosis:   Tear of left glenoid labrum, subsequent encounter (R34.848N)       Referring Provider: Jacinto  Date of Evaluation:    12/16/22    Precautions: Activity Precautions: Passive ROMAT, 1 lb WB x 4 weeks. Begin PT POD #1. Per Dr. Mumtaz Edwards: utilize shoulder stabilization protocal Next MD visit: 3/15/2023  Date of Surgery: 12/15/22   Insurance Primary/Secondary: [employer] HMO / N/A     # Auth Visits: 33  DOS: 1/15/2022    Subjective: The patient reports that his shoulder is painful with any activity above 90 deg of abduction. Overall things are getting easier with work and around the home. Pain: 2/10    Objective:   4/3/23    AROM: (* denotes performed with pain)  Shoulder    Flexion: L  165 degrees, mild pain on ant shoulder  Abduction: L 165 degrees, mild pain on ant shoulder  ER: 80 deg with elbow at side, 25 deg shoulder at 90 abd     MMT  ER 4+, IR 5, abd 4+, flexion 4+      Assessment:  Patient tolerated therapeutic exercise well today with continued focused on closed chain strength and shoulder stability. Patient continues to display significant weakness in L shoulder ER, abduction, and flexion when compared to the R. Patient stated occasional L shoulder discomfort with shoulder abduction activities but no with shoulder flexion. Patient will continue to benefit from skilled PT in order to address further limitations in strength and stability of the shoulder.        Goals: (To be met in 33 visits)   Pt will report improved ability to sleep without waking due to shoulder pain  MET  Pt will improve shoulder flexion AROM to >170 degrees to be able to reach into overhead cabinets without pain or restriction  PROGRESSING  Pt will improve shoulder abduction AROM to >170 degrees to improve ability to don deodorant, don/doff shirts, and wash hair PROGRESSING  Pt will increase shoulder AROM ER to 80 to reach and fasten seatbelt  MET   Pt will increase shoulder AROM IR to 60 to be able to reach in back pocket, tuck in shirt, and turn steering wheel without pain PROGRESSING  Pt will improve shoulder strength throughout to 5/5 to improve function with ADLs including lifting, pushing and pulling boxes overhead. PROGRESSING  Pt will demonstrate increased mid/low trap strength to 5/5 to promote improved shoulder mechanics and stabilization with ADL such as lifting and reaching   PROGRESSING   Pt will be independent and compliant with comprehensive HEP to maintain progress achieved in PT  PROGRESSING    Plan: farmer's carry, overhead press, TB should flexion and abduction, prone shoulder slides  Date: 3/14/23  Tx#: 21/24 Date: 3/16/23  Tx#: 22/24 Date: 3/22/23  Tx#: 23/24 Date: 3/28/23  Tx#: 24/33 Date: 4/3/23  Tx#: 25/33   Manual: x8 min  PROM into flexion, abduction, ER at side, gentle ER in abducted position Manual: x8 min  PROM into flexion, abduction, ER at side, gentle ER in abducted position X X X   Ther Ex: Towel slide wall 10x  Diagonals on wall 10x ea  Horizontal abduction red TB 3x8  Resisted ER yellow TB at 90 3x8  Resisted IR green TB at 90 3x8  Horizontal abduction with OH press 3# 3x6-8  Body blade 2x30 sec  3 direction shelf taps on door 2# 5x  ER stretching at side 5x10 sec Ther Ex:   PNF D1/D2 flexion green TB 2x8 on L  Push up on swiss ball on wall 2x10  Alt shoulder flexion arms on swiss ball on wall 2x5 ea  High row with ER 3x6 light green tube band  stroop reaching [facility] for various colors 10x ea (reaching for colors and words)  Supine chest press 5# 2x8   Ther Ex: Towel slides 10x  pec stretch gentel at 60 and 90, 2x30 sec ea  Push up with + 2x12  Lateral shoulder walking green TB 3 laps ballet bar  Scapular retraction with ER green TB 3x8  Horizontal abduction green TB 3x8 Ther Ex:    Towel slides 10x  Diagonals on wall 10x ea  pec stretch gentel at 60 and 90, 2x30 sec ea  Push up with + 2x12  Lateral shoulder walking 5 laps ballet bar  Scapular retraction with ER blue TB 2x6  Supine shoulder rhythmic stabilization ant/post/lat x10 with arm at 90 deg  Supine chest press with stabilizations 3# x10   Supine serratus punches with 3# 2x10  Cable machine punch outs with 15# elbow at side and elbow at 90 deg abduction x10 ea  Standing YTI's with 5# x10 ea  Body blade 2x30 sec  Angled wall spiders with green TB x10  Push up at ballet bar with rhythmic stabilization with red TB x30 sec Ther Ex: Towel slides 8x  Diagonals on wall 8x  pec stretch gentle at 60, 2x30 sec ea  Scapular retraction with ER at cable machine elbow at side with 15# 2x10  Scapular retraction with ER at cable machine elbow at side with 15# 2x10  Shoulder D2 PNF with blue TB x10  Shoulder D1 PNF with blue TB x10  Supine shoulder rhythmic stabilization ant/post/lat x10 with arm at 90 deg  Supine serratus punches with 3# 2x10  Seated shoulder stability with 1# ball toss with ER  Cable machine punch outs with 15# elbow at 90 deg abduction x10 ea  Standing YT's with 6# 2x8 ea  Body blade 2x30 sec  Wall spiders with green TB x10  Seated shoulder 90 abduction ER with blue TB 2x8  Shoulder stability on BOSU 2x30 sec   HEP:   Access Code: [email or web address]  URL: [email or web address]  Date: 02/20/2023  Prepared by: Ellen Owen    Exercises  Shoulder Flexion Wall Slide with Towel - 1 x daily - 7 x weekly - 2 sets - 10 reps  Standing Shoulder Abduction Slides at Wall - 1 x daily - 7 x weekly - 2 sets - 10 reps  Prone Shoulder Row - 1 x daily - 7 x weekly - 3 sets - 10-12 reps  Shoulder External Rotation with Anchored Resistance - 1 x daily - 7 x weekly - 3 sets - 6-8 reps  Standing Shoulder External Rotation Stretch in Doorway - 1 x daily - 7 x weekly - 1 sets - 5 reps - 10 sec hold    This treatment was provided by Brett Moritz, SPT under the direct and constant direction and supervision of a licensed therapist, who provided consultation regarding skilled judgements, treatment, and assessment of patient care. Charges:  Ther Ex x3  Total Timed Treatment:x39 min  Total Treatment Time: x39 min

## 2023-04-04 ENCOUNTER — TELEPHONE (OUTPATIENT)
Dept: PHYSICAL THERAPY | Facility: HOSPITAL | Age: 43
End: 2023-04-04

## 2023-04-05 ENCOUNTER — APPOINTMENT (OUTPATIENT)
Dept: PHYSICAL THERAPY | Facility: HOSPITAL | Age: 43
End: 2023-04-05
Attending: ORTHOPAEDIC SURGERY
Payer: COMMERCIAL

## 2023-04-10 ENCOUNTER — APPOINTMENT (OUTPATIENT)
Dept: PHYSICAL THERAPY | Facility: HOSPITAL | Age: 43
End: 2023-04-10
Attending: ORTHOPAEDIC SURGERY
Payer: COMMERCIAL

## 2023-04-12 ENCOUNTER — APPOINTMENT (OUTPATIENT)
Dept: PHYSICAL THERAPY | Facility: HOSPITAL | Age: 43
End: 2023-04-12
Attending: ORTHOPAEDIC SURGERY
Payer: COMMERCIAL

## 2023-04-17 ENCOUNTER — APPOINTMENT (OUTPATIENT)
Dept: PHYSICAL THERAPY | Facility: HOSPITAL | Age: 43
End: 2023-04-17
Attending: ORTHOPAEDIC SURGERY
Payer: COMMERCIAL

## 2023-04-17 ENCOUNTER — TELEPHONE (OUTPATIENT)
Dept: PHYSICAL THERAPY | Facility: HOSPITAL | Age: 43
End: 2023-04-17

## 2023-04-20 ENCOUNTER — APPOINTMENT (OUTPATIENT)
Dept: PHYSICAL THERAPY | Facility: HOSPITAL | Age: 43
End: 2023-04-20
Attending: ORTHOPAEDIC SURGERY
Payer: COMMERCIAL

## 2023-04-20 PROCEDURE — 97110 THERAPEUTIC EXERCISES: CPT

## 2023-04-25 ENCOUNTER — APPOINTMENT (OUTPATIENT)
Dept: PHYSICAL THERAPY | Facility: HOSPITAL | Age: 43
End: 2023-04-25
Attending: ORTHOPAEDIC SURGERY
Payer: COMMERCIAL

## 2023-05-02 ENCOUNTER — OFFICE VISIT (OUTPATIENT)
Dept: PHYSICAL THERAPY | Facility: HOSPITAL | Age: 43
End: 2023-05-02
Attending: INTERNAL MEDICINE
Payer: COMMERCIAL

## 2023-05-02 PROCEDURE — 97140 MANUAL THERAPY 1/> REGIONS: CPT

## 2023-05-02 PROCEDURE — 97110 THERAPEUTIC EXERCISES: CPT

## 2023-05-05 ENCOUNTER — OFFICE VISIT (OUTPATIENT)
Dept: PHYSICAL THERAPY | Facility: HOSPITAL | Age: 43
End: 2023-05-05
Attending: INTERNAL MEDICINE
Payer: COMMERCIAL

## 2023-05-05 PROCEDURE — 97140 MANUAL THERAPY 1/> REGIONS: CPT

## 2023-05-05 PROCEDURE — 97110 THERAPEUTIC EXERCISES: CPT

## 2023-05-06 DIAGNOSIS — I10 PRIMARY HYPERTENSION: ICD-10-CM

## 2023-05-08 ENCOUNTER — TELEPHONE (OUTPATIENT)
Dept: ORTHOPEDICS CLINIC | Facility: CLINIC | Age: 43
End: 2023-05-08

## 2023-05-08 DIAGNOSIS — I10 PRIMARY HYPERTENSION: ICD-10-CM

## 2023-05-08 RX ORDER — AMLODIPINE BESYLATE 10 MG/1
TABLET ORAL
Qty: 90 TABLET | Refills: 0 | OUTPATIENT
Start: 2023-05-08

## 2023-05-08 RX ORDER — AMLODIPINE BESYLATE 10 MG/1
TABLET ORAL
Qty: 30 TABLET | Refills: 0 | Status: SHIPPED | OUTPATIENT
Start: 2023-05-08

## 2023-05-08 NOTE — TELEPHONE ENCOUNTER
Just received WDR mail from [place] w/ stamped received 03/22, from [facility], [facility] [address] regarding patient's request for medical records, faxed and sent original to scan stat.

## 2023-05-10 ENCOUNTER — OFFICE VISIT (OUTPATIENT)
Dept: NEUROLOGY | Facility: CLINIC | Age: 43
End: 2023-05-10
Payer: COMMERCIAL

## 2023-05-10 ENCOUNTER — OFFICE VISIT (OUTPATIENT)
Dept: PHYSICAL THERAPY | Facility: HOSPITAL | Age: 43
End: 2023-05-10
Attending: INTERNAL MEDICINE
Payer: COMMERCIAL

## 2023-05-10 VITALS
BODY MASS INDEX: 33 KG/M2 | DIASTOLIC BLOOD PRESSURE: 72 MMHG | WEIGHT: 268 LBS | RESPIRATION RATE: 16 BRPM | SYSTOLIC BLOOD PRESSURE: 128 MMHG | HEART RATE: 72 BPM

## 2023-05-10 DIAGNOSIS — R55 SYNCOPE, UNSPECIFIED SYNCOPE TYPE: Primary | ICD-10-CM

## 2023-05-10 PROCEDURE — 3074F SYST BP LT 130 MM HG: CPT | Performed by: OTHER

## 2023-05-10 PROCEDURE — 99214 OFFICE O/P EST MOD 30 MIN: CPT | Performed by: OTHER

## 2023-05-10 PROCEDURE — 97140 MANUAL THERAPY 1/> REGIONS: CPT

## 2023-05-10 PROCEDURE — 97110 THERAPEUTIC EXERCISES: CPT

## 2023-05-10 PROCEDURE — 3078F DIAST BP <80 MM HG: CPT | Performed by: OTHER

## 2023-05-15 ENCOUNTER — APPOINTMENT (OUTPATIENT)
Dept: PHYSICAL THERAPY | Facility: HOSPITAL | Age: 43
End: 2023-05-15
Attending: Other
Payer: COMMERCIAL

## 2023-05-15 ENCOUNTER — TELEPHONE (OUTPATIENT)
Dept: PHYSICAL THERAPY | Facility: HOSPITAL | Age: 43
End: 2023-05-15

## 2023-05-17 ENCOUNTER — APPOINTMENT (OUTPATIENT)
Dept: PHYSICAL THERAPY | Facility: HOSPITAL | Age: 43
End: 2023-05-17
Attending: INTERNAL MEDICINE
Payer: COMMERCIAL

## 2023-05-22 ENCOUNTER — PATIENT MESSAGE (OUTPATIENT)
Dept: PHYSICAL THERAPY | Facility: HOSPITAL | Age: 43
End: 2023-05-22

## 2023-05-22 ENCOUNTER — TELEPHONE (OUTPATIENT)
Dept: PHYSICAL THERAPY | Facility: HOSPITAL | Age: 43
End: 2023-05-22

## 2023-05-22 ENCOUNTER — APPOINTMENT (OUTPATIENT)
Dept: PHYSICAL THERAPY | Facility: HOSPITAL | Age: 43
End: 2023-05-22
Attending: INTERNAL MEDICINE
Payer: COMMERCIAL

## 2023-05-23 ENCOUNTER — OFFICE VISIT (OUTPATIENT)
Dept: PHYSICAL THERAPY | Facility: HOSPITAL | Age: 43
End: 2023-05-23
Attending: INTERNAL MEDICINE
Payer: COMMERCIAL

## 2023-05-23 ENCOUNTER — TELEPHONE (OUTPATIENT)
Dept: PHYSICAL THERAPY | Facility: HOSPITAL | Age: 43
End: 2023-05-23

## 2023-05-23 PROCEDURE — 97110 THERAPEUTIC EXERCISES: CPT

## 2023-05-23 PROCEDURE — 97140 MANUAL THERAPY 1/> REGIONS: CPT

## 2023-05-26 ENCOUNTER — OFFICE VISIT (OUTPATIENT)
Dept: PHYSICAL THERAPY | Facility: HOSPITAL | Age: 43
End: 2023-05-26
Attending: INTERNAL MEDICINE
Payer: COMMERCIAL

## 2023-05-26 PROCEDURE — 97140 MANUAL THERAPY 1/> REGIONS: CPT

## 2023-05-26 PROCEDURE — 97110 THERAPEUTIC EXERCISES: CPT

## 2023-05-30 ENCOUNTER — OFFICE VISIT (OUTPATIENT)
Dept: PHYSICAL THERAPY | Facility: HOSPITAL | Age: 43
End: 2023-05-30
Attending: INTERNAL MEDICINE
Payer: COMMERCIAL

## 2023-05-30 PROCEDURE — 97140 MANUAL THERAPY 1/> REGIONS: CPT

## 2023-05-30 PROCEDURE — 97110 THERAPEUTIC EXERCISES: CPT

## 2023-06-07 ENCOUNTER — TELEPHONE (OUTPATIENT)
Dept: PHYSICAL THERAPY | Facility: HOSPITAL | Age: 43
End: 2023-06-07

## 2023-06-09 ENCOUNTER — OFFICE VISIT (OUTPATIENT)
Dept: PHYSICAL THERAPY | Facility: HOSPITAL | Age: 43
End: 2023-06-09
Attending: INTERNAL MEDICINE

## 2023-06-09 PROCEDURE — 97110 THERAPEUTIC EXERCISES: CPT

## 2023-06-12 ENCOUNTER — OFFICE VISIT (OUTPATIENT)
Dept: ORTHOPEDICS CLINIC | Facility: CLINIC | Age: 43
End: 2023-06-12

## 2023-06-12 DIAGNOSIS — Z98.890 S/P ARTHROSCOPY OF SHOULDER: Primary | ICD-10-CM

## 2023-06-12 PROCEDURE — 99213 OFFICE O/P EST LOW 20 MIN: CPT | Performed by: ORTHOPAEDIC SURGERY

## 2023-06-16 ENCOUNTER — TELEPHONE (OUTPATIENT)
Dept: NEUROLOGY | Facility: CLINIC | Age: 43
End: 2023-06-16

## 2023-06-16 NOTE — TELEPHONE ENCOUNTER
Rec'd records request from [facility] 11/10/2022, all records and itemized billing. Sent to medical records.

## 2023-07-06 NOTE — TELEPHONE ENCOUNTER
Needs ov TRUONG Group Note     07/05/23 1413   Activity/Group Checklist   Group Other (Comment)  (Open discussion on the nature of freedom and happiness)   Attendance Attended   Attendance Duration (min) 31-45  (in and out of group)   Interactions Did not interact  (completed activity but did not share with group)   Affect/Mood Bright  (Unfocused)   Goals Achieved Able to listen to others; Able to engage in interactions; Able to recieve feedback; Able to give feedback to another  (received resources/benefited from social presence of group)

## 2023-07-13 DIAGNOSIS — I10 PRIMARY HYPERTENSION: ICD-10-CM

## 2023-07-13 RX ORDER — AMLODIPINE BESYLATE 10 MG/1
10 TABLET ORAL DAILY
Qty: 30 TABLET | Refills: 0 | Status: SHIPPED | OUTPATIENT
Start: 2023-07-13

## 2023-08-02 ENCOUNTER — PATIENT MESSAGE (OUTPATIENT)
Dept: INTERNAL MEDICINE CLINIC | Facility: CLINIC | Age: 43
End: 2023-08-02

## 2023-08-02 NOTE — TELEPHONE ENCOUNTER
Discussed with [address]. Patient will need to be seen for evaluation. Last renal evaluation with CKD and indomethacin is contraindicated.

## 2023-08-21 ENCOUNTER — TELEPHONE (OUTPATIENT)
Dept: INTERNAL MEDICINE CLINIC | Facility: CLINIC | Age: 43
End: 2023-08-21

## 2023-08-21 NOTE — TELEPHONE ENCOUNTER
Incoming (mail or fax):   Mail  Received from:  [address]  Documentation given to:  Pete Lawson

## 2023-08-22 NOTE — TELEPHONE ENCOUNTER
Medical Record Request Received    Date Received in Office: 8/21/23    Records Request Received From: [facility]    Date Sent to Scan Stat: 8/23/23    STAT Request?: no    Copy made for Provider & place in incoming folder: n/a    12/27/22-12/30/22, 1/23/23, 1/26/23

## 2023-08-29 ENCOUNTER — HOSPITAL ENCOUNTER (OUTPATIENT)
Dept: CT IMAGING | Age: 43
Discharge: HOME | End: 2023-08-29
Attending: INTERNAL MEDICINE
Payer: COMMERCIAL

## 2023-08-29 DIAGNOSIS — J98.59 MEDIASTINAL MASS: ICD-10-CM

## 2023-08-29 LAB
CREAT BLD-MCNC: 1.5 MG/DL
EGFRCR SERPLBLD CKD-EPI 2021: 58 ML/MIN/1.73M2 (ref 60–?)

## 2023-08-29 PROCEDURE — 71260 CT THORAX DX C+: CPT | Performed by: INTERNAL MEDICINE

## 2023-08-29 PROCEDURE — 82565 ASSAY OF CREATININE: CPT

## 2023-08-29 RX ORDER — IOHEXOL 350 MG/ML
75 INJECTION, SOLUTION INTRAVENOUS
Status: COMPLETED | OUTPATIENT
Start: 2023-08-29 | End: 2023-08-29

## 2023-08-29 RX ADMIN — IOHEXOL 75 ML: 350 INJECTION, SOLUTION INTRAVENOUS at 18:51:00

## 2023-09-19 ENCOUNTER — OFFICE VISIT (OUTPATIENT)
Dept: INTERNAL MEDICINE CLINIC | Facility: CLINIC | Age: 43
End: 2023-09-19
Payer: COMMERCIAL

## 2023-09-19 VITALS
HEART RATE: 84 BPM | TEMPERATURE: 98 F | DIASTOLIC BLOOD PRESSURE: 78 MMHG | WEIGHT: 269.38 LBS | BODY MASS INDEX: 32.8 KG/M2 | HEIGHT: 76 IN | SYSTOLIC BLOOD PRESSURE: 126 MMHG | RESPIRATION RATE: 18 BRPM | OXYGEN SATURATION: 98 %

## 2023-09-19 DIAGNOSIS — Z13.220 SCREENING, LIPID: ICD-10-CM

## 2023-09-19 DIAGNOSIS — Z12.5 SCREENING FOR PROSTATE CANCER: ICD-10-CM

## 2023-09-19 DIAGNOSIS — M10.9 GOUT INVOLVING TOE, UNSPECIFIED CAUSE, UNSPECIFIED CHRONICITY, UNSPECIFIED LATERALITY: Primary | ICD-10-CM

## 2023-09-19 DIAGNOSIS — Z13.1 SCREENING FOR DIABETES MELLITUS: ICD-10-CM

## 2023-09-19 DIAGNOSIS — I71.21 ANEURYSM OF ASCENDING AORTA WITHOUT RUPTURE (HCC): ICD-10-CM

## 2023-09-19 DIAGNOSIS — I10 PRIMARY HYPERTENSION: ICD-10-CM

## 2023-09-19 DIAGNOSIS — N18.2 STAGE 2 CHRONIC KIDNEY DISEASE: ICD-10-CM

## 2023-09-19 DIAGNOSIS — Z13.29 SCREENING FOR THYROID DISORDER: ICD-10-CM

## 2023-09-19 DIAGNOSIS — L98.9 SKIN LESION: ICD-10-CM

## 2023-09-19 DIAGNOSIS — Z13.0 SCREENING FOR BLOOD DISEASE: ICD-10-CM

## 2023-09-19 PROCEDURE — 3074F SYST BP LT 130 MM HG: CPT | Performed by: NURSE PRACTITIONER

## 2023-09-19 PROCEDURE — 3078F DIAST BP <80 MM HG: CPT | Performed by: NURSE PRACTITIONER

## 2023-09-19 PROCEDURE — 99214 OFFICE O/P EST MOD 30 MIN: CPT | Performed by: NURSE PRACTITIONER

## 2023-09-19 PROCEDURE — 3008F BODY MASS INDEX DOCD: CPT | Performed by: NURSE PRACTITIONER

## 2023-09-19 RX ORDER — TRIAMCINOLONE ACETONIDE 1 MG/G
CREAM TOPICAL 2 TIMES DAILY PRN
Qty: 30 G | Refills: 0 | Status: SHIPPED | OUTPATIENT
Start: 2023-09-19

## 2023-09-21 ENCOUNTER — LAB ENCOUNTER (OUTPATIENT)
Dept: LAB | Age: 43
End: 2023-09-21
Attending: FAMILY MEDICINE
Payer: COMMERCIAL

## 2023-09-21 DIAGNOSIS — Z13.1 SCREENING FOR DIABETES MELLITUS: ICD-10-CM

## 2023-09-21 DIAGNOSIS — Z13.0 SCREENING FOR BLOOD DISEASE: ICD-10-CM

## 2023-09-21 DIAGNOSIS — Z13.220 SCREENING, LIPID: ICD-10-CM

## 2023-09-21 DIAGNOSIS — Z13.29 SCREENING FOR THYROID DISORDER: ICD-10-CM

## 2023-09-21 DIAGNOSIS — Z12.5 SCREENING FOR PROSTATE CANCER: ICD-10-CM

## 2023-09-21 LAB
ALBUMIN SERPL-MCNC: 3.3 G/DL (ref 3.4–5)
ALBUMIN/GLOB SERPL: 0.9 {RATIO} (ref 1–2)
ALP LIVER SERPL-CCNC: 52 U/L
ALT SERPL-CCNC: 29 U/L
ANION GAP SERPL CALC-SCNC: 4 MMOL/L (ref 0–18)
AST SERPL-CCNC: 21 U/L (ref 15–37)
BASOPHILS # BLD AUTO: 0.02 X10(3) UL (ref 0–0.2)
BASOPHILS NFR BLD AUTO: 0.7 %
BILIRUB SERPL-MCNC: 0.6 MG/DL (ref 0.1–2)
BUN BLD-MCNC: 15 MG/DL (ref 7–18)
CALCIUM BLD-MCNC: 9 MG/DL (ref 8.5–10.1)
CHLORIDE SERPL-SCNC: 106 MMOL/L (ref 98–112)
CHOLEST SERPL-MCNC: 165 MG/DL (ref ?–200)
CO2 SERPL-SCNC: 29 MMOL/L (ref 21–32)
COMPLEXED PSA SERPL-MCNC: 0.95 NG/ML (ref ?–4)
CREAT BLD-MCNC: 1.49 MG/DL
EGFRCR SERPLBLD CKD-EPI 2021: 59 ML/MIN/1.73M2 (ref 60–?)
EOSINOPHIL # BLD AUTO: 0.06 X10(3) UL (ref 0–0.7)
EOSINOPHIL NFR BLD AUTO: 2.2 %
ERYTHROCYTE [DISTWIDTH] IN BLOOD BY AUTOMATED COUNT: 11.7 %
FASTING PATIENT LIPID ANSWER: YES
FASTING STATUS PATIENT QL REPORTED: YES
GLOBULIN PLAS-MCNC: 3.5 G/DL (ref 2.8–4.4)
GLUCOSE BLD-MCNC: 107 MG/DL (ref 70–99)
HCT VFR BLD AUTO: 41.1 %
HDLC SERPL-MCNC: 58 MG/DL (ref 40–59)
HGB BLD-MCNC: 14.3 G/DL
IMM GRANULOCYTES # BLD AUTO: 0.01 X10(3) UL (ref 0–1)
IMM GRANULOCYTES NFR BLD: 0.4 %
LDLC SERPL CALC-MCNC: 93 MG/DL (ref ?–100)
LYMPHOCYTES # BLD AUTO: 0.66 X10(3) UL (ref 1–4)
LYMPHOCYTES NFR BLD AUTO: 24.4 %
MCH RBC QN AUTO: 28.1 PG (ref 26–34)
MCHC RBC AUTO-ENTMCNC: 34.8 G/DL (ref 31–37)
MCV RBC AUTO: 80.7 FL
MONOCYTES # BLD AUTO: 0.3 X10(3) UL (ref 0.1–1)
MONOCYTES NFR BLD AUTO: 11.1 %
NEUTROPHILS # BLD AUTO: 1.65 X10 (3) UL (ref 1.5–7.7)
NEUTROPHILS # BLD AUTO: 1.65 X10(3) UL (ref 1.5–7.7)
NEUTROPHILS NFR BLD AUTO: 61.2 %
NONHDLC SERPL-MCNC: 107 MG/DL (ref ?–130)
OSMOLALITY SERPL CALC.SUM OF ELEC: 289 MOSM/KG (ref 275–295)
PLATELET # BLD AUTO: 194 10(3)UL (ref 150–450)
POTASSIUM SERPL-SCNC: 4.2 MMOL/L (ref 3.5–5.1)
PROT SERPL-MCNC: 6.8 G/DL (ref 6.4–8.2)
RBC # BLD AUTO: 5.09 X10(6)UL
SODIUM SERPL-SCNC: 139 MMOL/L (ref 136–145)
TRIGL SERPL-MCNC: 72 MG/DL (ref 30–149)
TSI SER-ACNC: 1.06 MIU/ML (ref 0.36–3.74)
VLDLC SERPL CALC-MCNC: 12 MG/DL (ref 0–30)
WBC # BLD AUTO: 2.7 X10(3) UL (ref 4–11)

## 2023-09-21 PROCEDURE — 80050 GENERAL HEALTH PANEL: CPT | Performed by: NURSE PRACTITIONER

## 2023-09-21 PROCEDURE — 80061 LIPID PANEL: CPT | Performed by: NURSE PRACTITIONER

## 2023-09-21 PROCEDURE — 84153 ASSAY OF PSA TOTAL: CPT | Performed by: NURSE PRACTITIONER

## 2023-09-25 ENCOUNTER — APPOINTMENT (OUTPATIENT)
Dept: CT IMAGING | Facility: HOSPITAL | Age: 43
End: 2023-09-25
Attending: EMERGENCY MEDICINE
Payer: COMMERCIAL

## 2023-09-25 ENCOUNTER — HOSPITAL ENCOUNTER (OUTPATIENT)
Age: 43
Discharge: ANOTHER HOSPITAL | End: 2023-09-25
Attending: EMERGENCY MEDICINE
Payer: COMMERCIAL

## 2023-09-25 VITALS
RESPIRATION RATE: 20 BRPM | OXYGEN SATURATION: 99 % | SYSTOLIC BLOOD PRESSURE: 145 MMHG | DIASTOLIC BLOOD PRESSURE: 92 MMHG | HEART RATE: 91 BPM

## 2023-09-25 DIAGNOSIS — R51.9 ACUTE NONINTRACTABLE HEADACHE, UNSPECIFIED HEADACHE TYPE: Primary | ICD-10-CM

## 2023-09-25 LAB
ALBUMIN SERPL-MCNC: 3.4 G/DL (ref 3.4–5)
ALBUMIN/GLOB SERPL: 0.9 {RATIO} (ref 1–2)
ALP LIVER SERPL-CCNC: 57 U/L
ALT SERPL-CCNC: 32 U/L
ANION GAP SERPL CALC-SCNC: 5 MMOL/L (ref 0–18)
AST SERPL-CCNC: 19 U/L (ref 15–37)
BASOPHILS # BLD AUTO: 0.02 X10(3) UL (ref 0–0.2)
BASOPHILS NFR BLD AUTO: 0.4 %
BILIRUB SERPL-MCNC: 0.4 MG/DL (ref 0.1–2)
BUN BLD-MCNC: 15 MG/DL (ref 7–18)
CALCIUM BLD-MCNC: 8.8 MG/DL (ref 8.5–10.1)
CHLORIDE SERPL-SCNC: 106 MMOL/L (ref 98–112)
CO2 SERPL-SCNC: 27 MMOL/L (ref 21–32)
CREAT BLD-MCNC: 1.43 MG/DL
EGFRCR SERPLBLD CKD-EPI 2021: 62 ML/MIN/1.73M2 (ref 60–?)
EOSINOPHIL # BLD AUTO: 0.09 X10(3) UL (ref 0–0.7)
EOSINOPHIL NFR BLD AUTO: 1.9 %
ERYTHROCYTE [DISTWIDTH] IN BLOOD BY AUTOMATED COUNT: 11.5 %
GLOBULIN PLAS-MCNC: 3.9 G/DL (ref 2.8–4.4)
GLUCOSE BLD-MCNC: 95 MG/DL (ref 70–99)
HCT VFR BLD AUTO: 43.5 %
HGB BLD-MCNC: 15.3 G/DL
IMM GRANULOCYTES # BLD AUTO: 0.02 X10(3) UL (ref 0–1)
IMM GRANULOCYTES NFR BLD: 0.4 %
LYMPHOCYTES # BLD AUTO: 0.84 X10(3) UL (ref 1–4)
LYMPHOCYTES NFR BLD AUTO: 18.2 %
MCH RBC QN AUTO: 28.5 PG (ref 26–34)
MCHC RBC AUTO-ENTMCNC: 35.2 G/DL (ref 31–37)
MCV RBC AUTO: 81.2 FL
MONOCYTES # BLD AUTO: 0.29 X10(3) UL (ref 0.1–1)
MONOCYTES NFR BLD AUTO: 6.3 %
NEUTROPHILS # BLD AUTO: 3.36 X10 (3) UL (ref 1.5–7.7)
NEUTROPHILS # BLD AUTO: 3.36 X10(3) UL (ref 1.5–7.7)
NEUTROPHILS NFR BLD AUTO: 72.8 %
OSMOLALITY SERPL CALC.SUM OF ELEC: 287 MOSM/KG (ref 275–295)
PLATELET # BLD AUTO: 186 10(3)UL (ref 150–450)
POTASSIUM SERPL-SCNC: 3.3 MMOL/L (ref 3.5–5.1)
PROT SERPL-MCNC: 7.3 G/DL (ref 6.4–8.2)
RBC # BLD AUTO: 5.36 X10(6)UL
SODIUM SERPL-SCNC: 138 MMOL/L (ref 136–145)
WBC # BLD AUTO: 4.6 X10(3) UL (ref 4–11)

## 2023-09-25 PROCEDURE — 80053 COMPREHEN METABOLIC PANEL: CPT | Performed by: EMERGENCY MEDICINE

## 2023-09-25 PROCEDURE — 99285 EMERGENCY DEPT VISIT HI MDM: CPT

## 2023-09-25 PROCEDURE — 36415 COLL VENOUS BLD VENIPUNCTURE: CPT

## 2023-09-25 PROCEDURE — 85025 COMPLETE CBC W/AUTO DIFF WBC: CPT

## 2023-09-25 PROCEDURE — 84484 ASSAY OF TROPONIN QUANT: CPT | Performed by: EMERGENCY MEDICINE

## 2023-09-25 PROCEDURE — 85025 COMPLETE CBC W/AUTO DIFF WBC: CPT | Performed by: EMERGENCY MEDICINE

## 2023-09-25 PROCEDURE — 99213 OFFICE O/P EST LOW 20 MIN: CPT

## 2023-09-25 PROCEDURE — 99214 OFFICE O/P EST MOD 30 MIN: CPT

## 2023-09-25 PROCEDURE — 70450 CT HEAD/BRAIN W/O DYE: CPT | Performed by: EMERGENCY MEDICINE

## 2023-09-25 PROCEDURE — 80053 COMPREHEN METABOLIC PANEL: CPT

## 2023-09-25 PROCEDURE — 93010 ELECTROCARDIOGRAM REPORT: CPT

## 2023-09-25 NOTE — ED INITIAL ASSESSMENT (HPI)
Pt here w/ HA to left side behind eye then radiated around and into neck. Nausea.  Feels like vision is a little more blurred than normal and numbness into hand

## 2023-09-25 NOTE — ED PROVIDER NOTES
Patient Seen in: [facility]      History   Patient presents with:  Headache    Stated Complaint: headache    Subjective:   HPI    38 yo male with headache that began on Saturday. Pain was initially behind the left eye and then radiated to the left side of the head and progressively worsened to a 15/10. Today the headache is present but improved but he now c/o blurred vision and left arm numbness/tingling. No recent illness or trauma. No difficulty speaking or walking. No prior h/o similar headaches. Objective:   Past Medical History:   Diagnosis Date    Asthma     History of 2019 novel coronavirus disease (COVID-19) 2021    HTN (hypertension)     KERRI (obstructive sleep apnea) ESC PSG-2022    AHI-114    Pulmonary emboli (HCC) 2022    Sickle cell trait ([facility] [address]     Visual impairment     contact lenses              Past Surgical History:   Procedure Laterality Date    HAND/FINGER SURGERY UNLISTED Right     REPAIR ROTATOR CUFF,ACUTE Right     SHOULDER SURG PROC UNLISTED                  Social History     Socioeconomic History    Marital status: Single   Tobacco Use    Smoking status: Former     Types: Cigarettes     Quit date: 2011     Years since quittin.7    Smokeless tobacco: Never   Vaping Use    Vaping Use: Never used   Substance and Sexual Activity    Alcohol use: Yes     Alcohol/week: 3.0 standard drinks of alcohol     Types: 3 Standard drinks or equivalent per week     Comment: socially, no hx of excessive use    Drug use: No    Sexual activity: Yes     Partners: Female   Other Topics Concern    Caffeine Concern Yes     Comment: occa coffee     Exercise No     Comment: on hold    Social History Narrative    Single, has an 6year-old daughter. He works as a . Review of Systems    Positive for stated complaint: headache  Other systems are as noted in HPI. Constitutional and vital signs reviewed.       All other systems reviewed and negative except as noted above. Physical Exam     ED Triage Vitals   BP 09/25/23 1814 (!) 145/92   Pulse 09/25/23 1808 91   Resp 09/25/23 1808 20   Temp --    Temp src --    SpO2 09/25/23 1808 99 %   O2 Device 09/25/23 1808 None (Room air)       Current:BP (!) 145/92   Pulse 91   Resp 20   SpO2 99%         Physical Exam  Vitals and nursing note reviewed. Constitutional:       Appearance: Normal appearance. He is well-developed. HENT:      Head: Normocephalic and atraumatic. Eyes:      Extraocular Movements: Extraocular movements intact. Pupils: Pupils are equal, round, and reactive to light. Cardiovascular:      Rate and Rhythm: Normal rate and regular rhythm. Pulmonary:      Effort: Pulmonary effort is normal. No respiratory distress. Skin:     General: Skin is warm and dry. Capillary Refill: Capillary refill takes less than 2 seconds. Neurological:      General: No focal deficit present. Mental Status: He is alert. Cranial Nerves: Cranial nerve deficit present. Sensory: No sensory deficit. Motor: No weakness. Gait: Gait normal.   Psychiatric:         Mood and Affect: Mood normal.         Behavior: Behavior normal.              ED Course   Labs Reviewed - No data to display                MDM         Medical Decision Making    Intracranial process vs tension vs migraine. BP elevated in IC. To ER for higher level of care. Disposition and Plan     Clinical Impression:  Acute nonintractable headache, unspecified headache type  (primary encounter diagnosis)     Disposition: Ic to ed  9/25/2023  6:24 pm    Follow-up:  No follow-up provider specified.         Medications Prescribed:  Discharge Medication List as of 9/25/2023  6:26 PM

## 2023-09-26 ENCOUNTER — HOSPITAL ENCOUNTER (EMERGENCY)
Facility: HOSPITAL | Age: 43
Discharge: HOME | End: 2023-09-26
Attending: EMERGENCY MEDICINE
Payer: COMMERCIAL

## 2023-09-26 ENCOUNTER — TELEPHONE (OUTPATIENT)
Dept: NEUROLOGY | Facility: CLINIC | Age: 43
End: 2023-09-26

## 2023-09-26 ENCOUNTER — APPOINTMENT (OUTPATIENT)
Dept: MRI IMAGING | Facility: HOSPITAL | Age: 43
End: 2023-09-26
Attending: EMERGENCY MEDICINE
Payer: COMMERCIAL

## 2023-09-26 VITALS
HEART RATE: 65 BPM | HEIGHT: 76 IN | RESPIRATION RATE: 18 BRPM | WEIGHT: 268 LBS | DIASTOLIC BLOOD PRESSURE: 91 MMHG | BODY MASS INDEX: 32.63 KG/M2 | SYSTOLIC BLOOD PRESSURE: 159 MMHG | OXYGEN SATURATION: 98 % | TEMPERATURE: 99 F

## 2023-09-26 DIAGNOSIS — R20.2 PARESTHESIAS: Primary | ICD-10-CM

## 2023-09-26 LAB
ATRIAL RATE: 71 BPM
P AXIS: 104 DEGREES
P-R INTERVAL: 192 MS
Q-T INTERVAL: 404 MS
QRS DURATION: 98 MS
QTC CALCULATION (BEZET): 439 MS
R AXIS: 190 DEGREES
T AXIS: 178 DEGREES
TROPONIN I HIGH SENSITIVITY: 11 NG/L
VENTRICULAR RATE: 71 BPM

## 2023-09-26 PROCEDURE — 70553 MRI BRAIN STEM W/O & W/DYE: CPT | Performed by: EMERGENCY MEDICINE

## 2023-09-26 PROCEDURE — 70549 MR ANGIOGRAPH NECK W/O&W/DYE: CPT | Performed by: EMERGENCY MEDICINE

## 2023-09-26 PROCEDURE — A9575 INJ GADOTERATE MEGLUMI 0.1ML: HCPCS | Performed by: EMERGENCY MEDICINE

## 2023-09-26 PROCEDURE — 70546 MR ANGIOGRAPH HEAD W/O&W/DYE: CPT | Performed by: EMERGENCY MEDICINE

## 2023-09-26 RX ORDER — GADOTERATE MEGLUMINE 376.9 MG/ML
20 INJECTION INTRAVENOUS
Status: COMPLETED | OUTPATIENT
Start: 2023-09-26 | End: 2023-09-26

## 2023-09-26 NOTE — ED QUICK NOTES
Rounding Completed    Plan of Care reviewed. Waiting for MRI. Elimination needs assessed. Bed is locked and in lowest position. Call light within reach.

## 2023-09-26 NOTE — ED QUICK NOTES
Rounding Completed. Pt back in room from MRI. Plan of Care reviewed. Waiting for MRI results. Elimination needs assessed. Bed is locked and in lowest position. Call light within reach.

## 2023-09-26 NOTE — ED QUICK NOTES
Rounding Completed    Plan of Care reviewed. Waiting for MRI. Elimination needs assessed. Provided pillow and warm blanket. Bed is locked and in lowest position. Call light within reach.

## 2023-09-26 NOTE — TELEPHONE ENCOUNTER
TIA CLINIC SCREENING    1. Date of ED visit/TIA diagnosis:  9/26/2023   Time of discharge from ED:  0648    2. Is patient currently admitted? No   If YES - TIA Clinic Appointment not required. 3. Does patient already see an [facility] neurologist?  Yes  Name:  Delaney Rivera   (if YES - TIA Clinic Appointment not required. Route message on to patient's neurologist)    4. Patient's current anti-platelet therapy:  NONE    5. Patient's current statin therapy:      6. Has 2D Echo with bubble test been done? No  Date:      7. Is TIA Clinic Appointment indicated? Unsure     If YES - route encounter to [address] [place] [address] to schedule patient for appointment NO LATER THAN 48 HOURS AFTER ED DISCHARGE. If UNSURE - route encounter to clinic provider for recommendation.      If NO - indicate reason and close encounter:  N/A

## 2023-09-26 NOTE — ED INITIAL ASSESSMENT (HPI)
PT REPORTS HEADACHE SINCE SATURDAY, LITTLE BETTER TODAY. VISION CHANGE AND SOME LEFT UPPER EXTREMITY NUMBNESS/TINGLING. DENIES INJURY/TRAUMA PRIOR TO HEADACHE. PT HAS NOT TAKEN ANY MEDS INCLUDING OTC FOR HEADACHE.

## 2023-09-27 ENCOUNTER — OFFICE VISIT (OUTPATIENT)
Facility: LOCATION | Age: 43
End: 2023-09-27

## 2023-09-27 ENCOUNTER — HOSPITAL ENCOUNTER (OUTPATIENT)
Dept: GENERAL RADIOLOGY | Age: 43
Discharge: HOME | End: 2023-09-27
Attending: PODIATRIST
Payer: COMMERCIAL

## 2023-09-27 ENCOUNTER — TELEPHONE (OUTPATIENT)
Dept: NEUROLOGY | Facility: CLINIC | Age: 43
End: 2023-09-27

## 2023-09-27 DIAGNOSIS — M79.672 BILATERAL FOOT PAIN: ICD-10-CM

## 2023-09-27 DIAGNOSIS — M79.671 BILATERAL FOOT PAIN: ICD-10-CM

## 2023-09-27 DIAGNOSIS — Z87.39 HISTORY OF GOUT: ICD-10-CM

## 2023-09-27 DIAGNOSIS — S93.402A MILD ANKLE SPRAIN, LEFT, INITIAL ENCOUNTER: Primary | ICD-10-CM

## 2023-09-27 DIAGNOSIS — M25.472 EDEMA OF LEFT ANKLE: ICD-10-CM

## 2023-09-27 DIAGNOSIS — M25.572 ACUTE LEFT ANKLE PAIN: ICD-10-CM

## 2023-09-27 DIAGNOSIS — R60.0 EDEMA OF LEFT FOOT: ICD-10-CM

## 2023-09-27 PROCEDURE — 73630 X-RAY EXAM OF FOOT: CPT | Performed by: PODIATRIST

## 2023-09-27 PROCEDURE — 99203 OFFICE O/P NEW LOW 30 MIN: CPT | Performed by: PODIATRIST

## 2023-09-27 NOTE — TELEPHONE ENCOUNTER
Made several attempts to reach Pt by phone. Spoke with Pt's brother who is also trying to contact him. I left the appt time in my VM.

## 2023-09-27 NOTE — TELEPHONE ENCOUNTER
EVITAM for CB - Appt today, 9/27/23 @11:40 with Dr. Clifford Odom is on hold for Pt TIA f/u. Please assist with scheduling when he calls back.

## 2023-09-27 NOTE — PROGRESS NOTES
[facility]  Progress Note    Sasha Sanchez is a 37year old male. Patient presents with:  New Patient: Bilateral foot pain, right is worse. He rolled his right ankle about 2 weeks ago, swelling showed up later, 2/10 for pain, denies any numbness or tinging. No recent xrays of either foot. HPI:     Patient is a pleasant 78-year-old male who is coming in clinic today for evaluation of bilateral lower extremities. Patient shares that he rolled his left ankle about 2 weeks ago and has been having lingering pain and swelling since. Does currently rates the pain 2/10, but does state that it gradually increases throughout the day. Patient is currently wearing sandals and states that he mostly wears them throughout the summer. Patient also shares that he has a history of gout in both of his feet. He was having some significant pain to the right foot, which has resolved. He also has a history of gout in his left great toe joint and does state he has residual swelling from a. Denying any other concerns at this time is here for further evaluation and care. Denies recent nausea, vomiting, fever, chills. Allergies: Patient has no known allergies. Current Outpatient Medications   Medication Sig Dispense Refill    triamcinolone 0.1 % External Cream Apply topically 2 (two) times daily as needed. 30 g 0    amLODIPine 10 MG Oral Tab Take 1 tablet (10 mg total) by mouth daily. 30 tablet 0    IRBESARTAN 300 MG Oral Tab TAKE 1 TABLET(300 MG) BY MOUTH EVERY NIGHT 90 tablet 1    albuterol 108 (90 Base) MCG/ACT Inhalation Aero Soln Inhale 2 puffs into the lungs every 6 (six) hours as needed for Wheezing.  18 g 0      Past Medical History:   Diagnosis Date    Asthma     History of 2019 novel coronavirus disease (COVID-19) 12/2021    HTN (hypertension)     KERRI (obstructive sleep apnea) ESC PSG-2/2/2022    AHI-114    Pulmonary emboli ([address] 12/27/2022    Sickle cell trait (HCC)     Visual impairment contact lenses      Past Surgical History:   Procedure Laterality Date    HAND/FINGER SURGERY UNLISTED Right     REPAIR ROTATOR CUFF,ACUTE Right     SHOULDER SURG PROC UNLISTED        Family History   Problem Relation Age of Onset    Cancer Father 76        colon    DVT/VTE Neg       Social History    Socioeconomic History      Marital status: Single    Tobacco Use      Smoking status: Former        Types: Cigarettes        Quit date: 2011        Years since quittin.7      Smokeless tobacco: Never    Vaping Use      Vaping Use: Never used    Substance and Sexual Activity      Alcohol use: Not Currently        Alcohol/week: 3.0 standard drinks of alcohol        Types: 3 Standard drinks or equivalent per week        Comment: socially, no hx of excessive use      Drug use: No      Sexual activity: Yes        Partners: Female    Other Topics      Concerns:        Caffeine Concern: Yes          occa coffee         Exercise: No          on hold           REVIEW OF SYSTEMS:     10 point ROS completed and was negative, except for pertinent positive and negatives stated in subjective. EXAM:     GENERAL: well developed, well nourished, in no apparent distress  EXTREMITIES:   1. Integument: No open wounds. No color changes. Normal skin temperature and turgor  2. Vascular: Dorsalis pedis 2/4 bilateral and posterior tibial pulses 2/4 bilateral, capillary refill normal.  Localized edema noted to left lateral ankle and left first MPJ  3. Neurological: Gross sensation intact via light touch bilaterally. Normal sharp/dull sensation   4. Musculoskeletal: All muscle groups are graded 5/5 in the foot and ankle. Pain with palpation overlying area of ATFL, left. Some laxity with anterior drawer test today, but no pain elicited. No pain directly over distal fibula, left. Minimal pain on palpation of fifth metatarsal base, left.   No pain along the course of the peroneal tendons or with palpation to anterior calcaneal process. No pain with palpation to left first MPJ. No pain with range of motion of left first MPJ. ASSESSMENT AND PLAN:   Diagnoses and all orders for this visit:    Mild ankle sprain, left, initial encounter    Acute left ankle pain    Edema of left ankle    History of gout    Edema of left foot    Other orders  -     XR FOOT, COMPLETE (MIN 3 VIEWS), BILAT (CPT=73630-50); Future        Plan:   -Patient was seen and evaluated today in clinic.    -Radiographs were obtained of bilateral feet today showing no acute osseous abnormalities. There are osseous changes to left first metatarsal (\"rat bite\") consistent with chronic gout.    -No current pain to left first MPJ today. Do recommend icing left first MPJ for inflammation/edema control. Most of patient's pain/concern is with his left lateral ankle following inversion injury a couple weeks ago. Discussed clinical exam findings with patient today which is consistent with lateral ankle sprain to the left ankle. Described in detail the anatomy of the lateral ankle ligaments and how an inversion type ankle sprain can place significant stress on these ligaments  Patient was given instruction to stay off the ankle as much as possible  Advised the use of compression stockings or ACE wraps to help decrease swelling/edema. Discussed the importance of immobilization. Discussed conservative management   Discussed surgical management and indications for both. Will move forward with conservative management. Recommend cryotherapy/icing, rest, and elevation. Recommend supportive shoe gear at all times. Provided patient with an at-home rehab program.  We will consider formal physical therapy if patient has not improved at next visit    Lace up ankle brace dispensed to patient today. Recommend use when weightbearing. Okay to take off at night.      -The patient indicates understanding of these issues and agrees to the plan.     Time spent reviewing pertinent information from patient's chart, reviewing any pertinent imaging, obtaining history and physical exam, and discussing and mutually agreeing on a treatment plan: 30 minutes    RTC 4 weeks    SHIRLEY Hagen [facility]    9/27/2023    Dragon speech recognition software was used to prepare this note. Errors in word recognition may occur. Please contact me with any questions/concerns with this note.

## 2023-10-16 PROBLEM — I77.819 AORTIC ECTASIA (HCC): Status: ACTIVE | Noted: 2023-10-16

## 2023-10-16 PROBLEM — I77.819 AORTIC ECTASIA: Status: ACTIVE | Noted: 2023-10-16

## 2023-10-17 DIAGNOSIS — I10 PRIMARY HYPERTENSION: ICD-10-CM

## 2023-10-18 ENCOUNTER — TELEPHONE (OUTPATIENT)
Dept: NEUROLOGY | Facility: CLINIC | Age: 43
End: 2023-10-18

## 2023-10-18 NOTE — TELEPHONE ENCOUNTER
LVM for pt to cb in regards to rescheduling appt that he canceled for today @ 3:40. Please advise

## 2023-10-19 DIAGNOSIS — I10 PRIMARY HYPERTENSION: ICD-10-CM

## 2023-10-19 RX ORDER — AMLODIPINE BESYLATE 10 MG/1
10 TABLET ORAL DAILY
Qty: 30 TABLET | Refills: 0 | Status: SHIPPED | OUTPATIENT
Start: 2023-10-19

## 2023-10-19 RX ORDER — AMLODIPINE BESYLATE 10 MG/1
10 TABLET ORAL DAILY
Qty: 90 TABLET | Refills: 0 | OUTPATIENT
Start: 2023-10-19

## 2023-10-19 NOTE — TELEPHONE ENCOUNTER
Hypertension Medications Protocol [id number]/17/2023 05:53 PM   Protocol Details CMP or BMP in past 12 months    Last serum creatinine< 2.0    Appointment in past 6 or next 3 months     Per Sept 2023 visit   Primary hypertension  stable same meds.

## 2023-11-14 ENCOUNTER — OFFICE VISIT (OUTPATIENT)
Dept: INTERNAL MEDICINE CLINIC | Facility: CLINIC | Age: 43
End: 2023-11-14
Payer: COMMERCIAL

## 2023-11-14 VITALS
TEMPERATURE: 97 F | HEIGHT: 76 IN | BODY MASS INDEX: 33.61 KG/M2 | HEART RATE: 84 BPM | OXYGEN SATURATION: 98 % | RESPIRATION RATE: 18 BRPM | DIASTOLIC BLOOD PRESSURE: 100 MMHG | WEIGHT: 276 LBS | SYSTOLIC BLOOD PRESSURE: 146 MMHG

## 2023-11-14 DIAGNOSIS — J45.20 MILD INTERMITTENT ASTHMA WITHOUT COMPLICATION: ICD-10-CM

## 2023-11-14 DIAGNOSIS — Z12.11 SCREEN FOR COLON CANCER: ICD-10-CM

## 2023-11-14 DIAGNOSIS — J98.59 MEDIASTINAL MASS: ICD-10-CM

## 2023-11-14 DIAGNOSIS — E87.6 HYPOKALEMIA: ICD-10-CM

## 2023-11-14 DIAGNOSIS — R20.2 PARESTHESIAS: ICD-10-CM

## 2023-11-14 DIAGNOSIS — D72.810 LYMPHOPENIA: ICD-10-CM

## 2023-11-14 DIAGNOSIS — N28.9 RENAL INSUFFICIENCY: ICD-10-CM

## 2023-11-14 DIAGNOSIS — Z86.711 HISTORY OF PULMONARY EMBOLISM: ICD-10-CM

## 2023-11-14 DIAGNOSIS — G47.33 OSA (OBSTRUCTIVE SLEEP APNEA): ICD-10-CM

## 2023-11-14 DIAGNOSIS — L24.81 IRRITANT CONTACT DERMATITIS DUE TO METALS: ICD-10-CM

## 2023-11-14 DIAGNOSIS — Z00.00 ROUTINE GENERAL MEDICAL EXAMINATION AT A HEALTH CARE FACILITY: Primary | ICD-10-CM

## 2023-11-14 DIAGNOSIS — I71.21 ANEURYSM OF ASCENDING AORTA WITHOUT RUPTURE (HCC): ICD-10-CM

## 2023-11-14 DIAGNOSIS — D57.3 SICKLE CELL TRAIT (HCC): ICD-10-CM

## 2023-11-14 DIAGNOSIS — I10 PRIMARY HYPERTENSION: ICD-10-CM

## 2023-11-14 PROBLEM — D70.9 CHRONIC NEUTROPENIA: Status: RESOLVED | Noted: 2021-06-23 | Resolved: 2023-11-14

## 2023-11-14 PROBLEM — D70.9 CHRONIC NEUTROPENIA (HCC): Status: RESOLVED | Noted: 2021-06-23 | Resolved: 2023-11-14

## 2023-11-14 PROBLEM — D72.819 CHRONIC LEUKOPENIA: Status: RESOLVED | Noted: 2021-06-07 | Resolved: 2023-11-14

## 2023-11-14 PROCEDURE — 3008F BODY MASS INDEX DOCD: CPT | Performed by: NURSE PRACTITIONER

## 2023-11-14 PROCEDURE — 3077F SYST BP >= 140 MM HG: CPT | Performed by: NURSE PRACTITIONER

## 2023-11-14 PROCEDURE — 3080F DIAST BP >= 90 MM HG: CPT | Performed by: NURSE PRACTITIONER

## 2023-11-14 PROCEDURE — 99396 PREV VISIT EST AGE 40-64: CPT | Performed by: NURSE PRACTITIONER

## 2023-11-14 RX ORDER — NEBIVOLOL 5 MG/1
5 TABLET ORAL DAILY
Qty: 90 TABLET | Refills: 1 | Status: SHIPPED | OUTPATIENT
Start: 2023-11-14 | End: 2023-11-14

## 2023-11-14 RX ORDER — NEBIVOLOL 5 MG/1
5 TABLET ORAL DAILY
Qty: 90 TABLET | Refills: 1 | Status: SHIPPED | OUTPATIENT
Start: 2023-11-14

## 2023-11-15 ENCOUNTER — OFFICE VISIT (OUTPATIENT)
Facility: LOCATION | Age: 43
End: 2023-11-15

## 2023-11-15 DIAGNOSIS — M10.9 GOUTY ARTHRITIS OF LEFT GREAT TOE: ICD-10-CM

## 2023-11-15 DIAGNOSIS — R60.0 EDEMA OF LEFT FOOT: ICD-10-CM

## 2023-11-15 DIAGNOSIS — M79.672 LEFT FOOT PAIN: ICD-10-CM

## 2023-11-15 DIAGNOSIS — M20.5X2 HALLUX LIMITUS, LEFT: Primary | ICD-10-CM

## 2023-11-15 RX ORDER — DEXAMETHASONE SODIUM PHOSPHATE 10 MG/ML
10 INJECTION INTRAMUSCULAR; INTRAVENOUS ONCE
Status: COMPLETED | OUTPATIENT
Start: 2023-11-15 | End: 2023-11-15

## 2023-11-15 NOTE — PROGRESS NOTES
[facility]  Progress Note    Karri Peña is a 37year old male. Chief Complaint   Patient presents with    Foot Pain     Left foot hallux joint pain. He is unable to wear shoes for too long, pain 5/10. Denies any numbness or tingling. Xrays done on 9/27/23. HPI:     Patient is a pleasant 20-year-old male who is returning to clinic today for recheck of left ankle and new complaints of left great toe joint pain and swelling. Patient states that he has no ankle concerns today and that his ankle has improved since his last appointment. Patient is more concerned with swelling of left great toe joint with intermittent pain, mostly when weightbearing. Rates the great toe joint pain 5/10. He states that he is unable to wear shoes for too long due to the discomfort and swelling. Denies any numbness or tingling. Denies any injury. Patient does have a history of gouty arthritis with chronic gouty arthritis changes on past radiographs. Denies any other concerns at this time is here for further evaluation and care. Denies recent nausea, vomiting, fever, chills. Allergies: Patient has no known allergies. Current Outpatient Medications   Medication Sig Dispense Refill    nebivolol 5 MG Oral Tab Take 1 tablet (5 mg total) by mouth daily. 90 tablet 1    AMLODIPINE 10 MG Oral Tab TAKE 1 TABLET(10 MG) BY MOUTH DAILY 30 tablet 0    desonide 0.05 % External Cream Apply 1 g topically 2 (two) times daily. Apply sparingly and rub gently into the affected area(s) on the face twice a day until smooth 60 g 2    mometasone 0.1 % External Ointment Apply 1 Application topically 2 (two) times daily. Apply to rough, raised areas on the abdomen twice daily until smooth 45 g 2    triamcinolone 0.1 % External Cream Apply topically 2 (two) times daily as needed.  30 g 0    IRBESARTAN 300 MG Oral Tab TAKE 1 TABLET(300 MG) BY MOUTH EVERY NIGHT 90 tablet 1    albuterol 108 (90 Base) MCG/ACT Inhalation Aero Soln Inhale 2 puffs into the lungs every 6 (six) hours as needed for Wheezing. 18 g 0      Past Medical History:   Diagnosis Date    Asthma     History of 2019 novel coronavirus disease (COVID-19) 2021    HTN (hypertension)     KERRI (obstructive sleep apnea) ESC PSG-2022    AHI-114    Pulmonary emboli ([facility] [address] 2022    Sickle cell trait ([facility] [address]     Visual impairment     contact lenses      Past Surgical History:   Procedure Laterality Date    HAND/FINGER SURGERY UNLISTED Right     REPAIR ROTATOR CUFF,ACUTE Right     SHOULDER SURG PROC UNLISTED        Family History   Problem Relation Age of Onset    Cancer Father 76        colon    DVT/VTE Neg       Social History     Socioeconomic History    Marital status: Single   Tobacco Use    Smoking status: Former     Types: Cigarettes     Quit date: 2011     Years since quittin.9    Smokeless tobacco: Never   Vaping Use    Vaping Use: Never used   Substance and Sexual Activity    Alcohol use: Not Currently     Alcohol/week: 3.0 standard drinks of alcohol     Types: 3 Standard drinks or equivalent per week     Comment: socially, no hx of excessive use    Drug use: No    Sexual activity: Yes     Partners: Female   Other Topics Concern    Caffeine Concern Yes     Comment: occa coffee     Exercise No     Comment: on hold            REVIEW OF SYSTEMS:     10 point ROS completed and was negative, except for pertinent positive and negatives stated in subjective. EXAM:   GENERAL: well developed, well nourished, in no apparent distress  EXTREMITIES:              1. Integument: No open wounds. No color changes. Normal skin temperature and turgor  2. Vascular: Dorsalis pedis 2/4 bilateral and posterior tibial pulses 2/4 bilateral, capillary refill normal.  Localized edema noted to left first MPJ. No edema to left ankle today. 3. Neurological: Gross sensation intact via light touch bilaterally.   Normal sharp/dull sensation              4. Musculoskeletal: All muscle groups are graded 5/5 in the foot and ankle. Pain with palpation and with range of motion of left first MPJ. No pain with palpation overlying area of ATFL, left. Some laxity with anterior drawer test today, but no pain elicited. No pain directly over distal fibula, left. No pain on palpation of fifth metatarsal base, left. No pain along the course of the peroneal tendons or with palpation to anterior calcaneal process. ASSESSMENT AND PLAN:   Diagnoses and all orders for this visit:    Hallux limitus, left  -     Dexamethasone (Decadron) 10 MG/ML injection 10 mg  -     DRAIN/INJECT SMALL JOINT/BURSA    Gouty arthritis of left great toe  -     Dexamethasone (Decadron) 10 MG/ML injection 10 mg  -     DRAIN/INJECT SMALL JOINT/BURSA    Edema of left foot    Left foot pain        Plan:   -Patient was seen and evaluated today in clinic. Chart history and previous radiographs reviewed. Known gouty arthritic changes to left first MPJ.    -Discussed clinical exam findings with patient today, which are consistent with chronic first MPJ edema due to chronic gout with current flareup.    -Discussed treatment options today. Would recommend a steroid injection into the left first MPJ due to patient's current pain and edema. Answered all of patient's questions and patient agrees to move forward. Consent reviewed and signed. PROCEDURE: 20600-steroid injection of left first MPJ  After reviewing options for cortisone injection and risks/potential complications, pt has agreed to proceed. Using aseptic technique, injection was then given using 1cc of 10mg/ml Dexamethasone, 1 cc of 1% lidocaine plain with 1 cc of 0.5% marcaine plain using a 25 gauge 1.5\" needle.   Site of infiltration -left first metatarsal phalangeal joint  Site covered with sterile bandaid  Pt tolerated procedure well and no complications encountered    -Patient to monitor for any worsening symptoms and will seek immediate medical attention if noticing any of the signs.    -I did discuss with patient that if she ultimately fails conservative management, he would need a first metatarsal phalangeal joint arthrodesis in the future. Briefly discussed the procedure. We will revisit this in the future if needed.    -The patient indicates understanding of these issues and agrees to the plan. Time spent reviewing pertinent information from patient's chart, reviewing any pertinent imaging, obtaining history and physical exam, and discussing and mutually agreeing on a treatment plan: 25 minutes    RTC 6-8 weeks      SHIRLEY Stovall [facility]        11/15/2023    Dragon speech recognition software was used to prepare this note. Errors in word recognition may occur. Please contact me with any questions/concerns with this note.

## 2023-11-15 NOTE — PROCEDURES
Verbal order per Dr Malhotra Credit to draw up, 1ml of Lidocaine 1%, 1ml of Marcaine and 1ml of Dexamethasone for left hallux injection.

## 2023-11-27 DIAGNOSIS — I10 PRIMARY HYPERTENSION: ICD-10-CM

## 2023-11-27 RX ORDER — AMLODIPINE BESYLATE 10 MG/1
10 TABLET ORAL DAILY
Qty: 30 TABLET | Refills: 0 | Status: SHIPPED | OUTPATIENT
Start: 2023-11-27

## 2023-11-28 RX ORDER — AMLODIPINE BESYLATE 10 MG/1
10 TABLET ORAL DAILY
Qty: 90 TABLET | Refills: 0 | OUTPATIENT
Start: 2023-11-28

## 2024-02-28 RX ORDER — ALBUTEROL SULFATE 90 UG/1
2 AEROSOL, METERED RESPIRATORY (INHALATION) EVERY 6 HOURS PRN
Qty: 18 G | Refills: 0 | Status: SHIPPED | OUTPATIENT
Start: 2024-02-28

## 2024-05-27 DIAGNOSIS — Z86.73 HISTORY OF TRANSIENT ISCHEMIC ATTACK (TIA): Primary | ICD-10-CM

## 2024-05-30 PROBLEM — I71.21 ASCENDING AORTIC ANEURYSM (HCC): Status: ACTIVE | Noted: 2024-05-30

## 2024-05-30 PROBLEM — Z86.73 HISTORY OF TRANSIENT ISCHEMIC ATTACK (TIA): Status: ACTIVE | Noted: 2024-05-30

## 2024-05-30 PROBLEM — I71.21 ASCENDING AORTIC ANEURYSM: Status: ACTIVE | Noted: 2024-05-30

## 2024-05-30 RX ORDER — NEBIVOLOL 5 MG/1
5 TABLET ORAL DAILY
Qty: 90 TABLET | Refills: 0 | Status: SHIPPED | OUTPATIENT
Start: 2024-05-30

## 2024-05-30 NOTE — TELEPHONE ENCOUNTER
Future Appointments   Date Time Provider Department Center   6/12/2024  4:00 PM Kirstie Waterman APRN [facility]

## 2024-05-30 NOTE — TELEPHONE ENCOUNTER
Please review. Protocol Failed; No Protocol  Please advise from last office visit:11/14/2023      ASSESSMENT AND PLAN:   Vinayak Patterson is a 43 year old male who presents for a complete physical exam.      HEALTH MAINTENANCE: labs reviewed.  Due for screening colonscopy                     Primary hypertension  irbesratan 300mg.  Amlodipine 10mg  he will confirm doses.  Add bystolic 5mg  send readings in 2 weeks via mychart.  If still elevated will consider increase to 10mg.  HR is stable   monitor asthma with beta blocker           Is refill appropriate?    Requested Prescriptions   Pending Prescriptions Disp Refills    NEBIVOLOL 5 MG Oral Tab [Pharmacy Med Name: Nebivolol Hydrochloride 5 Mg Tab [facility]] 90 tablet 0     Sig: TAKE 1 TABLET BY MOUTH ONE TIME DAILY       Hypertension Medications Protocol Failed - 5/27/2024  1:31 AM        Failed - Last BP reading less than 140/90     BP Readings from Last 1 Encounters:   11/14/23 (!) 146/100               Passed - CMP or BMP in past 12 months        Passed - In person appointment or virtual visit in the past 12 mos or appointment in next 3 mos     Recent Outpatient Visits              6 months ago Hallux limitus, left    [facility], [address], [place] Qiu DPM    Office Visit    6 months ago Routine general medical examination at a health care facility    [facility], [address] [place] [address], [place]    Office Visit    8 months ago Allergic contact dermatitis due to metals    [facility], Angel Taylor MD    Office Visit    8 months ago Mild ankle sprain, left, initial encounter    [facility], [address], [place] Qiu DPM    Office Visit    8 months ago Gout involving toe, unspecified cause, unspecified chronicity, unspecified laterality    [facility], [address] [place], [place] [address]Kirstie Membreno, WHITNEY    Office Visit                      Passed - EGFRCR  or GFRAA > 50     GFR Evaluation  EGFRCR: 62 , resulted on 9/25/2023                   Recent Outpatient Visits              6 months ago Hallux limitus, left    [facility], [address], [place] Qiu DPM    Office Visit    6 months ago Routine general medical examination at a health care facility    [facility], [address] [place], [place] [address]Kirstie Membreno APRN    Office Visit    8 months ago Allergic contact dermatitis due to metals    [facility] Angel Carolina MD    Office Visit    8 months ago Mild ankle sprain, left, initial encounter    [facility], [address], [place] Qiu DPM    Office Visit    8 months ago Gout involving toe, unspecified cause, unspecified chronicity, unspecified laterality    [facility], [address] [place], [place] [address]Kirstie Membreno, WHITNEY    Office Visit

## 2024-06-26 ENCOUNTER — OFFICE VISIT (OUTPATIENT)
Dept: INTERNAL MEDICINE CLINIC | Facility: CLINIC | Age: 44
End: 2024-06-26

## 2024-06-26 ENCOUNTER — LAB ENCOUNTER (OUTPATIENT)
Dept: LAB | Age: 44
End: 2024-06-26
Attending: NURSE PRACTITIONER

## 2024-06-26 VITALS
BODY MASS INDEX: 34.24 KG/M2 | HEIGHT: 76 IN | RESPIRATION RATE: 18 BRPM | TEMPERATURE: 98 F | DIASTOLIC BLOOD PRESSURE: 96 MMHG | HEART RATE: 80 BPM | OXYGEN SATURATION: 98 % | WEIGHT: 281.19 LBS | SYSTOLIC BLOOD PRESSURE: 142 MMHG

## 2024-06-26 DIAGNOSIS — M25.521 RIGHT ELBOW PAIN: ICD-10-CM

## 2024-06-26 DIAGNOSIS — J45.20 MILD INTERMITTENT ASTHMA WITHOUT COMPLICATION (HCC): ICD-10-CM

## 2024-06-26 DIAGNOSIS — Z11.3 SCREEN FOR STD (SEXUALLY TRANSMITTED DISEASE): ICD-10-CM

## 2024-06-26 DIAGNOSIS — Z87.01 HISTORY OF PNEUMONIA: ICD-10-CM

## 2024-06-26 DIAGNOSIS — G47.33 OSA (OBSTRUCTIVE SLEEP APNEA): ICD-10-CM

## 2024-06-26 DIAGNOSIS — N28.9 RENAL INSUFFICIENCY: ICD-10-CM

## 2024-06-26 DIAGNOSIS — R93.0 ABNORMAL MRI OF HEAD: ICD-10-CM

## 2024-06-26 DIAGNOSIS — I10 PRIMARY HYPERTENSION: ICD-10-CM

## 2024-06-26 DIAGNOSIS — I71.21 ANEURYSM OF ASCENDING AORTA WITHOUT RUPTURE (HCC): Primary | ICD-10-CM

## 2024-06-26 DIAGNOSIS — J98.59 MEDIASTINAL MASS: ICD-10-CM

## 2024-06-26 LAB
BILIRUB UR QL STRIP.AUTO: NEGATIVE
CLARITY UR REFRACT.AUTO: CLEAR
GLUCOSE UR STRIP.AUTO-MCNC: NORMAL MG/DL
HBV SURFACE AB SER QL: REACTIVE
HBV SURFACE AB SERPL IA-ACNC: 36.11 MIU/ML
HBV SURFACE AG SER-ACNC: <0.1 [IU]/L
HBV SURFACE AG SERPL QL IA: NONREACTIVE
HCV AB SERPL QL IA: NONREACTIVE
KETONES UR STRIP.AUTO-MCNC: NEGATIVE MG/DL
LEUKOCYTE ESTERASE UR QL STRIP.AUTO: NEGATIVE
NITRITE UR QL STRIP.AUTO: NEGATIVE
PH UR STRIP.AUTO: 6 [PH] (ref 5–8)
PROT UR STRIP.AUTO-MCNC: 70 MG/DL
SP GR UR STRIP.AUTO: 1.01 (ref 1–1.03)
T PALLIDUM AB SER QL IA: NONREACTIVE
UROBILINOGEN UR STRIP.AUTO-MCNC: NORMAL MG/DL

## 2024-06-26 PROCEDURE — 81001 URINALYSIS AUTO W/SCOPE: CPT | Performed by: NURSE PRACTITIONER

## 2024-06-26 PROCEDURE — 86803 HEPATITIS C AB TEST: CPT | Performed by: NURSE PRACTITIONER

## 2024-06-26 PROCEDURE — 86780 TREPONEMA PALLIDUM: CPT | Performed by: NURSE PRACTITIONER

## 2024-06-26 PROCEDURE — 87389 HIV-1 AG W/HIV-1&-2 AB AG IA: CPT | Performed by: NURSE PRACTITIONER

## 2024-06-26 PROCEDURE — 87491 CHLMYD TRACH DNA AMP PROBE: CPT | Performed by: NURSE PRACTITIONER

## 2024-06-26 PROCEDURE — 87340 HEPATITIS B SURFACE AG IA: CPT | Performed by: NURSE PRACTITIONER

## 2024-06-26 PROCEDURE — 87591 N.GONORRHOEAE DNA AMP PROB: CPT | Performed by: NURSE PRACTITIONER

## 2024-06-26 PROCEDURE — 99215 OFFICE O/P EST HI 40 MIN: CPT | Performed by: NURSE PRACTITIONER

## 2024-06-26 PROCEDURE — 80053 COMPREHEN METABOLIC PANEL: CPT | Performed by: NURSE PRACTITIONER

## 2024-06-26 PROCEDURE — 86706 HEP B SURFACE ANTIBODY: CPT | Performed by: NURSE PRACTITIONER

## 2024-06-26 NOTE — PROGRESS NOTES
Vinayak Patterson is a 43 year old male.    Chief Complaint   Patient presents with    Follow - Up     EJ RM 10- Pt sis here to f/u       HPI:   Here for a few issues  Follow up blood pressure was at  ER last week elevated bp   states it was 200/100 consistently prompting visit.  CT brain completed.  He deferred meclizine.   He had run out of meds and thinks someone told him not to take amlodipine so he stopped this.  Discussed importance of not stopping any of his medications.  He has now been taking cayanne pepper which he is thinking may be helping his bp.    Amlodipine 10mg, irbesartan 300mg, bystolic 5mg  discussed in an emergency he could increase bystolic.  He has seen Dr Bush in the past. Will check UA and refer back.      Upon review of his medical record for this ov I noted a hospitalization at [facility] in Feb.  He is vague about this hospitalization and does not seem to know the details of why he was there except he had pneumonia.  Required thorocentisis?  Has been back a few times for testing but not sure for what.  He reports a CT chest and what sounds like ECHO  known ascending aortic aneurysm which he reports as stable   he follows here with Dr Grajeda and needs to call for follow up   I discussed the importance of receiving the medical records from [facility] as it sounds like it was a complicated hospitalization   we will request records but he is aware it is also his responsibiltiy to help us with this.        Asthma   score 22  proair PRN.      Requesting STD testing.  Denies exposure.     Right elbow pain with some intermittent tingling of his ring and 5th finger.  No motor weakness.  Discomfort with certain movements.  Until records available [place] [facility], prefer not to use systemic NSAIDs.  Check CMP  may need referral to jaida gomes/Dr Chun.  Will reasses in f/u 2 weeks and he will call if worsening.  Try voltaren gel      Previous issues.     Abnormal MRI brain. see last ov note  was to see  neuro.  He has not seen neuro or scheduled ov.  I again strongly recommended he do this.  Has seen Dr MICHELLE Gaines in the past.       KERRI/mediastinal mass   Dr Hernandez. He did not follow up and now needs CPAP machine.   Given events at [facility] I explained the importance of pulmonary consultation.  He prefers to return to Dr Hernandez.      Renal insufficiency.  CMP with labs.  Refer back to Dr Bush.  Check UA      Patient Active Problem List   Diagnosis    KERRI (obstructive sleep apnea)    Mild intermittent asthma without complication (HCC)    Renal insufficiency    Sickle cell trait (HCC)    Lymphopenia    Primary hypertension    History of pulmonary embolism    S/P arthroscopy of left shoulder    Mediastinal mass    Family history of colon cancer in father    Aortic ectasia (HCC)    Ascending aortic aneurysm (HCC)    History of transient ischemic attack (TIA)     Current Outpatient Medications   Medication Sig Dispense Refill    nebivolol 5 MG Oral Tab Take 1 tablet (5 mg total) by mouth daily. 90 tablet 0    albuterol 108 (90 Base) MCG/ACT Inhalation Aero Soln Inhale 2 puffs into the lungs every 6 (six) hours as needed for Wheezing. 18 g 0    AMLODIPINE 10 MG Oral Tab TAKE 1 TABLET(10 MG) BY MOUTH DAILY 30 tablet 0    desonide 0.05 % External Cream Apply 1 g topically 2 (two) times daily. Apply sparingly and rub gently into the affected area(s) on the face twice a day until smooth 60 g 2    mometasone 0.1 % External Ointment Apply 1 Application topically 2 (two) times daily. Apply to rough, raised areas on the abdomen twice daily until smooth 45 g 2    triamcinolone 0.1 % External Cream Apply topically 2 (two) times daily as needed. 30 g 0    IRBESARTAN 300 MG Oral Tab TAKE 1 TABLET(300 MG) BY MOUTH EVERY NIGHT 90 tablet 1      Past Medical History:    Asthma (HCC)    History of 2019 novel coronavirus disease (COVID-19)    HTN (hypertension)    KERRI (obstructive sleep apnea)    AHI-114    Pulmonary emboli (HCC)    Sickle  cell trait (HCC)    Visual impairment    contact lenses      Social History:  Social History     Socioeconomic History    Marital status: Single   Tobacco Use    Smoking status: Former     Current packs/day: 0.00     Types: Cigarettes     Quit date: 2011     Years since quittin.5    Smokeless tobacco: Never   Vaping Use    Vaping status: Never Used   Substance and Sexual Activity    Alcohol use: Not Currently     Alcohol/week: 3.0 standard drinks of alcohol     Types: 3 Standard drinks or equivalent per week     Comment: socially, no hx of excessive use    Drug use: No    Sexual activity: Yes     Partners: Female   Other Topics Concern    Caffeine Concern Yes     Comment: occa coffee     Exercise No     Comment: on hold    Social History Narrative    Single, has an 11-year-old daughter.  He works as a .     Social Determinants of Health     Financial Resource Strain: Low Risk  (2021)    Received from [facility]    Overall Financial Resource Strain (CARDIA)     Difficulty of Paying Living Expenses: Not very hard   Transportation Needs: No Transportation Needs (2021)    [facility]    PRAPARE - Transportation     Lack of Transportation (Medical): No     Lack of Transportation (Non-Medical): No    [facility]    Social Connections    [facility]     Family History   Problem Relation Age of Onset    Cancer Father 68        colon    DVT/VTE Neg         Allergies  No Known Allergies    REVIEW OF SYSTEMS:   GENERAL HEALTH: feels well otherwise  RESPIRATORY: denies shortness of breath with exertion, no cough  CARDIOVASCULAR: denies chest pain on exertion, no palpatations  GI: denies abdominal pain and denies heartburn, no diarrhea or constipation  MUSCULOSKELETAL:  No arthralgias or myalgias  NEURO:  headaches,      EXAM:   BP (!) 142/96   Pulse 80   Temp 97.6 °F (36.4 °C) (Temporal)   Resp 18   Ht 6' 4\" (1.93 m)   Wt 281 lb 3.2 oz (127.6 kg)   SpO2 98%   BMI 34.23 kg/m²   GENERAL: well developed, well nourished,in no apparent distress  LUNGS: normal rate without respiratory distress, lungs clear to auscultation  CARDIO: RRR   GI: normal bowel sounds, no masses, HSM or tenderness  EXTREMITIES: no edema, normal strength and tone  reproducible right elbow discomfort medially.  No motor weakness.    PSYCH: alert and oriented x 3    ASSESSMENT AND PLAN:     Encounter Diagnoses   Name Primary?    Aneurysm of ascending aorta without rupture (HCC)  will need to follow up with Dr Grajeda.  Seems to be stable but will records from [facility] Yes    Primary hypertension  cont meds.  Discussed as above.  Will refer back to Dr Bush.  Check UA and CMP     Mild intermittent asthma without complication (HCC)  stable       Mediastinal mass  needs to f/u with Dr Hernandez.  Last CT hcest here 8/23  seems per his description he had this done at [facility].      History of pneumonia     KERRI (obstructive sleep apnea)  return to Dr Hernandez.  Needs new machine.      Screen for STD (sexually transmitted disease)     Abnormal MRI of head  I have again recommended he see Dr MICHELLE Gaines.  I tried to stress the importance of this with him as well as overall medical care.       Right elbow pain  voltaren gel     Renal insufficiency  check CMP and UA    Code selection for this visit was based on time spent (>50min) on date of service in preparing to see the patient, obtaining and/or reviewing separately obtained history, performing a medically appropriate examination, counseling and educating the patient/family/caregiver, ordering medications or testing, referring and communicating with other healthcare providers, documenting clinical information in the EHR, independently interpreting results and communicating results to the patient/family/caregiver and  care coordination with the patient's other providers.      Orders Placed This Encounter   Procedures    UA/M With Culture Reflex [E]    T Pallidum Screening Placer    HIV AG AB Combo (Consent Obtained prechecked)    Hepatitis B Surface Antibody    Hepatitis B Surface Antigen    HCV Antibody    Comp Metabolic Panel (14) [E]    Urine Chlamydia/GC Amplification       Meds & Refills for this Visit:  Requested Prescriptions      No prescriptions requested or ordered in this encounter       Imaging & Consults:  PULMONARY - INTERNAL  CARDIO - INTERNAL  NEURO - INTERNAL  NEPHROLOGY - INTERNAL    No follow-ups on file.  There are no Patient Instructions on file for this visit.

## 2024-06-27 LAB
C TRACH DNA SPEC QL NAA+PROBE: NEGATIVE
N GONORRHOEA DNA SPEC QL NAA+PROBE: NEGATIVE

## 2024-06-28 DIAGNOSIS — I10 PRIMARY HYPERTENSION: Primary | ICD-10-CM

## 2024-06-28 LAB
ALBUMIN SERPL-MCNC: 3.7 G/DL (ref 3.4–5)
ALBUMIN/GLOB SERPL: 1.1 {RATIO} (ref 1–2)
ALP LIVER SERPL-CCNC: 59 U/L
ALT SERPL-CCNC: 35 U/L
ANION GAP SERPL CALC-SCNC: 7 MMOL/L (ref 0–18)
AST SERPL-CCNC: 21 U/L (ref 15–37)
BILIRUB SERPL-MCNC: 0.8 MG/DL (ref 0.1–2)
BUN BLD-MCNC: 16 MG/DL (ref 9–23)
CALCIUM BLD-MCNC: 8.7 MG/DL (ref 8.5–10.1)
CHLORIDE SERPL-SCNC: 107 MMOL/L (ref 98–112)
CO2 SERPL-SCNC: 27 MMOL/L (ref 21–32)
CREAT BLD-MCNC: 1.46 MG/DL
EGFRCR SERPLBLD CKD-EPI 2021: 60 ML/MIN/1.73M2 (ref 60–?)
GLOBULIN PLAS-MCNC: 3.4 G/DL (ref 2.8–4.4)
GLUCOSE BLD-MCNC: 102 MG/DL (ref 70–99)
OSMOLALITY SERPL CALC.SUM OF ELEC: 293 MOSM/KG (ref 275–295)
POTASSIUM SERPL-SCNC: 3.9 MMOL/L (ref 3.5–5.1)
PROT SERPL-MCNC: 7.1 G/DL (ref 6.4–8.2)
SODIUM SERPL-SCNC: 141 MMOL/L (ref 136–145)

## 2024-07-23 RX ORDER — IRBESARTAN 300 MG/1
300 TABLET ORAL NIGHTLY
Qty: 90 TABLET | Refills: 0 | Status: SHIPPED | OUTPATIENT
Start: 2024-07-23

## 2024-07-23 NOTE — TELEPHONE ENCOUNTER
Please review; protocol failed/ has no protocol    Requested Prescriptions   Pending Prescriptions Disp Refills    Irbesartan 300 MG Oral Tab 90 tablet 1     Sig: Take 1 tablet (300 mg total) by mouth nightly.       Hypertension Medications Protocol Failed - 7/19/2024  2:41 PM        Failed - Last BP reading less than 140/90     BP Readings from Last 1 Encounters:   06/26/24 (!) 142/96               Passed - CMP or BMP in past 12 months        Passed - In person appointment or virtual visit in the past 12 mos or appointment in next 3 mos     Recent Outpatient Visits              3 weeks ago Aneurysm of ascending aorta without rupture (HCC)    [facility], [address] [place], [place] [address]Kirstie APRN    Office Visit    8 months ago Hallux limitus, left    [facility], [address], [place], [place]    Office Visit    8 months ago Routine general medical examination at a health care facility    [facility], [address] [place] [address] Kirstie Waterman, WHITNEY    Office Visit    9 months ago Allergic contact dermatitis due to metals    [facility] Angel Carolina MD    Office Visit    10 months ago Mild ankle sprain, left, initial encounter    [facility], [address], [place], DPM    Office Visit          Future Appointments         Provider Department Appt Notes    In 1 month Ruddy Gaines MD [facility], [facility] abnormal MRI                    Passed - EGFRCR or GFRAA > 50     GFR Evaluation  EGFRCR: 60 , resulted on 6/26/2024             Recent Outpatient Visits              3 weeks ago Aneurysm of ascending aorta without rupture (HCC)    [address] Kirstie Waterman APRN    Office Visit    8 months ago Hallux limitus, left    [facility], [address], [place], DPM    Office Visit    8 months ago Routine  general medical examination at a health care facility    [facility], [address] [place] [address] Kirstie Waterman APRN    Office Visit    9 months ago Allergic contact dermatitis due to metals    [facility], MD    Office Visit    10 months ago Mild ankle sprain, left, initial encounter    [facility], [address], [place], PORFIRIO    Office Visit          Future Appointments         Provider Department Appt Notes    In 1 month Ruddy Gaines MD [facility], [facility] abnormal MRI

## 2024-07-26 ENCOUNTER — ORDER TRANSCRIPTION (OUTPATIENT)
Dept: ADMINISTRATIVE | Facility: HOSPITAL | Age: 44
End: 2024-07-26

## 2024-07-26 DIAGNOSIS — I71.21 ANEURYSM OF ASCENDING AORTA WITHOUT RUPTURE (HCC): Primary | ICD-10-CM

## 2024-08-09 ENCOUNTER — HOSPITAL ENCOUNTER (OUTPATIENT)
Dept: CT IMAGING | Age: 44
Discharge: HOME | End: 2024-08-09
Attending: INTERNAL MEDICINE
Payer: COMMERCIAL

## 2024-08-09 DIAGNOSIS — I71.21 ANEURYSM OF ASCENDING AORTA WITHOUT RUPTURE (HCC): ICD-10-CM

## 2024-08-09 PROCEDURE — 71275 CT ANGIOGRAPHY CHEST: CPT | Performed by: INTERNAL MEDICINE

## 2024-09-26 RX ORDER — IRBESARTAN 300 MG/1
300 TABLET ORAL NIGHTLY
Qty: 90 TABLET | Refills: 0 | Status: SHIPPED | OUTPATIENT
Start: 2024-09-26

## 2024-09-26 RX ORDER — NEBIVOLOL 5 MG/1
5 TABLET ORAL DAILY
Qty: 90 TABLET | Refills: 0 | Status: SHIPPED | OUTPATIENT
Start: 2024-09-26

## 2024-09-26 NOTE — TELEPHONE ENCOUNTER
Per office visit with WHITNEY Fraser 6/26/2024:  Amlodipine 10mg, irbesartan 300mg, bystolic 5mg discussed in an emergency he could increase bystolic. He has seen Dr Bush in the past. Will check UA and refer back.

## 2024-09-26 NOTE — TELEPHONE ENCOUNTER
Please review; protocol failed    Vicki -   Should further refills be coming from Cardiology or Nephrology?    **Patient last had an appointment with Cardiology, Dr. Stephane Grajeda 7/26/24    **Patient has follow up with Nephrology, Dr. Rudy Bush 10/8/2024    Last office visit: 6/26/2024    Per last office visit with Cardiology, Dr. Grajeda:  Blood pressure medications have been changed. He is now on nebivolol 5 mg daily as well as irbesartan 300 mg daily. He is not taking the atenolol or the amlodipine.     Requested Prescriptions   Pending Prescriptions Disp Refills    nebivolol 5 MG Oral Tab 90 tablet 0     Sig: Take 1 tablet (5 mg total) by mouth daily.       Hypertension Medications Protocol Failed - 9/20/2024 12:02 PM        Failed - Last BP reading less than 140/90     BP Readings from Last 1 Encounters:   06/26/24 (!) 142/96               Passed - CMP or BMP in past 12 months        Passed - In person appointment or virtual visit in the past 12 mos or appointment in next 3 mos     Recent Outpatient Visits              3 months ago Aneurysm of ascending aorta without rupture (HCC)    [facility], [address] [place] [address] Kirstie Waterman APRN    Office Visit    10 months ago Hallux limitus, left    [facility], [address], [place] Qiu DPM    Office Visit    10 months ago Routine general medical examination at a health care facility    [facility], [address] [place] [address] Kirstie Waterman APRN    Office Visit    12 months ago Allergic contact dermatitis due to metals    [facility] Angel Carolina MD    Office Visit    1 year ago Mild ankle sprain, left, initial encounter    [facility], [address], [place] Qiu DPM    Office Visit          Future Appointments         Provider Department Appt Notes    In 1 week Rudy Bush MD [facility] Nephrology Ref'd back by Kirstie OLSON for HTN,  last seen 6/2021  LVM to confirm-MALIK    In 1 month Ruddy Gaines MD [facility] lvm to conf - abnormal MRI                    Passed - EGFRCR or GFRAA > 50     GFR Evaluation  EGFRCR: 60 , resulted on 6/26/2024            Irbesartan 300 MG Oral Tab 90 tablet 0     Sig: Take 1 tablet (300 mg total) by mouth nightly.       Hypertension Medications Protocol Failed - 9/20/2024 12:02 PM        Failed - Last BP reading less than 140/90     BP Readings from Last 1 Encounters:   06/26/24 (!) 142/96               Passed - CMP or BMP in past 12 months        Passed - In person appointment or virtual visit in the past 12 mos or appointment in next 3 mos     Recent Outpatient Visits              3 months ago Aneurysm of ascending aorta without rupture (HCC)    [facility], [address] [place], [place] [address]Kirstie Membreno APRN    Office Visit    10 months ago Hallux limitus, left    [facility], [address], [place] Qiu DPM    Office Visit    10 months ago Routine general medical examination at a health care facility    [facility], [address] [place] [address], [place]    Office Visit    12 months ago Allergic contact dermatitis due to metals    [facility] Angel Taylor MD    Office Visit    1 year ago Mild ankle sprain, left, initial encounter    [facility], [address], [place] Qiu DPM    Office Visit          Future Appointments         Provider Department Appt Notes    In 1 week Rudy Bush MD [facility] Nephrology Ref'd back by Kirstie OLSON for HTN, last seen 6/2021  LVM to confirm-MALIK    In 1 month Ruddy Gaines MD [facility], [facility] lvm to conf - abnormal MRI                    Passed - EGFRCR or GFRAA > 50     GFR Evaluation  EGFRCR: 60 , resulted on 6/26/2024               Future Appointments         Provider  Department Appt Notes    In 1 week Rudy Bush MD [facility] Nephrology Ref'd back by Kirstie OLSON for HTN, last seen 6/2021  LVM to confirm-MALIK    In 1 month Ruddy Gaines MD [facility], [facility] to conf - abnormal MRI          Recent Outpatient Visits              3 months ago Aneurysm of ascending aorta without rupture (HCC)    [facility], [address] [place] [address] Kirstie Waterman APRN    Office Visit    10 months ago Hallux limitus, left    [facility], [address], [place] Qiu DPM    Office Visit    10 months ago Routine general medical examination at a health care facility    [facility], [address] [place] [address] Kirstie Waterman APRN    Office Visit    12 months ago Allergic contact dermatitis due to metals    [facility], Angel Taylor MD    Office Visit    1 year ago Mild ankle sprain, left, initial encounter    [facility], [address], [place] Qiu DPM    Office Visit

## 2025-01-22 RX ORDER — NEBIVOLOL 5 MG/1
5 TABLET ORAL DAILY
Qty: 90 TABLET | Refills: 1 | Status: SHIPPED | OUTPATIENT
Start: 2025-01-22

## 2025-01-22 NOTE — TELEPHONE ENCOUNTER
Please review. Protocol Failed; No Protocol    BP Readings from Last 3 Encounters:   06/26/24 (!) 142/96   11/14/23 (!) 146/100   09/26/23 (!) 159/91         Requested Prescriptions   Pending Prescriptions Disp Refills    NEBIVOLOL 5 MG Oral Tab [Pharmacy Med Name: NEBIVOLOL 5 MG TABLET] 90 tablet 0     Sig: Take 1 tablet (5 mg total) by mouth daily.       Hypertension Medications Protocol Failed - 1/22/2025  1:43 PM        Failed - Last BP reading less than 140/90     BP Readings from Last 1 Encounters:   06/26/24 (!) 142/96               Passed - CMP or BMP in past 12 months        Passed - In person appointment or virtual visit in the past 12 mos or appointment in next 3 mos     Recent Outpatient Visits              7 months ago Aneurysm of ascending aorta without rupture (HCC)    [facility], [address] [place] [address] Kirstie Waterman, WHITNEY    Office Visit    1 year ago Hallux limitus, left    [facility], [address], [place] Qiu DPM    Office Visit    1 year ago Routine general medical examination at a health care facility    [facility], [address] [place] [address], [place], WHITNEY    Office Visit    1 year ago Allergic contact dermatitis due to metals    [facility] Angel Taylor MD    Office Visit    1 year ago Mild ankle sprain, left, initial encounter    [facility], [address], [place] Qiu DPM    Office Visit                      Passed - EGFRCR or GFRAA > 50     GFR Evaluation  EGFRCR: 60 , resulted on 6/26/2024          Passed - Medication is active on med list                 Recent Outpatient Visits              7 months ago Aneurysm of ascending aorta without rupture (HCC)    [facility], [address] [place], [place] [address]Kirstie Membreno, WHITNEY    Office Visit    1 year ago Hallux limitus, left    [facility], [address], [place] Qiu DPM    Office Visit    1 year  ago Routine general medical examination at a health care facility    [facility], [address], [place], WHITNEY    Office Visit    1 year ago Allergic contact dermatitis due to metals    [facility] Angel Taylor MD    Office Visit    1 year ago Mild ankle sprain, left, initial encounter    [facility], [address], [place] Santos Holloway, PORFIRIO    Office Visit

## 2025-05-13 RX ORDER — IRBESARTAN 300 MG/1
300 TABLET ORAL NIGHTLY
Qty: 90 TABLET | Refills: 0 | Status: SHIPPED | OUTPATIENT
Start: 2025-05-13

## (undated) DIAGNOSIS — Z13.220 SCREENING FOR LIPID DISORDERS: ICD-10-CM

## (undated) DIAGNOSIS — Z13.29 SCREENING FOR THYROID DISORDER: ICD-10-CM

## (undated) DIAGNOSIS — Z00.00 ROUTINE GENERAL MEDICAL EXAMINATION AT A HEALTH CARE FACILITY: Primary | ICD-10-CM

## (undated) DIAGNOSIS — Z13.0 SCREENING FOR DISORDER OF BLOOD AND BLOOD-FORMING ORGANS: ICD-10-CM

## (undated) DIAGNOSIS — Z13.228 SCREENING FOR METABOLIC DISORDER: ICD-10-CM

## (undated) DEVICE — STERILE POLYISOPRENE POWDER-FREE SURGICAL GLOVES: Brand: PROTEXIS

## (undated) DEVICE — SHEET,DRAPE,70X100,STERILE: Brand: MEDLINE

## (undated) DEVICE — STOCK SURG LG 48X9IN

## (undated) DEVICE — GAUZE TRAY STERILE 4X4 12PLY

## (undated) DEVICE — CANNULA TWIST-IN 7CMX8.25MM

## (undated) DEVICE — TUBING IRR 16FT CNT WV 3 ASCP

## (undated) DEVICE — SLEEVE KENDALL SCD EXPRESS MED

## (undated) DEVICE — SUT FIBERWIRE 2 AR-7209

## (undated) DEVICE — 3.3 MM INTEGRATED CABLE WAND ICW, COVATOR 20, 20 DEGREE: Brand: COBLATION

## (undated) DEVICE — #15 STERILE STAINLESS BLADE: Brand: STERILE STAINLESS BLADES

## (undated) DEVICE — NEEDLE SCORPION AR-13995N

## (undated) DEVICE — ALCOHOL 70% 4 OZ

## (undated) DEVICE — SUT FIBERLINK ARTHREX AR-7535

## (undated) DEVICE — LIGHT HANDLE

## (undated) DEVICE — ABSORBANT FLOOR PAD

## (undated) DEVICE — 1000 S-DRAPE TOWEL DRAPE 10/BX: Brand: STERI-DRAPE™

## (undated) DEVICE — COVER LIGHT HANDLE RIGID GREEN

## (undated) DEVICE — 3M™ TEGADERM™ +PAD FILM DRESSING WITH NON-ADHERENT PAD, 3584, 2-3/8 IN X 4 IN (6 CM X 10 CM), 50/CAR, 4 CAR/CS: Brand: 3M™ TEGADERM™

## (undated) DEVICE — COVER,MAYO STAND,STERILE: Brand: MEDLINE

## (undated) DEVICE — SOLUTION  .9 3000ML

## (undated) DEVICE — [AGGRESSIVE PLUS CUTTER, ARTHROSCOPIC SHAVER BLADE,  DO NOT RESTERILIZE,  DO NOT USE IF PACKAGE IS DAMAGED,  KEEP DRY,  KEEP AWAY FROM SUNLIGHT]: Brand: FORMULA

## (undated) DEVICE — PAD SACRAL PREMIUM 12X12X1

## (undated) DEVICE — SUT TIGERWIRE 2 AR-7209T

## (undated) DEVICE — SUT MONOCRYL 3-0 PS-2 Y427H

## (undated) DEVICE — 5.0 MM I.D. UNIVERSAL CANNULA, 76                                    MM LONG, BLUE, LATEX SEAL,                                    SINGLE-USE STERILE PACKS, BOX OF 10.                                    INCLUDES OBTURATOR AND TROCAR

## (undated) DEVICE — SHOULDER ARTHROSCOPY CDS-LF: Brand: MEDLINE INDUSTRIES, INC.

## (undated) DEVICE — SUTURE PASSER AR-4068-90

## (undated) DEVICE — DRAPE,U/SHT,SPLIT,FILM,60X84,STERILE: Brand: MEDLINE

## (undated) DEVICE — MEDI-VAC NON-CONDUCTIVE SUCTION TUBING: Brand: CARDINAL HEALTH

## (undated) DEVICE — TUBING DW OUTFLOW

## (undated) DEVICE — DEVICE FLUID REMOVAL-WATER BUG

## (undated) DEVICE — SUT MONOCRYL 2-0 SH Y417H

## (undated) DEVICE — 3M™ STERI-STRIP™ REINFORCED ADHESIVE SKIN CLOSURES, R1547, 1/2 IN X 4 IN (12 MM X 100 MM), 6 STRIPS/ENVELOPE: Brand: 3M™ STERI-STRIP™

## (undated) DEVICE — STERILE POLYISOPRENE POWDER-FREE SURGICAL GLOVES WITH EMOLLIENT COATING: Brand: PROTEXIS

## (undated) DEVICE — 3M™ IOBAN™ 2 ANTIMICROBIAL INCISE DRAPE 6648EZ: Brand: IOBAN™ 2

## (undated) DEVICE — GOWN,SIRUS,FABRIC-REINFORCED,X-LARGE: Brand: MEDLINE

## (undated) DEVICE — DUAL SPIKE ADAPTER: Brand: CONMED

## (undated) DEVICE — CLOSURE EXOFIN 1.0ML

## (undated) DEVICE — KIT TRC TRIMANO BEACH CHR ARM

## (undated) NOTE — LETTER
12/16/19        Ewing Babinski  [address] [facility]      Dear Terrie Aguilar records indicate that you have outstanding lab work and or testing that was ordered for you and has not yet been completed:  Orders Placed This Encounter      CBC W

## (undated) NOTE — LETTER
ASTHMA ACTION PLAN for Andrea Krueger     : 1980     Date: 2022  Provider:  Kary Ventura MD  Phone for doctor or clinic: [address] [facility] [address] [place]  [phone]    ACT Score: 22      You can use the colors of a traffic light to help learn about your asthma medicines. 1. Green - Go! % of Personal Best Peak Flow Use controller medicine. Breathing is good  No cough or wheeze  Can work and play Medicine How much to take When to take it    ALBUTEROL 108 (90 Base) MCG/ACT Inhalation Aero Soln    INHALE 2 PUFFS INTO THE LUNGS EVERY 6 HOURS AS NEEDED FOR WHEEZING           2. Yellow - Caution. 50-79% Personal Best Peak  Flow. Use reliever medicine to keep an asthma attack from getting bad. Cough  Wheezing  Tight Chest  Wake up at night Medicine How much to take When to take it           Additional instructions         3. Red - Stop! Danger!  <50% Personal Best Peak  Flow. Take these medications until  Get help from a doctor   Medicine not helping  Breathing is hard and fast  Nose opens wide  Can't walk  Ribs show  Can't talk well Medicine How much to take When to take it    Go to the nearest Emergency Room/Department right now! Additional Instructions If your symptoms do not improve and you cannot contact your doctor, [facility] or call 911 immediately! [x] Asthma Action Plan reviewed with patient (and caregiver if necessary) and a copy of the plan was given to the patient/caregiver. [] Asthma Action Plan reviewed with patient (and caregiver if necessary) on the phone and mailed copy to patient or submitted via [employer].      Signatures:  Provider  Kary Ventura MD   Patient Caretaker

## (undated) NOTE — LETTER
Patient Name: Abhinav Ahuja  YOB: 1980          MRN :  [id number]  Date:  6/11/2023  Referring Physician:  [address] [place] [address]    Discharge Summary  Pt has attended 33 visits in Physical Therapy. Subjective: The patient reports that he worked out yesterday, did OH triceps extensions and it felt tight around the lats. He notes that as he did more as he did more it loosened up. He notes that he feels pretty good, comfortable discharging today. He reports that at the gym he did bench incline, shrugs, did some core stuff. Pt feels like the shoulder is doing well. Pain: 0/10 updated 6/9    Objective:   6/9/2023  Fl 170, abd 170, ER 80 R 80. ER at 90: R: 105  L: 100  IR at 90: R: 60  L: 60    MMT  ER 5, IR 5, abd 5, flexion 5        Assessment: The patient has full shoulder range of motion and strength at this time. He does feel tight initially when performing strengthening exercises, therefore encouraged him to perform some mobility exercises as warm up prior to his strengthening. The patient has met all physical therapy goals and will be discharged from this plan of care for physical therapy at this time. Goals: (To be met in 33 visits)   Pt will report improved ability to sleep without waking due to shoulder pain  MET  Pt will improve shoulder flexion AROM to >170 degrees to be able to reach into overhead cabinets without pain or restriction  MET  Pt will improve shoulder abduction AROM to >170 degrees to improve ability to don deodorant, don/doff shirts, and wash hair MET  Pt will increase shoulder AROM ER to 80 to reach and fasten seatbelt  MET   Pt will increase shoulder AROM IR to 60 to be able to reach in back pocket, tuck in shirt, and turn steering wheel without pain  MET  Pt will improve shoulder strength throughout to 5/5 to improve function with ADLs including lifting, pushing and pulling boxes overhead.   MET  Pt will demonstrate increased mid/low trap strength to 5/5 to promote improved shoulder mechanics and stabilization with ADL such as lifting and reaching   MET   Pt will be independent and compliant with comprehensive HEP to maintain progress achieved in PT  MET      Plan: The patient will be discharged from this episode of care for physical therapy       Patient/Family/Caregiver was advised of these findings, precautions, and treatment options and has agreed to actively participate in planning and for this course of care. Thank you for your referral. If you have any questions, please contact me at Dept: [phone]. Sincerely,  Electronically signed by therapist: Usman Deleon, PT     Certification From: 8/22/9730  To:9/9/2023 21st [employer] Cures Act Notice to Patient: Medical documents like this are made available to patients in the interest of transparency. However, be advised this is a medical document and it is intended as vjaf-bg-vzux communication between your medical providers. This medical document may contain abbreviations, assessments, medical data, and results or other terms that are unfamiliar. Medical documents are intended to carry relevant information, facts as evident, and the clinical opinion of the practitioner. As such, this medical document may be written in language that appears blunt or direct. You are encouraged to contact your medical provider and/or [facility] Patient Experience if you have any questions about this medical document.

## (undated) NOTE — MR AVS SNAPSHOT
After Visit Summary   2022    Hayley Mckeon   MRN: [id number]           Visit Information     Date & Time  2022  7:30 AM Provider  [facility] EEG Dept. Phone  [phone]      Allergies as of 2022  Review status set to Review Complete on 2022   No Known Allergies     Your Current Medications        Dosage    gabapentin 300 MG Oral Cap Take 1 capsule (300 mg total) by mouth nightly. indomethacin 50 MG Oral Cap () Take 1 capsule (50 mg total) by mouth 3 (three) times daily with meals for 7 days. Irbesartan 300 MG Oral Tab Take 1 tablet (300 mg total) by mouth nightly. amLODIPine 5 MG Oral Tab Take 1 tablet (5 mg total) by mouth daily. ALBUTEROL 108 (90 Base) MCG/ACT Inhalation Aero Soln INHALE 2 PUFFS INTO THE LUNGS EVERY 6 HOURS AS NEEDED FOR WHEEZING    spironolactone 25 MG Oral Tab Take 1 tablet (25 mg total) by mouth daily. Diagnoses for This Visit    Syncope, unspecified syncope type   [[id number]]             We Ordered the Following     Normal Orders This Visit    EEG [NEU4 CUSTOM]       Future Appointments        Provider Department    2022 9:30 AM Maninder, [address] [place] [address]    5/10/2023 3:00 PM Marie Rasmussen, [address] [facility]                Did you know that [facility] primary care physicians now offer Video Visits through [address] for adult patients for a variety of conditions such as allergies, back pain and cold symptoms? Skip the drive and waiting room and online chat with a doctor face-to-face using your web-cam enabled computer or mobile device wherever you are. Video Visits cost $50 and can be paid hassle-free using a credit, debit, or health savings card. Not active on [employer]? Ask us how to get signed up today! If you receive a survey from [employer], please take a few minutes to complete it and provide feedback.  We strive to deliver the best patient experience and are looking for ways to make improvements. Your feedback will help us do so. For more information on [employer], please visit [email or web address]           No text in SmartText           No text in SmartText

## (undated) NOTE — LETTER
Date: 9/21/2022    Patient Name: Yuridia Tavares          To Whom it may concern: This letter has been written at the patient's request. The above patient was seen at one of the [address] for treatment of a medical condition. The patient cannot lift greater than 20 lbs until completion of physical therapy.       Sincerely,    Beni Dominguez MD

## (undated) NOTE — LETTER
Date: 1/4/2022    Patient Name: Jeet Stokes          To Whom it may concern: This letter has been written at the patient's request.    This patient may return to on 1/5/2022.  He has completed his isolation period and a repeat COVID test is not ind

## (undated) NOTE — LETTER
Your patient was recently seen at the [facility] for a hospital follow-up visit. The visit note is attached. Please contact the clinic with any questions at [phone].     Thank you,  WHITNEY Drew

## (undated) NOTE — LETTER
OUTSIDE TESTING RESULT REQUEST     IMPORTANT: FOR YOUR IMMEDIATE ATTENTION  Please FAX all test results listed below to: [phone]     Testing already done on or about: 2022    * * * * If testing is NOT complete, arrange with patient A.S.A.P. * * * *      Patient Name: Jose Manuel Cerna  Surgery Date: 12/15/2022  CSN: [id number]  Medical Record: [id number]   : 1980 - A: 38 y      Sex: male  Surgeon(s): Dada Hilton MD  Procedure: LEFT SHOULDER ARTHROSCOPY WITH LABRAL REPAIR, AND EXTENSIVE DEBRIDEMENT. Anesthesia Type: Choice     Surgeon: Dada Hilton MD     The following Testing and Time Line are REQUIRED PER ANESTHESIA     Stress testing- NEED TRACING      Thank You,   Sent by: Hemanth Hung RN

## (undated) NOTE — LETTER
ASTHMA ACTION PLAN for Ginette Pfeiffer     : 1980     Date: 10/16/2019  Provider:  WHITNEY Urias  Phone for doctor or clinic: [facility], [facility] [place] [address], [address] [facility]  [phone]

## (undated) NOTE — LETTER
ASTHMA ACTION PLAN for Kaleb Garcia     : 1980     Date: 2018  Provider:  WHITNEY Cid  Phone for doctor or clinic: [facility], [address] [address] [facility], [address] [place] [address] [phone]

## (undated) NOTE — LETTER
AUTHORIZATION FOR SURGICAL OPERATION OR OTHER PROCEDURE    1. I hereby authorize Dr. Onelia Simpson, and [facility] staff assigned to my case to perform the following operation and/or procedure at the [facility]:    _______________________________________________________________________________________________    Cortisone injection left hallux  _______________________________________________________________________________________________    2. My physician has explained the nature and purpose of the operation or other procedure, possible alternative methods of treatment, the risks involved, and the possibility of complication to me. I acknowledge that no guarantee has been made as to the result that may be obtained. 3.  I recognize that, during the course of this operation, or other procedure, unforseen conditions may necessitate additional or different procedure than those listed above. I, therefore, further authorize and request that the above named physician, his/her physician assistants or designees perform such procedures as are, in his/her professional opinion, necessary and desirable. 4.  Any tissue or organs removed in the operation or other procedure may be disposed of by and at the discretion of the [facility] and [facility]. 5.  I understand that in the event of a medical emergency, I will be transported by local paramedics to [facility] or other hospital emergency department. 6.  I certify that I have read and fully understand the above consent to operation and/or other procedure. 7.  I acknowledge that my physician has explained sedation/analgesia administration to me including the risks and benefits. I consent to the administration of sedation/analgesia as may be necessary or desirable in the judgement of my physician.     Witness signature: ___________________________________________________ Date:  ______/______/_____                    Time: ________ A. M.  P.M. Patient Name:  ______________________________________________________  (please print)      Patient signature:  ___________________________________________________             Relationship to Patient:           []  Parent    Responsible person                          []  Spouse  In case of minor or                    [] Other  _____________   Incompetent name:  __________________________________________________                               (please print)      _____________      Responsible person  In case of minor or  Incompetent signature:  _______________________________________________    Statement of Physician  My signature below affirms that prior to the time of the procedure, I have explained to the patient and/or his/her guardian, the risks and benefits involved in the proposed treatment and any reasonable alternative to the proposed treatment. I have also explained the risks and benefits involved in the refusal of the proposed treatment and have answered the patient's questions.                         Date:  ______/______/_______  Provider                      Signature:  __________________________________________________________       Time:  ___________ A.M    P.M.

## (undated) NOTE — LETTER
Date: 3/23/2020    Patient Name: Leanne Nguyen        Dear Employer,  At [facility], we are taking special precautions and doing everything we can to prevent the spread of COVID-19 (coronavirus disease 2019).  During this time, we ask for your

## (undated) NOTE — LETTER
Date: 7/7/2022    Patient Name: Adrianne Olivares          To Whom it may concern: This letter has been written at the patient's request. The above patient was seen at the [facility] for treatment of a medical condition. The patient may return to work on July 7, 2022.          Sincerely,    Janice Levine MD

## (undated) NOTE — LETTER
WORK STATUS WORKSHEET    Date & Time: 12/23/2022, 9:42 AM  Patient: Winnie     The patient should remain off work until January 2, 2022. He can return to work in limited capacity, no lifting greater than 2lbs beginning on January 3, 2022. Follow up in four weeks. JORGE L Escobedo, PAMattC Orthopedic Surgery / Sports Medicine Specialist  [facility]  [address], [address]   [place]. [facility]  [email or web address]  t: [phone]  o: [phone]  f: [phone]          This note was dictated using Dragon software. While it was briefly proofread prior to completion, some grammatical, spelling, and word choice errors due to dictation may still occur.